# Patient Record
Sex: FEMALE | Race: WHITE | NOT HISPANIC OR LATINO | Employment: OTHER | URBAN - METROPOLITAN AREA
[De-identification: names, ages, dates, MRNs, and addresses within clinical notes are randomized per-mention and may not be internally consistent; named-entity substitution may affect disease eponyms.]

---

## 2018-07-11 ENCOUNTER — OFFICE VISIT (OUTPATIENT)
Dept: URGENT CARE | Facility: CLINIC | Age: 58
End: 2018-07-11
Payer: COMMERCIAL

## 2018-07-11 ENCOUNTER — APPOINTMENT (OUTPATIENT)
Dept: RADIOLOGY | Facility: CLINIC | Age: 58
End: 2018-07-11
Payer: COMMERCIAL

## 2018-07-11 VITALS
HEART RATE: 98 BPM | SYSTOLIC BLOOD PRESSURE: 118 MMHG | TEMPERATURE: 99.6 F | OXYGEN SATURATION: 94 % | RESPIRATION RATE: 18 BRPM | DIASTOLIC BLOOD PRESSURE: 60 MMHG

## 2018-07-11 DIAGNOSIS — M25.561 ACUTE PAIN OF RIGHT KNEE: Primary | ICD-10-CM

## 2018-07-11 DIAGNOSIS — M25.561 ACUTE PAIN OF RIGHT KNEE: ICD-10-CM

## 2018-07-11 PROCEDURE — G0382 LEV 3 HOSP TYPE B ED VISIT: HCPCS | Performed by: PHYSICIAN ASSISTANT

## 2018-07-11 PROCEDURE — 73562 X-RAY EXAM OF KNEE 3: CPT

## 2018-07-11 RX ORDER — DICLOFENAC SODIUM 75 MG/1
75 TABLET, DELAYED RELEASE ORAL 2 TIMES DAILY
Qty: 14 TABLET | Refills: 0 | Status: SHIPPED | OUTPATIENT
Start: 2018-07-11 | End: 2018-09-10

## 2018-07-11 RX ORDER — TRAZODONE HYDROCHLORIDE 50 MG/1
200 TABLET ORAL
COMMUNITY
End: 2019-06-11 | Stop reason: SDUPTHER

## 2018-07-11 RX ORDER — CLONAZEPAM 0.5 MG/1
0.5 TABLET ORAL 2 TIMES DAILY
COMMUNITY
End: 2019-05-07 | Stop reason: SDUPTHER

## 2018-07-11 RX ORDER — LITHIUM CARBONATE 150 MG/1
600 CAPSULE ORAL
COMMUNITY
End: 2019-05-07 | Stop reason: SDUPTHER

## 2018-07-11 RX ORDER — VENLAFAXINE 50 MG/1
150 TABLET ORAL EVERY MORNING
COMMUNITY
End: 2019-05-07 | Stop reason: ALTCHOICE

## 2018-07-11 NOTE — PATIENT INSTRUCTIONS

## 2018-07-11 NOTE — PROGRESS NOTES
[facility]        NAME: Kaushik Shea is a 60 y o  female  : 1960    MRN: [id number]  DATE: 2018  TIME: 3:33 PM    Assessment and Plan   Acute pain of right knee [M25 561]  1  Acute pain of right knee  XR knee 3 vw right non injury    diclofenac (VOLTAREN) 75 mg EC tablet         Patient Instructions       Follow up with orthopedics if no improvement in 3-5 days  Proceed to  ER if symptoms worsen  Chief Complaint     Chief Complaint   Patient presents with    Knee Pain     Pt c/o right knee pain for four weeks  History of Present Illness       Patient has right knee pain for the past 4 weeks  He was getting better until last night when her dog pulled her and re-injured the right knee  There is no swelling or limitation of motion  Most of the pain is in the medial aspect of her knee  Patient denies erythema or bruising  Review of Systems   Review of Systems   Constitutional: Negative for chills and fever  HENT: Negative for rhinorrhea and sore throat  Eyes: Negative for redness  Respiratory: Negative for cough  Cardiovascular: Negative for chest pain  Gastrointestinal: Negative for abdominal pain  Musculoskeletal: Negative for myalgias  Neurological: Negative for dizziness and headaches  Hematological: Negative for adenopathy           Current Medications       Current Outpatient Prescriptions:     clonazePAM (KlonoPIN) 0 5 mg tablet, Take 0 5 mg by mouth 2 (two) times a day, Disp: , Rfl:     lithium carbonate 150 mg capsule, Take 150 mg by mouth 3 (three) times a day with meals, Disp: , Rfl:     traZODone (DESYREL) 50 mg tablet, Take 50 mg by mouth daily at bedtime, Disp: , Rfl:     venlafaxine (EFFEXOR) 50 mg tablet, Take 50 mg by mouth 2 (two) times a day, Disp: , Rfl:     diclofenac (VOLTAREN) 75 mg EC tablet, Take 1 tablet (75 mg total) by mouth 2 (two) times a day for 7 days, Disp: 14 tablet, Rfl: 0    Current Allergies     Allergies as of 07/11/2018 - Reviewed 07/11/2018   Allergen Reaction Noted    Penicillins  07/11/2018            The following portions of the patient's history were reviewed and updated as appropriate: allergies, current medications, past family history, past medical history, past social history, past surgical history and problem list      History reviewed  No pertinent past medical history  History reviewed  No pertinent surgical history  No family history on file  Medications have been verified  Objective   /60   Pulse 98   Temp 99 6 °F (37 6 °C)   Resp 18   SpO2 94%        Physical Exam     Physical Exam   Constitutional: She is oriented to person, place, and time  She appears well-developed and well-nourished  HENT:   Head: Normocephalic and atraumatic  Eyes: Conjunctivae are normal    Neck: Neck supple  Cardiovascular: Normal rate and regular rhythm  Pulmonary/Chest: Effort normal    Neurological: She is alert and oriented to person, place, and time  Skin: Skin is warm and dry  No rash noted  Psychiatric: She has a normal mood and affect  Her behavior is normal  Judgment and thought content normal    Nursing note and vitals reviewed  Right knee x-ray viewed by me and independently reviewed by me contemporaneously as no fracture, degenerative changes

## 2018-07-18 ENCOUNTER — TRANSCRIBE ORDERS (OUTPATIENT)
Dept: ADMINISTRATIVE | Facility: HOSPITAL | Age: 58
End: 2018-07-18

## 2018-07-18 DIAGNOSIS — M25.561 ACUTE PAIN OF RIGHT KNEE: Primary | ICD-10-CM

## 2018-07-24 ENCOUNTER — HOSPITAL ENCOUNTER (OUTPATIENT)
Dept: MRI IMAGING | Facility: HOSPITAL | Age: 58
Discharge: HOME | End: 2018-07-24
Attending: FAMILY MEDICINE
Payer: COMMERCIAL

## 2018-07-24 DIAGNOSIS — M25.561 ACUTE PAIN OF RIGHT KNEE: ICD-10-CM

## 2018-07-24 PROCEDURE — 73721 MRI JNT OF LWR EXTRE W/O DYE: CPT

## 2018-07-27 ENCOUNTER — OFFICE VISIT (OUTPATIENT)
Dept: OBGYN CLINIC | Facility: CLINIC | Age: 58
End: 2018-07-27
Payer: COMMERCIAL

## 2018-07-27 VITALS
HEART RATE: 67 BPM | SYSTOLIC BLOOD PRESSURE: 134 MMHG | DIASTOLIC BLOOD PRESSURE: 85 MMHG | WEIGHT: 218 LBS | HEIGHT: 64 IN | BODY MASS INDEX: 37.22 KG/M2

## 2018-07-27 DIAGNOSIS — S83.241A ACUTE MEDIAL MENISCUS TEAR OF RIGHT KNEE, INITIAL ENCOUNTER: Primary | ICD-10-CM

## 2018-07-27 PROCEDURE — 99203 OFFICE O/P NEW LOW 30 MIN: CPT | Performed by: ORTHOPAEDIC SURGERY

## 2018-07-27 PROCEDURE — 20610 DRAIN/INJ JOINT/BURSA W/O US: CPT | Performed by: ORTHOPAEDIC SURGERY

## 2018-07-27 RX ORDER — BUPIVACAINE HYDROCHLORIDE 2.5 MG/ML
4 INJECTION, SOLUTION INFILTRATION; PERINEURAL
Status: COMPLETED | OUTPATIENT
Start: 2018-07-27 | End: 2018-07-27

## 2018-07-27 RX ORDER — BETAMETHASONE SODIUM PHOSPHATE AND BETAMETHASONE ACETATE 3; 3 MG/ML; MG/ML
12 INJECTION, SUSPENSION INTRA-ARTICULAR; INTRALESIONAL; INTRAMUSCULAR; SOFT TISSUE
Status: COMPLETED | OUTPATIENT
Start: 2018-07-27 | End: 2018-07-27

## 2018-07-27 RX ADMIN — BUPIVACAINE HYDROCHLORIDE 4 ML: 2.5 INJECTION, SOLUTION INFILTRATION; PERINEURAL at 15:22

## 2018-07-27 RX ADMIN — BETAMETHASONE SODIUM PHOSPHATE AND BETAMETHASONE ACETATE 12 MG: 3; 3 INJECTION, SUSPENSION INTRA-ARTICULAR; INTRALESIONAL; INTRAMUSCULAR; SOFT TISSUE at 15:22

## 2018-07-27 NOTE — PROGRESS NOTES
Chief Complaint  Right knee pain    History Of Presenting Illness  WINSOME Noyola 1960 presents with right knee pain 2 months duration      Current Medications  Current Outpatient Prescriptions   Medication Sig Dispense Refill    clonazePAM (KlonoPIN) 0 5 mg tablet Take 0 5 mg by mouth 2 (two) times a day      diclofenac (VOLTAREN) 75 mg EC tablet Take 1 tablet (75 mg total) by mouth 2 (two) times a day for 7 days 14 tablet 0    lithium carbonate 150 mg capsule Take 150 mg by mouth 3 (three) times a day with meals      traZODone (DESYREL) 50 mg tablet Take 50 mg by mouth daily at bedtime      venlafaxine (EFFEXOR) 50 mg tablet Take 50 mg by mouth 2 (two) times a day       No current facility-administered medications for this visit  Current Problems    Active Problems: There are no active problems to display for this patient          Review of Systems:    General: negative for - chills, fatigue, fever,  weight gain or weight loss  Psychological: negative for - anxiety, behavioral disorder, concentration difficulties  Ophthalmic: negative for - blurry vision, decreased vision, double vision,  ENT: negative for - hearing difficulties   Allergy and Immunology: negative for - hives, insect bite sensitivity,  Hematological and Lymphatic: negative for - bleeding problems, blood clots,bruising, swollen lymph nodes  Endocrine: negative for - hair pattern changes, hot flashes, malaise/lethargy  Respiratory: negative for - cough, hemoptysis, orthopnea, shortness of breath  Cardiovascular: negative for - chest pain, dyspnea on exertion, edema  Gastrointestinal: negative for - abdominal pain,  blood in stools, change in bowel habits,  nausea/vomiting  Genito-Urinary: negative for - dysuria, incontinence, irregular/heavy menses or urinary frequency/urgency  Musculoskeletal: see HPI  Dermatological: negative fo  rash and skin lesion changes  Neurological: negative for confusion, impaired coordination/balance, memory loss, numbness/tingling, seizures    Past Medical History:   No past medical history on file  Past Surgical History:   No past surgical history on file  Family History:  Family history reviewed and non-contributory  No family history on file  Social History:  Social History     Social History    Marital status: /Civil Union     Spouse name: N/A    Number of children: N/A    Years of education: N/A     Social History Main Topics    Smoking status: Not on file    Smokeless tobacco: Not on file    Alcohol use Not on file    Drug use: Unknown    Sexual activity: Not on file     Other Topics Concern    Not on file     Social History Narrative    No narrative on file       Allergies: Allergies   Allergen Reactions    Penicillins            Physical ExaminationThere were no vitals taken for this visit  Gen: Alert and oriented to person, place, time  HEENT: EOMI, eyes clear, moist mucus membranes, hearing intact  Respiratory: Bilateral chest rise  No audible wheezing found  Cardiovascular: Regular Rate and Rhythm  Abdomen: soft nontender/nondistended    Orthopedic Exam  ***          Impression  ***        Procedure: Mri Knee Right  Wo Contrast    Result Date: 7/26/2018  Narrative: INDICATION:  Medial pain for two months  ORDERING PROVIDER:  Rex Magdaleno  TECHNIQUE:  Multi planar fat and water sensitive sequences of the right knee were obtained without intravenous contrast  COMPARISON:  None available  FINDINGS: Medial compartment: Medial meniscus: Radial tear in the body of the meniscus  Medial articular surface: No focal medial compartment articular abnormalities identified  Lateral compartment: Lateral  meniscus: Signal and morphology of the lateral meniscus is within normal limits without evidence of meniscal tear  Lateral articular surface: No focal lateral compartment articular abnormalities identified  Patellofemoral joint space:  The articular surfaces reveal no significant focal area of chondrosis or chondromalacia  Ligaments and tendons: The ACL anteromedial and posterolateral bundles are unremarkable, without evidence of ACL injury  The PCL is within normal limits without evidence of tear or significant degeneration  The collateral ligamentous structures, including the MCL and fibular collateral ligaments are within normal limits without tear  Laterally the iliotibial band and popliteus tendons are intact  Postero-medially, semimembranosus, sartorius, gracilis and semitendinosus tendons are within normal limits  Quadriceps tendon is within normal limits  Gastrocnemius muscle insertions appear normal  Patellar tendon reveals no tear or significant tendinopathy  Soft tissues and bone marrow: There is no knee effusion  Marrow signal is within normal limits  No stress reaction or focal osseous lesions are identified  Impression: Radial tear in the free edge of the body of the medial meniscus  Cruciate and collateral ligaments are intact Signed by AYESHA Nino  Plan    ***    Angélica Ramachandran MD        Portions of the record may have been created with voice recognition software   Occasional wrong word or "sound a like" substitutions may have occurred due to the inherent limitations of voice recognition software   Read the chart carefully and recognize, using context, where substitutions have occurred

## 2018-07-27 NOTE — PATIENT INSTRUCTIONS
Diagnostic Knee Arthroscopy   WHAT YOU NEED TO KNOW:   Diagnostic knee arthroscopy is a procedure to look inside your knee joint  An arthroscope is a flexible tube with a light and camera on the end  Diagnostic arthroscopy is usually done to check for disease or damage inside your knee  DISCHARGE INSTRUCTIONS:   Medicines:   · Pain medicine: You may be given medicine to take away or decrease pain  Do not wait until the pain is severe before you take your medicine  · Take your medicine as directed  Contact your healthcare provider if you think your medicine is not helping or if you have side effects  Tell him or her if you are allergic to any medicine  Keep a list of the medicines, vitamins, and herbs you take  Include the amounts, and when and why you take them  Bring the list or the pill bottles to follow-up visits  Carry your medicine list with you in case of an emergency  Follow up with your healthcare provider or orthopedist as directed: You will need to return to have your wound checked and stitches removed  Write down your questions so you remember to ask them during your visits  Wound care:  Keep your bandage clean and dry  When you are allowed to bathe, carefully wash the wound with soap and water  Dry the area and put on new, clean bandages as directed  Change your bandages when they get wet or dirty  Self-care:   · Elevate:  Raise your knee above the level of your heart as often as you can  This will help decrease swelling and pain  Prop your knee on pillows or blankets to keep it elevated comfortably  · Ice:  Ice helps decrease swelling and pain  Ice may also help prevent tissue damage  Use an ice pack or put crushed ice in a plastic bag  Cover it with a towel, and place it on your knee for 15 to 20 minutes every hour as directed  · Wear pressure stockings: These are long, tight stockings that put pressure on your legs to promote blood flow and prevent clots  · Wear your brace:   You may need to wear a brace on your knee  This will help prevent movement so your knee can heal  You may need to use crutches to help you move around  · Exercise:  Ask your healthcare provider about the best exercise plan for you  Start slowly and return to your usual activities as directed  Physical therapy:  A physical therapist teaches you exercises to help improve movement and strength, and to decrease pain  Contact your healthcare provider or orthopedist if:   · You have a fever  · You have more pain in your knee, even after you take pain medicines  · Your wound is red, swollen, or draining pus  · You have questions or concerns about your condition or care  Seek care immediately or call 911 if:   · Blood soaks through your bandage  · Your stitches come apart  · You fall or injure your knee  · Your toes are numb, tingly, cool, or blue  · Your arm or leg feels warm, tender, and painful  It may look swollen and red  · You feel lightheaded and short of breath  · You have chest pain when you take a deep breath or cough  · You cough up blood  © 2017 [facility] Information is for End User's use only and may not be sold, redistributed or otherwise used for commercial purposes  All illustrations and images included in CareNotes® are the copyrighted property of [employer] D [employer]  or [employer]  The above information is an  only  It is not intended as medical advice for individual conditions or treatments  Talk to your doctor, nurse or pharmacist before following any medical regimen to see if it is safe and effective for you  Knee Exercises   WHAT YOU NEED TO KNOW:   What do I need to know about knee exercises? Knee exercises help strengthen the muscles around your knee  Strong muscles can help reduce pain and decrease your risk of future injury  Knee exercises also help you heal after an injury or surgery  · Start slow    These are beginning exercises  Ask your healthcare provider if you need to see a physical therapist for more advanced exercises  As you get stronger, you may be able to do more sets of each exercise or add weights  · Stop if you feel pain  It is normal to feel some discomfort at first  Regular exercise will help decrease your discomfort over time  · Do the exercises on both legs  Do this so both knees remain strong  · Warm up before you do knee exercises  Walk or ride a stationary bike for 5 or 10 minutes to warm your muscles  How do I perform knee stretches safely? Always stretch before you do strengthening exercises  Do these stretching exercises again after you do the strengthening exercises  Do these stretches 4 or 5 days a week, or as directed  · Standing calf stretch: Face a wall and place both palms flat on the wall, or hold the back of a chair for balance  Keep a slight bend in your knees  Take a big step backward with one leg  Keep your other leg directly under you  Keep both heels flat and press your hips forward  Hold the stretch for 30 seconds, and then relax for 30 seconds  Switch legs  Repeat 2 or 3 times on each leg  · Standing quadriceps stretch:  Stand and place one hand against a wall or hold the back of a chair for balance  With your weight on one leg, bend your other leg and grab your ankle  Bring your heel toward your buttocks  Hold the stretch for 30 to 60 seconds  Switch legs  Repeat 2 or 3 times on each leg  · Sitting hamstring stretch:  Sit with both legs straight in front of you  Do not point or flex your toes  Place your palms on the floor and slide your hands forward until you feel the stretch  Do not round your back  Hold the stretch for 30 seconds  Repeat 2 or 3 times  How do I perform knee strengthening exercises safely? Do these exercises 4 or 5 days a week, or as directed  · Standing half squats:  Stand with your feet shoulder-width apart   Lean your back against a wall or hold the back of a chair for balance, if needed  Slowly sit down about 10 inches, as if you are going to sit in a chair  Your body weight should be mostly over your heels  Hold the squat for 5 seconds, then rise to a standing position  Do 3 sets of 10 squats to strengthen your buttocks and thighs  · Standing hamstring curls: Face a wall and place both palms flat on the wall, or hold the back of a chair for balance  With your weight on one leg, lift your other foot as close to your buttocks as you can  Hold for 5 seconds and then lower your leg  Do 2 sets of 10 curls on each leg  This exercise strengthens the muscles in the back of your thigh  · Standing calf raises:  Face a wall and place both palms flat on the wall, or hold the back of a chair for balance  Stand up straight, and do not lean  Place all your weight on one leg by lifting the other foot off the floor  Raise the heel of the foot that is on the floor as high as you can and then lower it  Do 2 sets of 10 calf raises on each leg to strengthen your calf muscles  · Straight leg lifts:  Lie on your stomach with straight legs  Fold your arms in front of you and rest your head in your arms  Tighten your leg muscles and raise one leg as high as you can  Hold for 5 seconds, then lower your leg  Do 2 sets of 10 lifts on each leg to strengthen your buttocks  · Sitting leg lifts:  Sit in a chair  Slowly straighten and raise one leg  Squeeze your thigh muscles and hold for 5 seconds  Relax and return your foot to the floor  Do 2 sets of 10 lifts on each leg  This helps strengthen the muscles in the front of your thigh  When should I contact my healthcare provider? · You have new pain or your pain becomes worse  · You have questions or concerns about your condition or care  CARE AGREEMENT:   You have the right to help plan your care  Learn about your health condition and how it may be treated  Discuss treatment options with your caregivers to decide what care you want to receive  You always have the right to refuse treatment  The above information is an  only  It is not intended as medical advice for individual conditions or treatments  Talk to your doctor, nurse or pharmacist before following any medical regimen to see if it is safe and effective for you  © 2017 [address] [facility] Information is for End User's use only and may not be sold, redistributed or otherwise used for commercial purposes  All illustrations and images included in CareNotes® are the copyrighted property of A D [employer] M [employer] Inc  or [employer]

## 2018-07-27 NOTE — PROGRESS NOTES
Chief complaint  Right knee pain 3 months    History Of Presenting Illness  WINSOME Noyola 1960 presents with right knee pain 3 months  Patient got up from a sitting position and x-ray and MRI  Patient is well otherwise except for history of bipolar disorder acute pain in the right knee  Pain mainly located in the medial side  Initially associated sense of instability  Now patient has constant discomfort in the medial aspect of the right knee  Aggravated by bending stooping and walking  Decreased with rest   At its worse patient rates the pain as a 5/10  Patient has had no treatment for this  Patient presents for evaluation with an MRI and x-ray  Patient is well otherwise  Patient has history of bipolar disorder      Current Medications  Current Outpatient Prescriptions   Medication Sig Dispense Refill    clonazePAM (KlonoPIN) 0 5 mg tablet Take 0 5 mg by mouth 2 (two) times a day      lithium carbonate 150 mg capsule Take 150 mg by mouth 3 (three) times a day with meals      traZODone (DESYREL) 50 mg tablet Take 50 mg by mouth daily at bedtime      venlafaxine (EFFEXOR) 50 mg tablet Take 50 mg by mouth 2 (two) times a day      diclofenac (VOLTAREN) 75 mg EC tablet Take 1 tablet (75 mg total) by mouth 2 (two) times a day for 7 days 14 tablet 0     No current facility-administered medications for this visit  Current Problems    Active Problems: There are no active problems to display for this patient  Review of Systems:    General: negative for - chills, fatigue, fever,  weight gain or weight loss  Psychological: negative for - anxiety, behavioral disorder, concentration difficulties  Ophthalmic: negative for - blurry vision, decreased vision, double vision,      Past Medical History:   No past medical history on file  Past Surgical History:   No past surgical history on file  Family History:  Family history reviewed and non-contributory  No family history on file      Social History:  Social History     Social History    Marital status: /Civil Union     Spouse name: N/A    Number of children: N/A    Years of education: N/A     Social History Main Topics    Smoking status: Never Smoker    Smokeless tobacco: Never Used    Alcohol use Not on file    Drug use: Unknown    Sexual activity: Not on file     Other Topics Concern    Not on file     Social History Narrative    No narrative on file       Allergies:    Allergies   Allergen Reactions    Penicillins            Physical ExaminationBP 134/85   Pulse 67   Ht 5' 4" (1 626 m)   Wt 98 9 kg (218 lb)   BMI 37 42 kg/m²   Gen: Alert and oriented to person, place, time  HEENT: EOMI, eyes clear, moist mucus membranes, hearing intact      Orthopedic Exam  Right knee minimal effusion present  No obvious swelling or deformity  Flexion 3-120 degrees  Medial joint line tenderness especially in the posterior medial aspect  MRI shows radial tear medial meniscus          Impression  Right knee pain due to medial meniscal tear          Plan    Discussed treatment with the patient  Right knee injected with steroid and local anesthetic  Patient will start a physical therapy program and home exercise program  Follow-up in 6 weeks  If the knee still symptomatic she will benefit from an arthroscopic debridement  Large joint arthrocentesis  Date/Time: 7/27/2018 3:22 PM  Consent given by: patient  Site marked: site marked  Timeout: Immediately prior to procedure a time out was called to verify the correct patient, procedure, equipment, support staff and site/side marked as required   Supporting Documentation  Indications: pain and joint swelling   Procedure Details  Location: knee - R knee  Preparation: Patient was prepped and draped in the usual sterile fashion  Needle size: 22 G  Ultrasound guidance: no  Approach: anterolateral  Medications administered: 4 mL bupivacaine 0 25 %; 12 mg betamethasone acetate-betamethasone sodium phosphate 6 (3-3) mg/mL    Patient tolerance: patient tolerated the procedure well with no immediate complications  Dressing:  Sterile dressing applied        Gera Levine MD        Portions of the record may have been created with voice recognition software   Occasional wrong word or "sound a like" substitutions may have occurred due to the inherent limitations of voice recognition software   Read the chart carefully and recognize, using context, where substitutions have occurred

## 2018-08-06 ENCOUNTER — EVALUATION (OUTPATIENT)
Dept: PHYSICAL THERAPY | Facility: CLINIC | Age: 58
End: 2018-08-06
Payer: COMMERCIAL

## 2018-08-06 DIAGNOSIS — S83.241D ACUTE MEDIAL MENISCUS TEAR OF RIGHT KNEE, SUBSEQUENT ENCOUNTER: Primary | ICD-10-CM

## 2018-08-06 PROCEDURE — 97535 SELF CARE MNGMENT TRAINING: CPT | Performed by: PHYSICAL MEDICINE & REHABILITATION

## 2018-08-06 PROCEDURE — 97110 THERAPEUTIC EXERCISES: CPT | Performed by: PHYSICAL MEDICINE & REHABILITATION

## 2018-08-06 PROCEDURE — G8979 MOBILITY GOAL STATUS: HCPCS | Performed by: PHYSICAL MEDICINE & REHABILITATION

## 2018-08-06 PROCEDURE — 97161 PT EVAL LOW COMPLEX 20 MIN: CPT | Performed by: PHYSICAL MEDICINE & REHABILITATION

## 2018-08-06 PROCEDURE — G8978 MOBILITY CURRENT STATUS: HCPCS | Performed by: PHYSICAL MEDICINE & REHABILITATION

## 2018-08-06 NOTE — PROGRESS NOTES
PT Evaluation     Today's date: 2018  Patient name: Jacqueline Pearson  : 1960  MRN: [id number]  Referring provider: Douglas Joshua MD  Dx:   Encounter Diagnosis     ICD-10-CM    1  Acute medial meniscus tear of right knee, subsequent encounter S83 961E                   Assessment  Impairments: abnormal gait, abnormal muscle firing, abnormal muscle tone, abnormal or restricted ROM, abnormal movement, impaired physical strength, lacks appropriate home exercise program, pain with function and weight-bearing intolerance    Assessment details: Maggi Houser is a 60 y o  female presenting to outpatient physical therapy with diagnosis of Acute medial meniscus tear of right knee, subsequent encounter  Pt notes recent resolution of R knee pain s/p injection R knee  Patient presents with c/co of cont intermittent R medial knee pain, reduced range of motion into TKE with pain, reduced strength R hip/knee with pain into TKE and reduced R quad tone, reduced postural/positional awareness, and reduced functional activity tolerance with pain  Patient to benefit from skilled outpatient physical therapy to reduce pain, maximize pain free range of motion, increase strength and stability, and improve functional mobility/functional activity in order to maximize return to prior level of function with reduced limitations  Thank you for your referral     Understanding of Dx/Px/POC: good   Prognosis: good    Goals  STGs to be achieved in 4 weeks:  1  Pt to demonstrate reduced subjective pain rating "at worst" by at least 2-3 points from Initial Eval in order to allow for reduced pain with ADLs and improved functional activity tolerance  2  Pt to demonstrate increased AROM of R knee into extension to 0deg without pain in order to allow for greater ease and independence with ADLs and functional mobility     3  Pt to demonstrate full PROM of R knee without pain in order to maximize joint mobility and function and allow for progression of exercise program and achievement of goals  4  Pt to demonstrate increased MMT of R knee extension by at least 1/2-1 grade in order to improve safety and stability with ADLs and functional mobility  LTGs to be achieved in 6-8 weeks:  1  Pt will be I with HEP in order to continue to improve quality of life and independence and reduce risk for re-injury  2  Pt to demonstrate return to walking her dog and PLOF without limitations or restrictions  3  Pt to demonstrate improved function as noted by achieving or exceeding predicted score on FOTO outcomes assessment tool  Plan  Patient would benefit from: skilled physical therapy  Referral necessary: No  Planned modality interventions: cryotherapy and thermotherapy: hydrocollator packs  Planned therapy interventions: manual therapy, neuromuscular re-education, patient education, self care, strengthening, stretching, therapeutic exercise, balance, flexibility, functional ROM exercises and home exercise program  Frequency: 2x week  Duration in visits: 8  Duration in weeks: 4  Treatment plan discussed with: patient        Subjective Evaluation    History of Present Illness  Mechanism of injury: Pt notes she developed pain /"a twinge" in R knee when getting out of a chair in May 2018  No known trauma or CORINNE leading up to this event per pt  Pt notes pain persisted off/on since  Notes by June, she had more constant pain in R knee  Denied any locking/instability of the knee  Notes she f/u with Urgent care in July  Xrays were done which were negative  F/u with family MD July 12th; MRI done which was + for R knee MMT  Referred to Dr Eitan Yusuf; cortisone injection given R knee and referred to OPPT  RTD prn  Possible surgery if no improvement with conservative management per pt  Pt notes minimal-no pain in R knee  Pain is located in R anterior/medial knee at times   Pain is triggered by laying on her side with knees pulled up with L knee over R; also can be painful with turning/pivoting when walking  Notes no instability or locking R knee at this time; no joint noise; no n/t or sensory changes  Notes since injection, her balance and overall mobility have improved  Not a recurrent problem   Quality of life: excellent    Pain  Current pain ratin  At best pain ratin  At worst pain ratin  Location: R anterior medial knee proximal to patella   Quality: dull ache and sharp  Progression: improved (since injection)    Social Support  Steps to enter house: yes  Stairs in house: yes   Lives in: multiple-level home  Lives with: spouse    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, full time)  Hand dominance: right  Exercise history: Enjoyed walking her dog      Diagnostic Tests  X-ray: normal  MRI studies: abnormal (MMT R)  Treatments  Current treatment: injection treatment and physical therapy  Patient Goals  Patient goals for therapy: decreased pain, increased motion, increased strength, independence with ADLs/IADLs and return to sport/leisure activities          Objective     Observations     Additional Observation Details  No notable swelling at IE  No abrasions or ecchymosis  Will monitor       Palpation     Additional Palpation Details  No mm ttp noted at this time  Will monitor       Tenderness     Right Knee   Tenderness in the medial joint line and medial patella       Neurological Testing     Sensation     Knee   Left Knee   Intact: light touch    Right Knee   Intact: light touch     Additional Neurological Details  No n/t or sensory changes at this time       Active Range of Motion   Left Knee   Normal active range of motion    Right Knee   Flexion: 125 degrees   Extension: 0 degrees with pain    Additional Active Range of Motion Details  Pain in medial knee/patella with TKE supine/sitting  No sx with flexion       Passive Range of Motion   Left Knee   Normal passive range of motion    Right Knee   Flexion: 130 degrees   Extension: 0 degrees with pain    Additional Passive Range of Motion Details  Pain medial patella/ knee with end range knee extension with overpressure; no sx with flexion with overpressure      Mobility   Patellar Mobility:   Left Knee   WFL: medial, lateral, superior and inferior  Right Knee   WFL: medial, lateral, superior and inferior    Additional Mobility Details  No pain/sx    Strength/Myotome Testing     Left Knee   Flexion: 4+  Extension: 4+  Quadriceps contraction: good    Right Knee   Flexion: 4+  Extension: 4  Quadriceps contraction: fair    Additional Strength Details  Pain medial R knee/patella with resisted knee extension into TKE  Reduced R quad tone noted in supine with QS 2* knee pain as noted  Slight extensor lag noted with SLR flexion with pain as noted into TKE  Will monitor    MMT:  Hip flexion- 4+/5 L, 4-4+/5 R  Hip adduction- 4+/5 R and L  Hip abduction- 4+/5 L, 4-4+/5 R   Ankle DF/PF- grossly 4+-5/5 B   No pain/sx otherwise with MMT  Will monitor     Tests     Right Knee   Negative patellar apprehension, patellar compression and patella-femoral grind       Additional Tests Details  + MMT R knee per MRI    Will cont to assess prn upcoming     Ambulation     Ambulation: Level Surfaces     Additional Level Surfaces Ambulation Details  Gait grossly WNL/WFL   Intermittent reduced stance time R LE with slight lateral flexion with R stance when first getting up; resolved with taking a few steps   Will monitor       Flowsheet Rows      Most Recent Value   PT/OT G-Codes   Current Score  68   Projected Score  72   FOTO information reviewed  Yes   Assessment Type  Evaluation   G code set  Mobility: Walking & Moving Around   Mobility: Walking and Moving Around Current Status ()  CJ   Mobility: Walking and Moving Around Goal Status ()  CJ          Precautions: hx bipolar depression, + MMT R knee     Re-eval Date: 9/3/18    Date 8/6       Visit Count 1       FOTO 8/6       Pain In 0/10       Pain Out 0/10 Daily Treatment Diary     Manual  8/6                                               Upcoming-   R Knee PROM flex/ext     Exercise Diary  8/6       [address] [facility] vs nustep        HS stretch 4x30"       Calf stretch 4x30"       Quad stretch EOT 4x30"       QS with roll under knee-->under ankle 2x10/5"       Hip add iso 2x10/5"       Hip abd hooklying 2x10 grn        Bridges--> with abd/add        SAQ with add         SLR 4 way mat table vs standing         LAQ        HS curls        TR/HR        Partial squats        Step ups         Balance                                             Modalities  8/6 8/6 - HEP was issued and reviewed this date for above noted exercises  Pt demonstrated understanding without incident and without questions/concerns  Will continue to update upcoming

## 2018-08-07 ENCOUNTER — OFFICE VISIT (OUTPATIENT)
Dept: PHYSICAL THERAPY | Facility: CLINIC | Age: 58
End: 2018-08-07
Payer: COMMERCIAL

## 2018-08-07 DIAGNOSIS — S83.241D ACUTE MEDIAL MENISCUS TEAR OF RIGHT KNEE, SUBSEQUENT ENCOUNTER: Primary | ICD-10-CM

## 2018-08-07 PROCEDURE — 97110 THERAPEUTIC EXERCISES: CPT

## 2018-08-07 PROCEDURE — 97140 MANUAL THERAPY 1/> REGIONS: CPT

## 2018-08-07 NOTE — PROGRESS NOTES
Daily Note     Today's date: 2018  Patient name: Ovi Noble  : 1960  MRN: [id number]  Referring provider: Tello Giron MD  Dx:   Encounter Diagnosis     ICD-10-CM    1  Acute medial meniscus tear of right knee, subsequent encounter S83 241D        Start Time: 7547  Stop Time: 1645  Total time in clinic (min): 60 minutes  Precautions: hx bipolar depression, + MMT R knee     Re-eval Date: 9/3/18    Date       Visit Count 1 2      FOTO        Pain In 0/10 0      Pain Out 0/10 0        Subjective: "I have pain on and off "      Objective: See treatment diary below      Assessment: Tolerated treatment well  Initiated ex program this date  Pt appropriately challenged with ex  No increase in s/s with ex  No end knee pain noted  Patient demonstrated fatigue post treatment, exhibited good technique with therapeutic exercises and would benefit from continued PT      Plan: Continue per plan of care  Progress treatment as tolerated  Daily Treatment Diary     Manual          10'                                      Upcoming-   R Knee PROM flex/ext     Exercise Diary        [address] [facility] vs nustep  L3 10'      HS stretch 4x30" 4x30"      Calf stretch 4x30" 4x30"      Quad stretch EOT 4x30" 4x30"      QS with roll under knee-->under ankle 2x10/5" 20x/5"      Hip add iso 2x10/5" 2x10/5"      Hip abd hooklying 2x10 grn  2x10/5" grn      Bridges--> with abd/add  2x10      SAQ with add   2x10/5"      SLR 4 way mat table vs standing   Flex/abd  2x10 ea      LAQ  2x10/5"      HS curls        TR/HR        Partial squats        Step ups         Balance                                             Modalities   - HEP was issued and reviewed this date for above noted exercises  Pt demonstrated understanding without incident and without questions/concerns  Will continue to update upcoming

## 2018-08-13 ENCOUNTER — OFFICE VISIT (OUTPATIENT)
Dept: PHYSICAL THERAPY | Facility: CLINIC | Age: 58
End: 2018-08-13
Payer: COMMERCIAL

## 2018-08-13 DIAGNOSIS — S83.241D ACUTE MEDIAL MENISCUS TEAR OF RIGHT KNEE, SUBSEQUENT ENCOUNTER: Primary | ICD-10-CM

## 2018-08-13 PROCEDURE — 97110 THERAPEUTIC EXERCISES: CPT

## 2018-08-13 NOTE — PROGRESS NOTES
Daily Note     Today's date: 2018  Patient name: Alka Friend  : 1960  MRN: [id number]  Referring provider: Alvarado Bray MD  Dx:   Encounter Diagnosis     ICD-10-CM    1  Acute medial meniscus tear of right knee, subsequent encounter S83 241D        Start Time: 1100  Stop Time: 1200  Total time in clinic (min): 60 minutes  Precautions: hx bipolar depression, + MMT R knee     Re-eval Date: 9/3/18    Date      Visit Count 1 2 3     FOTO        Pain In 0/10 0 2     Pain Out 0/10 0 2       Subjective: "The knee is about the same "      Objective: See treatment diary below      Assessment: Tolerated treatment well  Pt able to increase repetitions on various ex without incident  No significant change in pain level at end of session  Patient demonstrated fatigue post treatment, exhibited good technique with therapeutic exercises and would benefit from continued PT      Plan: Continue per plan of care  Progress treatment as tolerated  Daily Treatment Diary     Manual          10'                                      Upcoming-   R Knee PROM flex/ext     Exercise Diary        [facility] vs nustep  L3 10' L3 10'     HS stretch 4x30" 4x30" 4x30"     Calf stretch 4x30" 4x30" 4x30"     Quad stretch EOT 4x30" 4x30" 4x30"     QS with roll under knee-->under ankle 2x10/5" 20x/5" 30x/5"     Hip add iso 2x10/5" 2x10/5" 3x10/5"     Hip abd hooklying 2x10 grn  2x10/5" grn 3x10/5" grn     Bridges--> with abd/add  2x10 3x10     SAQ with add   2x10/5" 3x10/5"     SLR 4 way mat table vs standing   Flex/abd  2x10 ea 3x10      LAQ  2x10/5" resume     HS curls   2x10     TR/HR   20x ea     Partial squats   2x10     Step ups         Balance                                             Modalities   - HEP was issued and reviewed this date for above noted exercises  Pt demonstrated understanding without incident and without questions/concerns   Will continue to update upcoming

## 2018-08-15 ENCOUNTER — OFFICE VISIT (OUTPATIENT)
Dept: PHYSICAL THERAPY | Facility: CLINIC | Age: 58
End: 2018-08-15
Payer: COMMERCIAL

## 2018-08-15 DIAGNOSIS — S83.241D ACUTE MEDIAL MENISCUS TEAR OF RIGHT KNEE, SUBSEQUENT ENCOUNTER: Primary | ICD-10-CM

## 2018-08-15 PROCEDURE — 97110 THERAPEUTIC EXERCISES: CPT

## 2018-08-15 NOTE — PROGRESS NOTES
Daily Note     Today's date: 8/15/2018  Patient name: Sarah Trujillo  : 1960  MRN: [id number]  Referring provider: Coty Powell MD  Dx: No diagnosis found  Precautions: hx bipolar depression, + MMT R knee     Re-eval Date: 9/3/18    Date 8/6 8/7 8/13 8/15    Visit Count 1 2 3 4    FOTO        Pain In 0/10 0 2 1    Pain Out 0/10 0 2 1      Subjective: "I just woke up so my knee doesn't really bother me "      Objective: See treatment diary below      Assessment: Tolerated treatment well  Pt able to increase reps without incident  Pt noted improvement in knee motion and decreased pain  Patient demonstrated fatigue post treatment, exhibited good technique with therapeutic exercises and would benefit from continued PT      Plan: Continue per plan of care  Progress treatment as tolerated  Daily Treatment Diary     Manual  8/6 8/7  8/15      10'                                      Upcoming-   R Knee PROM flex/ext     Exercise Diary        [address] [facility] vs nustep  L3 10' L3 10' L3 10'    HS stretch 4x30" 4x30" 4x30" 4x30"    Calf stretch 4x30" 4x30" 4x30" 4x30"    Quad stretch EOT 4x30" 4x30" 4x30" 4x30"    QS with roll under knee-->under ankle 2x10/5" 20x/5" 30x/5" 30x/5"    Hip add iso 2x10/5" 2x10/5" 3x10/5" 3x10/5"    Hip abd hooklying 2x10 grn  2x10/5" grn 3x10/5" grn 3x10/5" grn    Bridges--> with abd/add  2x10 3x10 3x10    SAQ with add   2x10/5" 3x10/5" 3x10/5"    SLR 4 way mat table vs standing   Flex/abd  2x10 ea 3x10  3x10    LAQ  2x10/5" resume     HS curls   2x10 3x10    TR/HR   20x ea 30x    Partial squats   2x10 2x10    Step ups         Balance                                             Modalities   - HEP was issued and reviewed this date for above noted exercises  Pt demonstrated understanding without incident and without questions/concerns  Will continue to update upcoming

## 2018-08-20 ENCOUNTER — OFFICE VISIT (OUTPATIENT)
Dept: PHYSICAL THERAPY | Facility: CLINIC | Age: 58
End: 2018-08-20
Payer: COMMERCIAL

## 2018-08-20 DIAGNOSIS — S83.241D ACUTE MEDIAL MENISCUS TEAR OF RIGHT KNEE, SUBSEQUENT ENCOUNTER: Primary | ICD-10-CM

## 2018-08-20 PROCEDURE — 97110 THERAPEUTIC EXERCISES: CPT | Performed by: PHYSICAL MEDICINE & REHABILITATION

## 2018-08-20 NOTE — PROGRESS NOTES
Daily Note     Today's date: 2018  Patient name: Benjamin Page  : 1960  MRN: [id number]  Referring provider: Parviz Vaughan MD  Dx:   Encounter Diagnosis     ICD-10-CM    1  Acute medial meniscus tear of right knee, subsequent encounter S83 241D                 Precautions: hx bipolar depression, + MMT R knee     Re-eval Date: 9/3/18    Date 8/6 8/7 8/13 8/15 8/20   Visit Count 1 2 3 4 5   FOTO        Pain In 010 0 2 1 1/10   Pain Out 0/10 0 2 1 1/10     Subjective: Pt notes no new sx/complaints  Reports intermittent pain continues R anterior/medial knee knee; most often with twisting her knee in WB or negotiating uneven terrain  Notes initially pain was resolved after the injection,  However appears to be intermittent at this time  Does not feel it is worsening  Objective: See treatment diary below      Assessment: Tolerated treatment well  Progressed program to tolerance with increased reps  Offered increased resistance with some exercises however pt deferred as she noted they were "still challenging"  Initially had pain with mini squat; resolved with cues for proper form/technique  No complaints end of tx  Continues with R hip and quad weakness with quick mm fatigue with exercises  Instructed pt in tandem stance, floor, EO alt feet to address balance/proprioception  Patient demonstrated fatigue post treatment and would benefit from continued PT      Plan: Continue per plan of care  Progress treatment as tolerated  Progress hip/quad strengthening  Progress balance/proprioception exercises to tolerance       Daily Treatment Diary     Manual  8/6 8/7  8/15 8/20     10'                                      Upcoming-   R Knee PROM flex/ext     Exercise Diary     [facility] vs nustep  L3 10' L3 10' L3 10' L3 10'   HS stretch 4x30" 4x30" 4x30" 4x30" 4x30"   Calf stretch 4x30" 4x30" 4x30" 4x30" 4x30"   Quad stretch EOT 4x30" 4x30" 4x30" 4x30" 4x30"   QS with roll under knee-->under ankle 2x10/5" 20x/5" 30x/5" 30x/5" HEP   Hip add iso 2x10/5" 2x10/5" 3x10/5" 3x10/5" 3x10/5"   Hip abd hooklying 2x10 grn  2x10/5" grn 3x10/5" grn 3x10/5" grn 3x10/5" grn   Bridges--> with abd/add  2x10 3x10 3x10 3x10/5"   SAQ with add   2x10/5" 3x10/5" 3x10/5" DC   SLR 4 way mat table vs standing   Flex/abd  2x10 ea 3x10  3x10 3x10 ea   LAQ  2x10/5" resume  3x10   HS curls   2x10 3x10 3x10   TR/HR   20x ea 30x 30x ea    Partial squats   2x10 2x10 2x10 cues    Step ups         Balance      Tandem floor EO reviewed R/L                                        Modalities  8/6            deferred                     8/6 - HEP was issued and reviewed this date for above noted exercises  Pt demonstrated understanding without incident and without questions/concerns  Will continue to update upcoming

## 2018-08-22 ENCOUNTER — OFFICE VISIT (OUTPATIENT)
Dept: PHYSICAL THERAPY | Facility: CLINIC | Age: 58
End: 2018-08-22
Payer: COMMERCIAL

## 2018-08-22 DIAGNOSIS — S83.241D ACUTE MEDIAL MENISCUS TEAR OF RIGHT KNEE, SUBSEQUENT ENCOUNTER: Primary | ICD-10-CM

## 2018-08-22 PROCEDURE — 97112 NEUROMUSCULAR REEDUCATION: CPT | Performed by: PHYSICAL MEDICINE & REHABILITATION

## 2018-08-22 PROCEDURE — 97110 THERAPEUTIC EXERCISES: CPT | Performed by: PHYSICAL MEDICINE & REHABILITATION

## 2018-08-22 NOTE — PROGRESS NOTES
Daily Note     Today's date: 2018  Patient name: Kaushik Shea  : 1960  MRN: [id number]  Referring provider: Erica Moore MD  Dx:   Encounter Diagnosis     ICD-10-CM    1  Acute medial meniscus tear of right knee, subsequent encounter S83 241D                 Precautions: hx bipolar depression, + MMT R knee     Re-eval Date: 9/3/18    Date        Visit Count 6       FOTO        Pain In 1/10       Pain Out 1/10           Subjective: Pt notes cont intermittent/ off/on soreness R knee  No new sx/complaints  No complaints after last tx  Objective: See treatment diary below      Assessment: Tolerated treatment well  Denied any increased pain/sx with exercises  Progressed program with addition of ankle weights  Quick mm fatigue noted with SLRs fleixon > abduction  Added foam to HR and tandem stance to challenge stability and proprioception  no complaints end of session  Patient demonstrated fatigue post treatment and would benefit from continued PT      Plan: Continue per plan of care  Progress treatment as tolerated  Daily Treatment Diary     Manual                                                 Upcoming-   R Knee PROM flex/ext     Exercise Diary         [address] [facility] vs nustep L1 10'        HS stretch 4x30"       Calf stretch 4x30"       Quad stretch EOT 4x30"       QS with roll under knee-->under ankle HEP       Hip add iso 30x/5"       Hip abd hooklying Blue  3x10        Bridges--> with abd/add 3x10/5"       SAQ with add  DC       SLR 4 way mat table vs standing  3x10 ea   1#       LAQ 1 # 3x10        HS curls 1# 3x10        TR/HR 3x10 foam       Partial squats HEP       Step ups         Balance  Tandem foam EO  3x30" ea alt 0HHA       Heel slides  10x10"                                   Modalities                                   8/ - HEP was issued and reviewed this date for above noted exercises  Pt demonstrated understanding without incident and without questions/concerns   Will continue to update upcoming

## 2018-08-31 ENCOUNTER — OFFICE VISIT (OUTPATIENT)
Dept: OBGYN CLINIC | Facility: MEDICAL CENTER | Age: 58
End: 2018-08-31
Payer: COMMERCIAL

## 2018-08-31 VITALS
HEIGHT: 64 IN | DIASTOLIC BLOOD PRESSURE: 66 MMHG | BODY MASS INDEX: 38.93 KG/M2 | HEART RATE: 61 BPM | SYSTOLIC BLOOD PRESSURE: 108 MMHG | WEIGHT: 228 LBS

## 2018-08-31 DIAGNOSIS — Z01.818 PREOPERATIVE TESTING: ICD-10-CM

## 2018-08-31 DIAGNOSIS — M17.11 PRIMARY OSTEOARTHRITIS OF RIGHT KNEE: Primary | ICD-10-CM

## 2018-08-31 DIAGNOSIS — S83.241A TEAR OF MEDIAL MENISCUS OF RIGHT KNEE, UNSPECIFIED TEAR TYPE, UNSPECIFIED WHETHER OLD OR CURRENT TEAR, INITIAL ENCOUNTER: ICD-10-CM

## 2018-08-31 PROCEDURE — 99213 OFFICE O/P EST LOW 20 MIN: CPT | Performed by: ORTHOPAEDIC SURGERY

## 2018-08-31 RX ORDER — CLINDAMYCIN PHOSPHATE 900 MG/50ML
900 INJECTION INTRAVENOUS ONCE
Status: CANCELLED | OUTPATIENT
Start: 2018-08-31 | End: 2018-08-31

## 2018-08-31 NOTE — PROGRESS NOTES
Assessment/Plan     1  Primary osteoarthritis of right knee    2  Tear of medial meniscus of right knee, unspecified tear type, unspecified whether old or current tear, initial encounter    3  Preoperative testing      Orders Placed This Encounter   Procedures    Basic metabolic panel    CBC and differential    [facility] referral to [facility]    EKG 12 lead     Ms Eric Mehta has a meniscal tear and mild arthritis  We discussed her treatment options today as well as the risks and benefits of her treatment options  She has tried physical therapy as well as a steroid injection in the past   She has tried a brace as well as NSAIDs  She continues to have pain  She would like to move forward with a surgical arthroscopy of the right knee  The risks of surgery include, but are not limited to infection, blood clot, wound healing problems, blood loss, damage to blood vessels and nerves, persistent pain and stiffness, need for additional surgery, heart attack, stroke, death  The patient understood and agreed to by oral and written consent  I answered all questions regarding surgery  She has a direct phone number to the office for any further questions  She will follow up 1 week from date of surgery  She will get clearance from her PCP  She will follow up sooner if needed  Return for 1 week after surgery  I answered all of the patient's questions during the visit and provided education of the patient's condition during the visit  The patient verbalized understanding of the information given and agrees with the plan  This note was dictated using [employer] software  It may contain errors including improperly dictated words  Please contact physician directly for any questions  History of Present Illness   Chief complaint:   Chief Complaint   Patient presents with    Right Knee - Pain       HPI: Damian Doran is a 60 y o  female that c/o right knee pain that started in May    She had no falls, traumas, or injuries  She  went to stand up and take a step and felt increased knee pain  Her knee pain is medial   The pain does not radiate  The pain is there most of the time  It feels better when she 1st wakes up but gets worse throughout the day  It is worse with weightbearing  It is a sharp, stabbing pain  She has been taking Advil which does help occasionally  She feels her knee is stable  She denies any locking, clicking, or popping  She has been doing physical therapy for the past 3 weeks  She states it has not been helpful  She does have a knee brace that she bought over-the-counter which does help somewhat  She did have a steroid injection on July 27th which helped for about 2-3 weeks  She has not had any other injections  She has not had any surgeries on this knee  ROS:    See HPI for musculoskeletal review  All other systems reviewed are negative     Historical Information   Past Medical History:   Diagnosis Date    Bipolar 1 disorder ([facility] [address] )      Past Surgical History:   Procedure Laterality Date    NASAL SEPTUM SURGERY      TONSILECTOMY AND ADNOIDECTOMY       Social History   History   Alcohol use Not on file     History   Drug use: Unknown     History   Smoking Status    Never Smoker   Smokeless Tobacco    Never Used     Family History:   Family History   Problem Relation Age of Onset    No Known Problems Mother     No Known Problems Father        Meds/Allergies     (Not in a hospital admission)  Allergies   Allergen Reactions    Cephalosporins     Penicillins        Objective   Vitals: Blood pressure 108/66, pulse 61, height 5' 4" (1 626 m), weight 103 kg (228 lb)  ,Body mass index is 39 14 kg/m²      PE:  AAOx 3  WDWN  Hearing intact, no drainage from eyes  Regular rate  no audible wheezing  no abdominal distension  LE compartments soft, skin intact  EHL/AT/GS intact, sensation grossly intact L4, L5, S1, palpable pedal pulse    rightknee:    Appearance:  no swelling   No bruising  no obvious joint deformity   No effusion  Palpation/Tenderness:  +TTP over medial joint line, no TTP over lateral joint line or over patella/patellar tendon  Active Range of Motion:  AROM: full  Special Tests:  Medial Aristeo's Test:  Positive  Lateral Aristeo's Test:  Positive  Apley's compression test:  Negative  Lachman's Test:  negative  Anterior Drawer Test:  negative  Valgus Stress Test:  negative  Varus Stress Test:  negative     No ipsilateral hip pain with ROM    Imaging Studies: I have personally reviewed pertinent films in PACS  X-rays right knee- mild arthritis  MRI right knee- medial meniscal tear, arthritis

## 2018-09-07 ENCOUNTER — ANESTHESIA EVENT (OUTPATIENT)
Dept: PERIOP | Facility: HOSPITAL | Age: 58
End: 2018-09-07
Payer: COMMERCIAL

## 2018-09-07 RX ORDER — SODIUM CHLORIDE 9 MG/ML
125 INJECTION, SOLUTION INTRAVENOUS CONTINUOUS
Status: CANCELLED | OUTPATIENT
Start: 2018-09-26

## 2018-09-10 ENCOUNTER — APPOINTMENT (OUTPATIENT)
Dept: PREADMISSION TESTING | Facility: HOSPITAL | Age: 58
End: 2018-09-10
Payer: COMMERCIAL

## 2018-09-10 ENCOUNTER — APPOINTMENT (OUTPATIENT)
Dept: LAB | Facility: HOSPITAL | Age: 58
End: 2018-09-10
Attending: ORTHOPAEDIC SURGERY
Payer: COMMERCIAL

## 2018-09-10 ENCOUNTER — HOSPITAL ENCOUNTER (OUTPATIENT)
Dept: NON INVASIVE DIAGNOSTICS | Facility: HOSPITAL | Age: 58
Discharge: HOME | End: 2018-09-10
Attending: ORTHOPAEDIC SURGERY
Payer: COMMERCIAL

## 2018-09-10 DIAGNOSIS — S83.241A TEAR OF MEDIAL MENISCUS OF RIGHT KNEE, UNSPECIFIED TEAR TYPE, UNSPECIFIED WHETHER OLD OR CURRENT TEAR, INITIAL ENCOUNTER: ICD-10-CM

## 2018-09-10 DIAGNOSIS — Z01.818 PREOPERATIVE TESTING: ICD-10-CM

## 2018-09-10 DIAGNOSIS — M17.11 PRIMARY OSTEOARTHRITIS OF RIGHT KNEE: ICD-10-CM

## 2018-09-10 LAB
ANION GAP SERPL CALCULATED.3IONS-SCNC: 7 MMOL/L (ref 4–13)
BASOPHILS # BLD AUTO: 0.04 THOUSANDS/ΜL (ref 0–0.1)
BASOPHILS NFR BLD AUTO: 1 % (ref 0–1)
BUN SERPL-MCNC: 10 MG/DL (ref 5–25)
CALCIUM SERPL-MCNC: 10.1 MG/DL (ref 8.3–10.1)
CHLORIDE SERPL-SCNC: 108 MMOL/L (ref 100–108)
CO2 SERPL-SCNC: 27 MMOL/L (ref 21–32)
CREAT SERPL-MCNC: 0.94 MG/DL (ref 0.6–1.3)
EOSINOPHIL # BLD AUTO: 0.47 THOUSAND/ΜL (ref 0–0.61)
EOSINOPHIL NFR BLD AUTO: 6 % (ref 0–6)
ERYTHROCYTE [DISTWIDTH] IN BLOOD BY AUTOMATED COUNT: 13.6 % (ref 11.6–15.1)
GFR SERPL CREATININE-BSD FRML MDRD: 67 ML/MIN/1.73SQ M
GLUCOSE SERPL-MCNC: 106 MG/DL (ref 65–140)
HCT VFR BLD AUTO: 43.9 % (ref 34.8–46.1)
HGB BLD-MCNC: 14.5 G/DL (ref 11.5–15.4)
IMM GRANULOCYTES # BLD AUTO: 0.06 THOUSAND/UL (ref 0–0.2)
IMM GRANULOCYTES NFR BLD AUTO: 1 % (ref 0–2)
LYMPHOCYTES # BLD AUTO: 1.95 THOUSANDS/ΜL (ref 0.6–4.47)
LYMPHOCYTES NFR BLD AUTO: 24 % (ref 14–44)
MCH RBC QN AUTO: 28.7 PG (ref 26.8–34.3)
MCHC RBC AUTO-ENTMCNC: 33 G/DL (ref 31.4–37.4)
MCV RBC AUTO: 87 FL (ref 82–98)
MONOCYTES # BLD AUTO: 0.67 THOUSAND/ΜL (ref 0.17–1.22)
MONOCYTES NFR BLD AUTO: 8 % (ref 4–12)
NEUTROPHILS # BLD AUTO: 4.98 THOUSANDS/ΜL (ref 1.85–7.62)
NEUTS SEG NFR BLD AUTO: 60 % (ref 43–75)
NRBC BLD AUTO-RTO: 0 /100 WBCS
PLATELET # BLD AUTO: 219 THOUSANDS/UL (ref 149–390)
PMV BLD AUTO: 11.2 FL (ref 8.9–12.7)
POTASSIUM SERPL-SCNC: 3.8 MMOL/L (ref 3.5–5.3)
RBC # BLD AUTO: 5.06 MILLION/UL (ref 3.81–5.12)
SODIUM SERPL-SCNC: 142 MMOL/L (ref 136–145)
WBC # BLD AUTO: 8.17 THOUSAND/UL (ref 4.31–10.16)

## 2018-09-10 PROCEDURE — 93005 ELECTROCARDIOGRAM TRACING: CPT

## 2018-09-10 PROCEDURE — 80048 BASIC METABOLIC PNL TOTAL CA: CPT

## 2018-09-10 PROCEDURE — 36415 COLL VENOUS BLD VENIPUNCTURE: CPT

## 2018-09-10 PROCEDURE — 85025 COMPLETE CBC W/AUTO DIFF WBC: CPT

## 2018-09-10 RX ORDER — ALPRAZOLAM 0.5 MG/1
0.5 TABLET ORAL 2 TIMES DAILY PRN
COMMUNITY
End: 2019-11-21 | Stop reason: SDUPTHER

## 2018-09-10 RX ORDER — MULTIVITAMIN
1 CAPSULE ORAL DAILY
COMMUNITY

## 2018-09-10 RX ORDER — ACETAMINOPHEN 500 MG
500 TABLET ORAL EVERY 6 HOURS PRN
COMMUNITY

## 2018-09-10 NOTE — ANESTHESIA PREPROCEDURE EVALUATION
Review of Systems/Medical History  Patient summary reviewed  Chart reviewed  No history of anesthetic complications     Cardiovascular  Negative cardio ROS    Pulmonary  Negative pulmonary ROS        GI/Hepatic  Negative GI/hepatic ROS          Negative  ROS        Endo/Other    Obesity    GYN  Negative gynecology ROS          Hematology  Negative hematology ROS      Musculoskeletal    Arthritis     Neurology  Negative neurology ROS      Psychology   Anxiety, Depression , bipolar disorder and being treated for depression,              Physical Exam    Airway    Mallampati score: II  TM Distance: >3 FB  Neck ROM: full     Dental       Cardiovascular  Comment: Negative ROS, Rhythm: regular, Rate: normal,     Pulmonary  Breath sounds clear to auscultation,     Other Findings  implant      Anesthesia Plan  ASA Score- 2     Anesthesia Type- general with ASA Monitors  Additional Monitors:   Airway Plan:         Plan Factors- Patient instructed to abstain from smoking on day of procedure  Patient did not smoke on day of surgery  Induction- intravenous  Postoperative Plan- Plan for postoperative opioid use  Informed Consent- Anesthetic plan and risks discussed with patient

## 2018-09-10 NOTE — PRE-PROCEDURE INSTRUCTIONS
Pre-Surgery Instructions:   Medication Instructions    acetaminophen (TYLENOL) 500 mg tablet Patient was instructed by Physician and understands   ALPRAZolam (XANAX) 0 5 mg tablet Patient was instructed by Physician and understands   aspirin 500 MG buffered tablet Patient was instructed by Physician and understands   clonazePAM (KlonoPIN) 0 5 mg tablet Patient was instructed by Physician and understands   lithium carbonate 150 mg capsule Patient was instructed by Physician and understands   Multiple Vitamin (MULTIVITAMIN) capsule Patient was instructed by Physician and understands   traZODone (DESYREL) 50 mg tablet Patient was instructed by Physician and understands   venlafaxine (EFFEXOR) 50 mg tablet Patient was instructed by Physician and understands  Seen by Dr Donis Guzman and was told to stop NSAIDS and supplements one week preop and on DOS with surgery, if desired, she may take Xanax and Effexor with sip of water  Instructed on use of Chlorhexidine for preoperative bathing per hospital protocol

## 2018-09-12 LAB
ATRIAL RATE: 50 BPM
P AXIS: 13 DEGREES
PR INTERVAL: 136 MS
QRS AXIS: 24 DEGREES
QRSD INTERVAL: 88 MS
QT INTERVAL: 312 MS
QTC INTERVAL: 284 MS
T WAVE AXIS: 53 DEGREES
VENTRICULAR RATE: 50 BPM

## 2018-09-12 PROCEDURE — 93010 ELECTROCARDIOGRAM REPORT: CPT | Performed by: INTERNAL MEDICINE

## 2018-09-19 NOTE — PROGRESS NOTES
Rosemary Jeramy will be discharged from outpatient physical therapy care due to expiration of plan of care  Last attended tx session was on 8/22 ;did not return to therapy after this date  No objective measures updated, WINSOME is not present at time of discharge

## 2018-09-26 ENCOUNTER — ANESTHESIA (OUTPATIENT)
Dept: PERIOP | Facility: HOSPITAL | Age: 58
End: 2018-09-26
Payer: COMMERCIAL

## 2018-09-27 ENCOUNTER — HOSPITAL ENCOUNTER (OUTPATIENT)
Facility: HOSPITAL | Age: 58
Setting detail: OUTPATIENT SURGERY
Discharge: HOME | End: 2018-09-27
Attending: ORTHOPAEDIC SURGERY | Admitting: ORTHOPAEDIC SURGERY
Payer: COMMERCIAL

## 2018-09-27 VITALS
TEMPERATURE: 98.8 F | BODY MASS INDEX: 39.14 KG/M2 | OXYGEN SATURATION: 96 % | DIASTOLIC BLOOD PRESSURE: 59 MMHG | HEART RATE: 53 BPM | RESPIRATION RATE: 15 BRPM | WEIGHT: 229.28 LBS | HEIGHT: 64 IN | SYSTOLIC BLOOD PRESSURE: 123 MMHG

## 2018-09-27 DIAGNOSIS — S83.241D TEAR OF MEDIAL MENISCUS OF RIGHT KNEE, UNSPECIFIED TEAR TYPE, UNSPECIFIED WHETHER OLD OR CURRENT TEAR, SUBSEQUENT ENCOUNTER: ICD-10-CM

## 2018-09-27 DIAGNOSIS — M17.11 PRIMARY OSTEOARTHRITIS OF RIGHT KNEE: Primary | ICD-10-CM

## 2018-09-27 PROCEDURE — 29881 ARTHRS KNE SRG MNISECTMY M/L: CPT | Performed by: ORTHOPAEDIC SURGERY

## 2018-09-27 RX ORDER — LIDOCAINE HYDROCHLORIDE 10 MG/ML
INJECTION, SOLUTION INFILTRATION; PERINEURAL AS NEEDED
Status: DISCONTINUED | OUTPATIENT
Start: 2018-09-27 | End: 2018-09-27 | Stop reason: SURG

## 2018-09-27 RX ORDER — SODIUM CHLORIDE 9 MG/ML
125 INJECTION, SOLUTION INTRAVENOUS CONTINUOUS
Status: DISCONTINUED | OUTPATIENT
Start: 2018-09-27 | End: 2018-09-27 | Stop reason: HOSPADM

## 2018-09-27 RX ORDER — OXYCODONE HYDROCHLORIDE 5 MG/1
10 TABLET ORAL
Status: DISCONTINUED | OUTPATIENT
Start: 2018-09-27 | End: 2018-09-27 | Stop reason: HOSPADM

## 2018-09-27 RX ORDER — HYDROMORPHONE HCL/PF 1 MG/ML
0.5 SYRINGE (ML) INJECTION
Status: DISCONTINUED | OUTPATIENT
Start: 2018-09-27 | End: 2018-09-27 | Stop reason: HOSPADM

## 2018-09-27 RX ORDER — MIDAZOLAM HYDROCHLORIDE 1 MG/ML
INJECTION INTRAMUSCULAR; INTRAVENOUS AS NEEDED
Status: DISCONTINUED | OUTPATIENT
Start: 2018-09-27 | End: 2018-09-27 | Stop reason: SURG

## 2018-09-27 RX ORDER — ROPIVACAINE HYDROCHLORIDE 5 MG/ML
INJECTION, SOLUTION EPIDURAL; INFILTRATION; PERINEURAL AS NEEDED
Status: DISCONTINUED | OUTPATIENT
Start: 2018-09-27 | End: 2018-09-27 | Stop reason: SURG

## 2018-09-27 RX ORDER — ACETAMINOPHEN 325 MG/1
650 TABLET ORAL ONCE
Status: DISCONTINUED | OUTPATIENT
Start: 2018-09-27 | End: 2018-09-27 | Stop reason: HOSPADM

## 2018-09-27 RX ORDER — OXYCODONE HYDROCHLORIDE 5 MG/1
TABLET ORAL
Qty: 40 TABLET | Refills: 0 | Status: SHIPPED | OUTPATIENT
Start: 2018-09-27 | End: 2019-05-07 | Stop reason: ALTCHOICE

## 2018-09-27 RX ORDER — PROPOFOL 10 MG/ML
INJECTION, EMULSION INTRAVENOUS AS NEEDED
Status: DISCONTINUED | OUTPATIENT
Start: 2018-09-27 | End: 2018-09-27 | Stop reason: SURG

## 2018-09-27 RX ORDER — ACETAMINOPHEN 325 MG/1
650 TABLET ORAL EVERY 6 HOURS PRN
Status: DISCONTINUED | OUTPATIENT
Start: 2018-09-27 | End: 2018-09-27 | Stop reason: HOSPADM

## 2018-09-27 RX ORDER — FENTANYL CITRATE 50 UG/ML
INJECTION, SOLUTION INTRAMUSCULAR; INTRAVENOUS AS NEEDED
Status: DISCONTINUED | OUTPATIENT
Start: 2018-09-27 | End: 2018-09-27 | Stop reason: SURG

## 2018-09-27 RX ORDER — FENTANYL CITRATE/PF 50 MCG/ML
50 SYRINGE (ML) INJECTION
Status: DISCONTINUED | OUTPATIENT
Start: 2018-09-27 | End: 2018-09-27 | Stop reason: HOSPADM

## 2018-09-27 RX ORDER — MAGNESIUM HYDROXIDE 1200 MG/15ML
LIQUID ORAL AS NEEDED
Status: DISCONTINUED | OUTPATIENT
Start: 2018-09-27 | End: 2018-09-27 | Stop reason: HOSPADM

## 2018-09-27 RX ORDER — PROMETHAZINE HYDROCHLORIDE 12.5 MG/1
12.5 TABLET ORAL EVERY 6 HOURS PRN
Qty: 20 TABLET | Refills: 0 | Status: SHIPPED | OUTPATIENT
Start: 2018-09-27 | End: 2019-07-09 | Stop reason: ALTCHOICE

## 2018-09-27 RX ORDER — LORATADINE 10 MG/1
10 TABLET ORAL DAILY PRN
COMMUNITY
End: 2019-07-09 | Stop reason: ALTCHOICE

## 2018-09-27 RX ORDER — ONDANSETRON 2 MG/ML
4 INJECTION INTRAMUSCULAR; INTRAVENOUS ONCE AS NEEDED
Status: COMPLETED | OUTPATIENT
Start: 2018-09-27 | End: 2018-09-27

## 2018-09-27 RX ORDER — CLINDAMYCIN PHOSPHATE 900 MG/50ML
900 INJECTION INTRAVENOUS ONCE
Status: DISCONTINUED | OUTPATIENT
Start: 2018-09-27 | End: 2018-09-27

## 2018-09-27 RX ORDER — LIDOCAINE HYDROCHLORIDE AND EPINEPHRINE BITARTRATE 20; .01 MG/ML; MG/ML
INJECTION, SOLUTION SUBCUTANEOUS AS NEEDED
Status: DISCONTINUED | OUTPATIENT
Start: 2018-09-27 | End: 2018-09-27 | Stop reason: HOSPADM

## 2018-09-27 RX ORDER — OXYCODONE HYDROCHLORIDE 5 MG/1
5 TABLET ORAL
Status: DISCONTINUED | OUTPATIENT
Start: 2018-09-27 | End: 2018-09-27 | Stop reason: HOSPADM

## 2018-09-27 RX ORDER — ASPIRIN 325 MG
325 TABLET, DELAYED RELEASE (ENTERIC COATED) ORAL 2 TIMES DAILY
Qty: 28 TABLET | Refills: 0 | Status: SHIPPED | OUTPATIENT
Start: 2018-09-27 | End: 2021-09-14 | Stop reason: ALTCHOICE

## 2018-09-27 RX ORDER — ONDANSETRON 2 MG/ML
INJECTION INTRAMUSCULAR; INTRAVENOUS AS NEEDED
Status: DISCONTINUED | OUTPATIENT
Start: 2018-09-27 | End: 2018-09-27 | Stop reason: SURG

## 2018-09-27 RX ADMIN — ACETAMINOPHEN 650 MG: 325 TABLET, FILM COATED ORAL at 17:14

## 2018-09-27 RX ADMIN — LIDOCAINE HYDROCHLORIDE 3 ML: 20 INJECTION, SOLUTION INTRAVENOUS at 14:02

## 2018-09-27 RX ADMIN — ROPIVACAINE HYDROCHLORIDE 30 ML: 5 INJECTION, SOLUTION EPIDURAL; INFILTRATION; PERINEURAL at 14:03

## 2018-09-27 RX ADMIN — ONDANSETRON 4 MG: 2 INJECTION INTRAMUSCULAR; INTRAVENOUS at 16:19

## 2018-09-27 RX ADMIN — FENTANYL CITRATE 25 MCG: 50 INJECTION, SOLUTION INTRAMUSCULAR; INTRAVENOUS at 14:47

## 2018-09-27 RX ADMIN — CEFAZOLIN SODIUM 2000 MG: 2 SOLUTION INTRAVENOUS at 14:23

## 2018-09-27 RX ADMIN — DEXAMETHASONE SODIUM PHOSPHATE 4 MG: 10 INJECTION INTRAMUSCULAR; INTRAVENOUS at 14:24

## 2018-09-27 RX ADMIN — MIDAZOLAM 2 MG: 1 INJECTION INTRAMUSCULAR; INTRAVENOUS at 14:01

## 2018-09-27 RX ADMIN — SODIUM CHLORIDE: 0.9 INJECTION, SOLUTION INTRAVENOUS at 15:10

## 2018-09-27 RX ADMIN — FENTANYL CITRATE 25 MCG: 50 INJECTION, SOLUTION INTRAMUSCULAR; INTRAVENOUS at 15:09

## 2018-09-27 RX ADMIN — LIDOCAINE HYDROCHLORIDE 60 MG: 10 INJECTION, SOLUTION INFILTRATION; PERINEURAL at 14:17

## 2018-09-27 RX ADMIN — FENTANYL CITRATE 100 MCG: 50 INJECTION, SOLUTION INTRAMUSCULAR; INTRAVENOUS at 14:01

## 2018-09-27 RX ADMIN — ONDANSETRON HYDROCHLORIDE 4 MG: 2 INJECTION, SOLUTION INTRAVENOUS at 14:24

## 2018-09-27 RX ADMIN — PROPOFOL 200 MG: 10 INJECTION, EMULSION INTRAVENOUS at 14:17

## 2018-09-27 RX ADMIN — FENTANYL CITRATE 25 MCG: 50 INJECTION, SOLUTION INTRAMUSCULAR; INTRAVENOUS at 14:28

## 2018-09-27 RX ADMIN — FENTANYL CITRATE 25 MCG: 50 INJECTION, SOLUTION INTRAMUSCULAR; INTRAVENOUS at 15:11

## 2018-09-27 RX ADMIN — SODIUM CHLORIDE 125 ML/HR: 0.9 INJECTION, SOLUTION INTRAVENOUS at 12:33

## 2018-09-27 NOTE — DISCHARGE INSTRUCTIONS
[facility]    Postoperative Instructions  Knee Arthroscopy    WOUND CARE: You may remove your dressing in 3 days  Replace the dressing with band aids over the incisions  Do not get your incisions wet for the next 3 days  After 3 days you may shower and let the water run over your incisions  Pat dry and place clean band-aids over your incisions daily  If purulent drainage (thick white, yellow or greenish in color) is coming from the wound, youre having drainage beyond 5 days, you notice any increasing redness around the wound, or you have a temperature greater than 101 degrees please let your doctor or the doctor on call know  WEIGHT BEARING:   You are permitted to bear weight as tolerated on your operative leg  Use crutches for the first 2-3 days until you are comfortable enough to walk  After the first few days you are permitted to move your knee to tolerance  Avoid deep knee bends, kneeling, and stooping  It is important to get up and move around to prevent blood clots  PAIN/SWELLING:  To help reduce swelling of the knee, remember to elevate the operative leg  Elevate for 30 minutes every 2 hours initially  Be sure to also use ice at least 3-4 times a day  Cover your dressing with a thin towel or pillowcase  Put ice in a zip lock bag or towel and place over your surgical site  You can alternate for 20 minutes with the ice on and then 20 minutes with the ice off  Use narcotic pain medication as prescribed  Try to wean down as tolerated  These medications can cause constipation and you may want to use an over the counter stool softener  Nausea and vomiting can also be a side effect of narcotic medications  You have been provided a prescription medication for nausea and/or vomiting to be used as needed  If the narcotic you have been given does not contain Tylenol, you may use it alongside your prescription pain medication    Do not take any medications that you are allergic to  If a refill of medication is needed, please call the office during regular business hours Monday through Friday  In general, refills will not be made after hours or on weekends so please plan ahead  BLOOD CLOT  For the first 2 weeks after surgery, please take Aspirin 325mg   PREVENTION: twice daily by mouth to prevent blood clots while you are less active  DRIVING:   You are not permitted to drive until you follow up in the office  You can no longer be on narcotic pain medication and must feel strong and alert to drive  FOLLOW UP:  You should follow up in the office 2 weeks from the day of surgery  [facility]   [address] [facility] , [address] [place] [address], [address] [facility]   [phone]    We wish you a rapid and comfortable recovery!

## 2018-09-27 NOTE — OP NOTE
OPERATIVE REPORT  PATIENT NAME: Teodoro Hdez    :  1960  MRN: [id number]  Pt Location: [facility] OR ROOM 01    SURGERY DATE: 2018    Surgeon(s) and Role:     * Ronald Byers DO - Primary     * Radha Youssef PA-C - Observing    Preop Diagnosis:  Primary osteoarthritis of right knee [M17 11]  Tear of medial meniscus of right knee, unspecified tear type, unspecified whether old or current tear, initial encounter [R43 241A]    Post-Op Diagnosis Codes:     * Primary osteoarthritis of right knee [M17 11]     * Tear of medial meniscus of right knee, unspecified tear type, unspecified whether old or current tear, initial encounter [S83 241A]    Procedure(s) (LRB):  ARTHROSCOPY KNEE; PARTIAL MEDIAL MENISECTOMY; CHONDROPLASTY (Right)    Specimen(s):  * No specimens in log *    Estimated Blood Loss:   Minimal    Drains:   none    Anesthesia Type: Intraarticular block, GA    Operative Indications:  Primary osteoarthritis of right knee [M17 11]  Tear of medial meniscus of right knee, unspecified tear type, unspecified whether old or current tear, initial encounter [S83 241A]    Operative Findings:  See below    Complications:   None    Procedure and Technique:  INDICATIONS FOR PROCEDURE:  The patients is a 60 y o  female who presented to the office with right knee pain  MRI revealed a medial meniscal tear  After trial and ultimate failure of conservative management surgery was recommended  Extensive counseling in regards to the reasons for surgical intervention as well as the risks and benefits of surgery were reviewed  The risks include, but are not limited to infection, wound healing problems, persistent pain and stiffness, blood clots, extensive blood loss, need for additional surgery, damage to blood vessels and nerves  The patient understood and agreed to by oral and written consent      OPERATIVE PROCEDURE:  The patient was identified as WINSOME Noyola by Her ID bracelet by the surgical staff in the preoperative area at [address] [facility]   An intraarticular block was performed and the patient was wheeled back to the surgical room and placed on the operative table  Anesthesia was administered and the patient was intubated without complications  Preoperative antibiotics were given  The patient was remained in the supine position and all bony prominences were carefully protected  A tourniquet was applied to the patients right lower extremity  The right leg was then prepped and draped in the usual sterile fashion  A timeout was performed where the patients name and surgical site were once again identified  The incisions were marked out  5mL of 1% Lidocaine with epinephine was injected in the anterolateral portal and 5mL of % Lidocaine with epinephrine was injected in the anteromedial portal       A stab incision was made over the anterolateral aspect of the knee and the trocar was placed in the patellofemoral joint  The camera was inserted  Examination of the patellofemoral joint took place  Grade 2 cartilage changes were noted on the patella  Next, the medial compartment was entered and the medial portal was established under direct visualization  Examination of medial compartment was performed  The patient was found to have a radial medial meniscal tear and cartilage changes  Grade 3 cartilage changes were noted on the medial femoral condyle and grade 2 were noted on the tibial plateau  Using biters and a shaver the damaged portion of the medial meniscus was resected leaving behind a stable rim  Then the intraarticular notch was entered and the ACL was found to be intact and stable  Next, the lateral compartment was entered and examined  The lateral meniscus was found to be intact  Grade 1 cartilage changes were noted on the lateral femoral condyle and grade 1 cartilage changes were noted on the lateral tibial plateau  Finally we returned to the patellofemoral joint    Grade 2 cartilage changes were noted on the trochlea  Photos were taken throughout the case  The knee was copiously irrigated  The portals were closed with subcutanous Monocryl suture and steri strips were placed  A sterile dressing was then placed  The patient was then awakened and transferred to the stretcher and then to the recovery room in stable condition  I attest that I was present and performed this entire procedure    A qualified resident physician was not available    Patient Disposition:  extubated and stable    SIGNATURE: Jacy Moses DO  DATE: September 27, 2018  TIME: 5:07 PM

## 2018-09-27 NOTE — PROGRESS NOTES
Progress Note - Orthopedics   WINSOME Noyola 60 y o  female MRN: [id number]  Unit/Bed#: OR POOL Encounter: [id number]    Assessment:  60 y o  female with right knee medial meniscal tear, arthritis    Plan: For right knee arthroscopy today  NPO  Consented  Pain control prn    Subjective: Pt S&E in preop holding  No further questions  Ready for OR  Vitals: Blood pressure 100/55, pulse (!) 51, temperature 98 7 °F (37 1 °C), temperature source Tympanic, resp  rate 18, height 5' 4" (1 626 m), weight 104 kg (229 lb 4 5 oz), SpO2 97 %  ,Body mass index is 39 36 kg/m²        Intake/Output Summary (Last 24 hours) at 09/27/18 1523  Last data filed at 09/27/18 1510   Gross per 24 hour   Intake              900 ml   Output                0 ml   Net              900 ml       Invasive Devices     Peripheral Intravenous Line            Peripheral IV 09/27/18 Right Hand less than 1 day                Ortho Exam: rightLE: sensation grossly intact L4, L5, S1, palpable pedal pulse, EHL/AT/GS intact  Knee:  Skin intact

## 2018-09-27 NOTE — ANESTHESIA PROCEDURE NOTES
Peripheral Block    Patient location during procedure: holding area  Start time: 9/27/2018 2:01 PM  Removal time: 9/27/2018 2:03 PM  Reason for block: post-op pain management  Staffing  Anesthesiologist: Wilda Diego  Performed: anesthesiologist   Preanesthetic Checklist  Completed: patient identified, site marked, surgical consent, pre-op evaluation, timeout performed, IV checked, risks and benefits discussed and monitors and equipment checked  Peripheral Block  Patient position: supine  Prep: ChloraPrep  Patient monitoring: heart rate, cardiac monitor, continuous pulse ox and frequent blood pressure checks  Block type:  Intra-articular  Laterality: right  Injection technique: single-shot  Procedures: ISA technique  Local infiltration: ropivacaine  Infiltration strength: 0 5 %  Dose: 30 mL  Needle  Needle type: short-bevel   Needle gauge: 18   Needle localization: anatomical landmarks  Test dose: negative  Assessment  Injection assessment: incremental injection and negative aspiration for heme  Paresthesia pain: none  Heart rate change: no  Slow fractionated injection: yes  Post-procedure:  site cleaned  patient tolerated the procedure well with no immediate complications

## 2018-09-27 NOTE — H&P (VIEW-ONLY)
Assessment/Plan     1  Primary osteoarthritis of right knee    2  Tear of medial meniscus of right knee, unspecified tear type, unspecified whether old or current tear, initial encounter    3  Preoperative testing      Orders Placed This Encounter   Procedures    Basic metabolic panel    CBC and differential    Ambulatory referral to [facility]    EKG 12 lead     Ms Diamante Saldivar has a meniscal tear and mild arthritis  We discussed her treatment options today as well as the risks and benefits of her treatment options  She has tried physical therapy as well as a steroid injection in the past   She has tried a brace as well as NSAIDs  She continues to have pain  She would like to move forward with a surgical arthroscopy of the right knee  The risks of surgery include, but are not limited to infection, blood clot, wound healing problems, blood loss, damage to blood vessels and nerves, persistent pain and stiffness, need for additional surgery, heart attack, stroke, death  The patient understood and agreed to by oral and written consent  I answered all questions regarding surgery  She has a direct phone number to the office for any further questions  She will follow up 1 week from date of surgery  She will get clearance from her PCP  She will follow up sooner if needed  Return for 1 week after surgery  I answered all of the patient's questions during the visit and provided education of the patient's condition during the visit  The patient verbalized understanding of the information given and agrees with the plan  This note was dictated using [employer] software  It may contain errors including improperly dictated words  Please contact physician directly for any questions  History of Present Illness   Chief complaint:   Chief Complaint   Patient presents with    Right Knee - Pain       HPI: Radha Lock is a 60 y o  female that c/o right knee pain that started in May    She had no falls, traumas, or injuries  She  went to stand up and take a step and felt increased knee pain  Her knee pain is medial   The pain does not radiate  The pain is there most of the time  It feels better when she 1st wakes up but gets worse throughout the day  It is worse with weightbearing  It is a sharp, stabbing pain  She has been taking Advil which does help occasionally  She feels her knee is stable  She denies any locking, clicking, or popping  She has been doing physical therapy for the past 3 weeks  She states it has not been helpful  She does have a knee brace that she bought over-the-counter which does help somewhat  She did have a steroid injection on July 27th which helped for about 2-3 weeks  She has not had any other injections  She has not had any surgeries on this knee  ROS:    See HPI for musculoskeletal review  All other systems reviewed are negative     Historical Information   Past Medical History:   Diagnosis Date    Bipolar 1 disorder ([facility] [address] )      Past Surgical History:   Procedure Laterality Date    NASAL SEPTUM SURGERY      TONSILECTOMY AND ADNOIDECTOMY       Social History   History   Alcohol use Not on file     History   Drug use: Unknown     History   Smoking Status    Never Smoker   Smokeless Tobacco    Never Used     Family History:   Family History   Problem Relation Age of Onset    No Known Problems Mother     No Known Problems Father        Meds/Allergies     (Not in a hospital admission)  Allergies   Allergen Reactions    Cephalosporins     Penicillins        Objective   Vitals: Blood pressure 108/66, pulse 61, height 5' 4" (1 626 m), weight 103 kg (228 lb)  ,Body mass index is 39 14 kg/m²      PE:  AAOx 3  WDWN  Hearing intact, no drainage from eyes  Regular rate  no audible wheezing  no abdominal distension  LE compartments soft, skin intact  EHL/AT/GS intact, sensation grossly intact L4, L5, S1, palpable pedal pulse    rightknee:    Appearance:  no swelling   No bruising  no obvious joint deformity   No effusion  Palpation/Tenderness:  +TTP over medial joint line, no TTP over lateral joint line or over patella/patellar tendon  Active Range of Motion:  AROM: full  Special Tests:  Medial Aristeo's Test:  Positive  Lateral Aristeo's Test:  Positive  Apley's compression test:  Negative  Lachman's Test:  negative  Anterior Drawer Test:  negative  Valgus Stress Test:  negative  Varus Stress Test:  negative     No ipsilateral hip pain with ROM    Imaging Studies: I have personally reviewed pertinent films in PACS  X-rays right knee- mild arthritis  MRI right knee- medial meniscal tear, arthritis

## 2018-10-02 ENCOUNTER — OFFICE VISIT (OUTPATIENT)
Dept: OBGYN CLINIC | Facility: MEDICAL CENTER | Age: 58
End: 2018-10-02

## 2018-10-02 VITALS
SYSTOLIC BLOOD PRESSURE: 130 MMHG | WEIGHT: 228 LBS | BODY MASS INDEX: 38.93 KG/M2 | DIASTOLIC BLOOD PRESSURE: 72 MMHG | HEIGHT: 64 IN | HEART RATE: 84 BPM

## 2018-10-02 DIAGNOSIS — Z98.890 S/P RIGHT KNEE ARTHROSCOPY: Primary | ICD-10-CM

## 2018-10-02 PROCEDURE — 99024 POSTOP FOLLOW-UP VISIT: CPT | Performed by: ORTHOPAEDIC SURGERY

## 2018-10-02 NOTE — PROGRESS NOTES
Assessment/Plan:  1  S/P right knee arthroscopy      Orders Placed This Encounter   Procedures    Ambulatory referral to Physical Therapy     Start PT  Pain control prn- tylenol prn pain  Continue ASA for DVT prophylaxis until more active  Pictures from surgery reviewed    Return in about 1 week (around 10/9/2018)  I answered all of the patient's questions during the visit and provided education of the patient's condition during the visit  The patient verbalized understanding of the information given and agrees with the plan  This note was dictated using [employer] software  It may contain errors including improperly dictated words  Please contact physician directly for any questions  Subjective   Chief Complaint:   Chief Complaint   Patient presents with    Right Knee - Chloe Ormond is a 60 y o  female who presents for 1 week follow up s/p right knee arthroscopy  Overall she is doing well  She is not taking oxycodone on Sunday  She has been taking aspirin for DVT prophylaxis  She has been doing some gentle range of motion  She admits stiffness  Review of Systems  ROS:    See HPI for musculoskeletal review  All other systems reviewed are negative     History:  Past Medical History:   Diagnosis Date    Anxiety     Arthritis     Back pain     Lower back    Bipolar 1 disorder (HCC)     Depression     Diverticulosis     Hip dysplasia     Bilateral    Hyperlipidemia     Knee pain, right     Obesity     Psoriasis     Along hairline      Stress incontinence      Past Surgical History:   Procedure Laterality Date    COLONOSCOPY      DILATION AND CURETTAGE OF UTERUS      One D&C and one D&E    ESOPHAGOGASTRODUODENOSCOPY      NASAL SEPTUM SURGERY      [place] KNEE SCOPE,MED/LAT MENISECTOMY Right 9/27/2018    Procedure: ARTHROSCOPY KNEE; PARTIAL MEDIAL MENISECTOMY; CHONDROPLASTY;  Surgeon: Corwin Rueda DO;  Location: [facility];  Service: Orthopedics    TONSILECTOMY AND ADNOIDECTOMY      TONSILLECTOMY       Social History   History   Alcohol Use    Yes     Comment: 5 yearly     History   Drug Use No     History   Smoking Status    Never Smoker   Smokeless Tobacco    Never Used     Family History:   Family History   Problem Relation Age of Onset    No Known Problems Mother     No Known Problems Father        Meds/Allergies     (Not in a hospital admission)  Allergies   Allergen Reactions    Banana Hives     Itching and swelling     Crab (Diagnostic) Hives    Cleocin [Clindamycin]      Causes C Diff    Latex      Pt told not to be in contact with latex because of banana allergy    Penicillins Other (See Comments)     Causes vaginal itching and must take Diflucan with it          Objective     /72   Pulse 84   Ht 5' 4" (1 626 m)   Wt 103 kg (228 lb)   BMI 39 14 kg/m²      PE:  AAOx 3  WDWN  Hearing intact, no drainage from eyes  no audible wheezing  no abdominal distension  LE compartments soft, AT/GS intact    Ortho Exam:  right Knee:   INCs: C/D/I, No erythema, mild swelling  AROM: 10-90

## 2018-10-03 ENCOUNTER — EVALUATION (OUTPATIENT)
Dept: PHYSICAL THERAPY | Facility: CLINIC | Age: 58
End: 2018-10-03
Payer: COMMERCIAL

## 2018-10-03 DIAGNOSIS — S83.241D ACUTE MEDIAL MENISCUS TEAR OF RIGHT KNEE, SUBSEQUENT ENCOUNTER: ICD-10-CM

## 2018-10-03 DIAGNOSIS — Z98.890 S/P RIGHT KNEE ARTHROSCOPY: Primary | ICD-10-CM

## 2018-10-03 PROCEDURE — G8979 MOBILITY GOAL STATUS: HCPCS

## 2018-10-03 PROCEDURE — G8978 MOBILITY CURRENT STATUS: HCPCS

## 2018-10-03 PROCEDURE — 97110 THERAPEUTIC EXERCISES: CPT

## 2018-10-03 PROCEDURE — 97162 PT EVAL MOD COMPLEX 30 MIN: CPT

## 2018-10-03 NOTE — PROGRESS NOTES
PT Evaluation     Today's date: 10/3/2018  Patient name: Sherine Vivar  : 1960  MRN: [id number]  Referring provider: Sherren Rede, MD  Dx:   Encounter Diagnosis     ICD-10-CM    1  S/P right knee arthroscopy Z98 890    2  Acute medial meniscus tear of right knee, subsequent encounter Q89 224C                   Assessment  Impairments: abnormal gait, abnormal or restricted ROM, activity intolerance, impaired physical strength, lacks appropriate home exercise program and pain with function  Functional limitations: unable to work, unable to do yardwork, unable to do heavy housework, diff with stair climbing  Assessment details: Sherine Vivar is a 60 y o  Female who works full time as an LPN for [employer],  presenting to outpatient physical therapy with diagnosis of S/P right knee arthroscopy   Acute medial meniscus tear of right knee, subsequent encounter  Patient presents with minimal R knee  pain, reduced range of motion of R knee , reduced strength R knee flex and ext , and reduced functional activity tolerance  Patient to benefit from skilled outpatient physical therapy to reduce pain, maximize pain free range of motion, increase strength and stability, and improve functional mobility/functional activity in order to maximize return to prior level of function with reduced limitations  Thank you for your referral     Barriers to therapy: none  Understanding of Dx/Px/POC: good  Goals  STGs to be achieved in 4 weeks:  1  Pt to demonstrate reduced subjective pain rating "at worst" by at least 2-3 points from Initial Eval in order to allow for reduced pain with ADLs and improved functional activity tolerance  2  Pt to demonstrate increased AROM of R knee  by at least 5-10 degrees in order to allow for greater ease and independence with ADLs and functional mobility     3  Pt to demonstrate full AROM of R knee  in order to maximize joint mobility and function and allow for progression of exercise program and achievement of goals  4  Pt to demonstrate increased MMT of R knee  by at least 1/2-1 grade in order to improve safety and stability with ADLs and functional mobility  LTGs to be achieved in 6-8 weeks:  1  Pt will be I with HEP in order to continue to improve quality of life and independence and reduce risk for re-injury  2  Pt to demonstrate return to work without limitations or restrictions  3  Pt to demonstrate improved function as noted by achieving or exceeding predicted score on FOTO outcomes assessment tool  Plan  Patient would benefit from: skilled physical therapy  Planned modality interventions: cryotherapy  Planned therapy interventions: manual therapy, strengthening, stretching, therapeutic exercise, home exercise program and gait training  Frequency: 2x week  Duration in visits: 12  Duration in weeks: 6  Plan of Care beginning date: 10/3/2018  Plan of Care expiration date: 2018  Treatment plan discussed with: patient        Re-eval Date: 18    Date 10/3/18       Visit Count 1       FOTO yes       Pain In 0       Pain Out 0             Subjective Evaluation    History of Present Illness  Date of onset: 2018  Date of surgery: 2018  Mechanism of injury: Pt stood up from couch and took a step and got strange pain in medial R knee  Pt let pain go for a while and sought med attn late   Had cortisone inj in July that lasted for 2 weeks  Had therapy for 2-3 weeks and had no pain relief  Had MRI prior to PT which showed R MMT  Pt decided to have R knee scope after failed conservative tx    Not a recurrent problem    Quality of life: good    Pain  Current pain ratin  At best pain ratin  At worst pain ratin (with wt bearing)  Quality: sharp and pulling  Relieving factors: change in position and rest  Aggravating factors: stair climbing and standing  Progression: improved    Social Support  Steps to enter house: yes (3)  Stairs in house: yes (12)   Lives in: multiple-level home  Lives with: spouse    Employment status: working (LPN - off on med leave)   Hand dominance: right  Exercise history: walks the dog      Diagnostic Tests  X-ray: normal  MRI studies: abnormal  Treatments  Previous treatment: physical therapy, injection treatment and medication  Current treatment: physical therapy  Patient Goals  Patient goals for therapy: decreased edema, decreased pain, increased motion, increased strength and return to work          Objective     Observations     Right Knee   Positive for edema and incision  Additional Observation Details  2 porthole incisions covered withsteri strips, no obvious drainage    Active Range of Motion   Left Knee   Flexion: 130 degrees   Extension: 5 degrees     Right Knee   Flexion: 88 degrees   Extension: 13 degrees     Passive Range of Motion     Right Knee   Flexion: 92 degrees   Extension: 10 degrees     Strength/Myotome Testing     Right Knee   Flexion: 4+  Extension: 4  Quadriceps contraction: good    Swelling     Left Knee Girth Measurement (cm)   Joint line: 42 7 cm    Right Knee Girth Measurement (cm)   Joint line: 43 5 cm    Ambulation     Ambulation: Stairs   Ascend stairs: independent  Pattern: non-reciprocal  Railings: two rails  Descend stairs: independent  Pattern: non-reciprocal  Railings: two rails  Curbs: independent    Observational Gait   Gait: antalgic   Stride length, left stance time, left step length and right step length within functional limits  Decreased walking speed and right stance time     Right foot contact pattern: heel to toe  Left arm swing: within functional limits  Right arm swing: within functional limits      Flowsheet Rows      Most Recent Value   PT/OT G-Codes   Current Score  53   Projected Score  61   FOTO information reviewed  Yes   Assessment Type  Evaluation   G code set  Mobility: Walking & Moving Around   Mobility: Walking and Moving Around Current Status ()  CK   Mobility: Walking and Moving Around Goal Status ()  CJ          Precautions: none    Daily Treatment Diary     Manual                                                                                   Exercise Diary  10/3            QS 10x5"            Heel slides 20            SAQs 20            SLRs 10            Sup Abd/add 20            LAQs 20            Seated march 20            Ham stretch 4x20"            Nustep L1  5 min            Standing march, Abd, ext             Leg press             Leg ext mach             Leg curl mach             Calf stretch on incline             Mini squats             Step ups                                                                     home             CP to R knee

## 2018-10-05 ENCOUNTER — OFFICE VISIT (OUTPATIENT)
Dept: PHYSICAL THERAPY | Facility: CLINIC | Age: 58
End: 2018-10-05
Payer: COMMERCIAL

## 2018-10-05 DIAGNOSIS — S83.241D ACUTE MEDIAL MENISCUS TEAR OF RIGHT KNEE, SUBSEQUENT ENCOUNTER: ICD-10-CM

## 2018-10-05 DIAGNOSIS — Z98.890 S/P RIGHT KNEE ARTHROSCOPY: Primary | ICD-10-CM

## 2018-10-05 PROCEDURE — 97110 THERAPEUTIC EXERCISES: CPT

## 2018-10-05 NOTE — PROGRESS NOTES
Daily Note     Today's date: 10/5/2018  Patient name: Norma Ramirez  : 1960  MRN: [id number]  Referring provider: Narinder Acevedo MD  Dx:   Encounter Diagnosis     ICD-10-CM    1  S/P right knee arthroscopy Z98 890    2  Acute medial meniscus tear of right knee, subsequent encounter S83 241D                 Re-eval Date: 18    Date 10/3/18 10/5/18      Visit Count 1 2      FOTO yes       Pain In 0 2      Pain Out 0           Subjective: states she is climbing up 4-5 stairs foot over foot, then changes to foot to foot gt  Reports mobility is improving      Objective: See treatment diary below      Assessment: Tolerated treatment well  Patient exhibited good technique with therapeutic exercises and would benefit from continued PT      Plan: Progress treatment as tolerated  Precautions: none    Daily Treatment Diary     Manual                                                                                   Exercise Diary  10/3 10/5           QS 10x5" 20x5"           Heel slides 20 30           SAQs 20 30           SLRs 10 3x10           Sup Abd/add 20 30           LAQs 20 30x3"           Seated march 20 30           Ham stretch 4x20" 4x20"           Nustep L1  5 min L2 10 min           Standing march, Abd, ext             Leg press  50#  30           Leg ext mach             Leg curl mach             Calf stretch on incline  4x20"           Mini squats             Step ups                                                                     home             CP to R knee                                         Pt given written and illustrated HEP and instr in same  Pt demonstrated good understanding of same

## 2018-10-09 ENCOUNTER — OFFICE VISIT (OUTPATIENT)
Dept: PHYSICAL THERAPY | Facility: CLINIC | Age: 58
End: 2018-10-09
Payer: COMMERCIAL

## 2018-10-09 DIAGNOSIS — Z98.890 S/P RIGHT KNEE ARTHROSCOPY: Primary | ICD-10-CM

## 2018-10-09 DIAGNOSIS — S83.241D ACUTE MEDIAL MENISCUS TEAR OF RIGHT KNEE, SUBSEQUENT ENCOUNTER: ICD-10-CM

## 2018-10-09 PROCEDURE — 97110 THERAPEUTIC EXERCISES: CPT

## 2018-10-09 NOTE — PROGRESS NOTES
Daily Note     Today's date: 10/9/2018  Patient name: Ching Adhikari  : 1960  MRN: [id number]  Referring provider: Akila Mcdonald MD  Dx:   Encounter Diagnosis     ICD-10-CM    1  S/P right knee arthroscopy Z98 890    2  Acute medial meniscus tear of right knee, subsequent encounter S83 241D                 Re-eval Date: 18    Date 10/3/18 10/5/18 10/9     Visit Count 1 2 3     FOTO yes       Pain In 0 2 0     Pain Out 0  0       Subjective: Pt states the swelling in her knee has gone down and its feeling much better  The rubber strap feeling superior to the R knee has resolved  Pt upset today due to having to put dog down  Objective: See treatment diary below      Assessment: Tolerated treatment well  Patient exhibited good technique with therapeutic exercises and would benefit from continued PT      Plan: Progress treatment as tolerated  Precautions: none    Daily Treatment Diary     Manual                                                                                   Exercise Diary  10/3 10/5 10/9          QS 10x5" 20x5" 20x5"          Heel slides 20 30 30          SAQs 20 30 30          SLRs 10 3x10 3x10          Sup Abd/add 20 30 30          LAQs 20 30x3" 30          Seated march 20 30 30          Ham stretch 4x20" 4x20" 4x20"          Nustep L1  5 min L2 10 min L2 10 min          Standing march, Abd, ext   30 ea          Leg press  50#  30 50#  30          Leg ext mach             Leg curl mach             Calf stretch on incline  4x20" 4x20"          Mini squats   15          Step ups   15                                                                  home             CP to R knee                                         Pt given written and illustrated HEP and instr in same at last visit    Pt demonstrated good understanding of same

## 2018-10-11 ENCOUNTER — OFFICE VISIT (OUTPATIENT)
Dept: PHYSICAL THERAPY | Facility: CLINIC | Age: 58
End: 2018-10-11
Payer: COMMERCIAL

## 2018-10-11 DIAGNOSIS — Z98.890 S/P RIGHT KNEE ARTHROSCOPY: Primary | ICD-10-CM

## 2018-10-11 PROCEDURE — 97110 THERAPEUTIC EXERCISES: CPT

## 2018-10-11 NOTE — PROGRESS NOTES
Daily Note     Today's date: 10/11/2018  Patient name: Norma Ramirez  : 1960  MRN: [id number]  Referring provider: Narinder Acevedo MD  Dx:   Encounter Diagnosis     ICD-10-CM    1  S/P right knee arthroscopy Z98 890      Re-eval Date: 18    Date 10/3/18 10/5/18 10/9 10 11 18    Visit Count 1 2 3 4    FOTO yes       Pain In 0 2 0 0    Pain Out 0  0 0                 Subjective:  No complaints or concerns voiced by pt  Today  Objective: See treatment diary below      Assessment: Tolerated treatment well  Patient able to increase reps for select exercises, as documented in treatment diary  Plan: Continue per plan of care  Precautions: none    Daily Treatment Diary     Manual     10 11 18                        Exercise Diary  10/3 10/5 10/9 10 11 18    QS 10x5" 20x5" 20x5" 25x 5"      Heel slides 20 30 30 40x      SAQs 20 30 30 40x      SLRs 10 3x10 3x10 3x10      Sup Abd/add 20 30 30 40x      LAQs 20 30x3" 30 40x      Seated march 20 30 30 40x      Ham stretch 4x20" 4x20" 4x20" 5x 20"      Nustep L1  5 min L2 10 min L2 10 min   L2 10 min    Standing march, Abd, ext   30 ea 30x ea    Leg press  50#  30 50#  30 50#  30    Leg ext mach          Leg curl mach          Calf stretch on incline  4x20" 4x20" 5x20"    Mini squats   15 20x      Step ups   15 20x  Up R, down L                                          home    10 11 18    CP to R knee    Deferred to home applic                      Pt given written and illustrated HEP and instr in same at last visit    Pt demonstrated good understanding of same

## 2018-10-16 ENCOUNTER — OFFICE VISIT (OUTPATIENT)
Dept: PHYSICAL THERAPY | Facility: CLINIC | Age: 58
End: 2018-10-16
Payer: COMMERCIAL

## 2018-10-16 DIAGNOSIS — Z98.890 S/P RIGHT KNEE ARTHROSCOPY: Primary | ICD-10-CM

## 2018-10-16 PROCEDURE — 97110 THERAPEUTIC EXERCISES: CPT

## 2018-10-16 NOTE — PROGRESS NOTES
Daily Note     Today's date: 10/16/2018  Patient name: Betty Villatoro  : 1960  MRN: [id number]  Referring provider: Hamlet Sanches MD  Dx:   Encounter Diagnosis     ICD-10-CM    1  S/P right knee arthroscopy Z98 890      Re-eval Date: 18    Date 10/3/18 10/5/18 10/9 10 11 18 10 16 18   Visit Count 1 2 3 4 5   FOTO yes       Pain In 0 2 0 0 1/10   Pain Out 0  0 0 0-1/10                Subjective:  Pt  States she is still getting pain @ medial aspect of R knee  Says, however she is able to go foot over foot down steps now  Objective: See treatment diary below      Assessment: Tolerated treatment fairly well; no increase in symptoms with performance of exercise program       Plan: Progress treatment as tolerated  Precautions: none    Daily Treatment Diary     Manual     10 11 18 10 16 18                       Exercise Diary  10/3 10/5 10/9 10  18 10 16 18   QS 10x5" 20x5" 20x5" 25x 5"   25x 5"   Heel slides 20 30 30 40x   45x   SAQs 20 30 30 40x   40x   SLRs 10 3x10 3x10 3x10   3x15   Sup Abd/add 20 30 30 40x   40x   LAQs 20 30x3" 30 40x   40x   Seated march 20 30 30 40x   45x   Ham stretch 4x20" 4x20" 4x20" 5x 20"   5x 20"   Nustep L1  5 min L2 10 min L2 10 min   L2 10 min L2 10 min   Standing march, Abd, ext   30 ea 30x ea 35x    *Ham Curls   30x   Leg press  50#  30 50#  30 50#  30 50#  40x   Leg ext mach          Leg curl mach          Calf stretch on incline  4x20" 4x20" 5x20" 5x20"   Mini squats   15 20x   20x   Step ups   15 20x  Up R, down L   25x  Up R, down L                                       home    10 11 18 10 16 18   CP to R knee    Deferred to home applic Deferred to home applic                     Pt given written and illustrated HEP and instr in same at last visit    Pt demonstrated good understanding of same

## 2018-10-17 ENCOUNTER — OFFICE VISIT (OUTPATIENT)
Dept: OBGYN CLINIC | Facility: MEDICAL CENTER | Age: 58
End: 2018-10-17

## 2018-10-17 VITALS
HEART RATE: 92 BPM | BODY MASS INDEX: 39.61 KG/M2 | DIASTOLIC BLOOD PRESSURE: 71 MMHG | HEIGHT: 64 IN | WEIGHT: 232 LBS | SYSTOLIC BLOOD PRESSURE: 106 MMHG

## 2018-10-17 DIAGNOSIS — Z98.890 STATUS POST ARTHROSCOPY OF RIGHT KNEE: Primary | ICD-10-CM

## 2018-10-17 PROCEDURE — 99024 POSTOP FOLLOW-UP VISIT: CPT | Performed by: ORTHOPAEDIC SURGERY

## 2018-10-17 NOTE — PROGRESS NOTES
Assessment/Plan:  1  Status post arthroscopy of right knee      No orders of the defined types were placed in this encounter  Continue with PT and transition to home exercises  Pain control prn  Will consider steroid injection at next visit if still having pain    Return in about 1 month (around 11/17/2018)  Subjective   Chief Complaint:   Chief Complaint   Patient presents with    Right Knee - Post-op, Follow-up       Charlette Galdino is a 60 y o  female who presents for 3  follow up s/p right knee arthroscopy on 9/27  She states she still having some medial knee pain  She states the pain is similar to the pain she had before surgery but not as severe  Her pain comes and goes  She states it is a sharp/burning pain  She is no longer taking her aspirin  She takes nothing for pain at this time  She has been doing physical therapy which has been going well  She would like to return back to work  Review of Systems  ROS:    See HPI for musculoskeletal review  All other systems reviewed are negative     History:  Past Medical History:   Diagnosis Date    Anxiety     Arthritis     Back pain     Lower back    Bipolar 1 disorder (HCC)     Depression     Diverticulosis     Hip dysplasia     Bilateral    Hyperlipidemia     Knee pain, right     Obesity     Psoriasis     Along hairline      Stress incontinence      Past Surgical History:   Procedure Laterality Date    COLONOSCOPY      DILATION AND CURETTAGE OF UTERUS      One D&C and one D&E    ESOPHAGOGASTRODUODENOSCOPY      NASAL SEPTUM SURGERY      [place] KNEE SCOPE,MED/LAT MENISECTOMY Right 9/27/2018    Procedure: ARTHROSCOPY KNEE; PARTIAL MEDIAL MENISECTOMY; CHONDROPLASTY;  Surgeon: Serge Vernon DO;  Location: [facility];  Service: Orthopedics    TONSILECTOMY AND ADNOIDECTOMY      TONSILLECTOMY       Social History   History   Alcohol Use    Yes     Comment: 5 yearly     History   Drug Use No     History   Smoking Status    Never Smoker   Smokeless Tobacco    Never Used     Family History:   Family History   Problem Relation Age of Onset    No Known Problems Mother     No Known Problems Father        Meds/Allergies     (Not in a hospital admission)  Allergies   Allergen Reactions    Banana Hives     Itching and swelling     Crab (Diagnostic) Hives    Cleocin [Clindamycin]      Causes C Diff    Latex      Pt told not to be in contact with latex because of banana allergy    Penicillins Other (See Comments)     Causes vaginal itching and must take Diflucan with it          Objective     /71   Pulse 92   Ht 5' 4" (1 626 m)   Wt 105 kg (232 lb)   BMI 39 82 kg/m²      PE:  AAOx 3  WDWN  Hearing intact, no drainage from eyes  no audible wheezing  no abdominal distension  LE compartments soft, AT/GS intact    Ortho Exam:  right Knee:   INC: C/D/I, No erythema, mild swelling  AROM:0-120

## 2018-10-17 NOTE — LETTER
October 17, 2018     Patient: Marcelino Cardenas   YOB: 1960   Date of Visit: 10/17/2018       To Whom it May Concern:    Marcelino Cardenas is under my professional care  She was seen in my office on 10/17/2018  She may return to work on 10/18/18 to full duty with no restrictions   If you have any questions or concerns, please don't hesitate to call           Sincerely,          Attila Talbot PA-c       CC: WINSOME Noyola

## 2018-10-23 ENCOUNTER — OFFICE VISIT (OUTPATIENT)
Dept: PHYSICAL THERAPY | Facility: CLINIC | Age: 58
End: 2018-10-23
Payer: COMMERCIAL

## 2018-10-23 DIAGNOSIS — Z98.890 S/P RIGHT KNEE ARTHROSCOPY: Primary | ICD-10-CM

## 2018-10-23 PROCEDURE — 97110 THERAPEUTIC EXERCISES: CPT

## 2018-10-23 PROCEDURE — 97010 HOT OR COLD PACKS THERAPY: CPT

## 2018-10-23 NOTE — PROGRESS NOTES
Daily Note     Today's date: 10/23/2018  Patient name: Cody Montoya  : 1960  MRN: [id number]  Referring provider: Rebeca Son MD  Dx:   Encounter Diagnosis     ICD-10-CM    1  S/P right knee arthroscopy Z98 890          Re-eval Date: 18    Date 10 23 18       Visit Count 6       FOTO        Pain In 3-5/10       Pain Out 3/10                 Subjective:  Pt  States she's had pain and swelling @ R knee since , of unknown origin  Objective: See treatment diary below      Assessment: Tolerated treatment fair  Patient struggled through completing mat table exercises  Held on all standing exercises/activities due to current symptoms  Plan: Pt  plans on RTW tonight  Was advised to use CP applic prior to work tonight  Also recommending to contact Ortho MD if symptoms persist or get intolerably worse  Con't services as per POC    Precautions: none    Daily Treatment Diary     Manual  10 23 18                           Exercise Diary  10 23 18       QS 25 x 5"         Heel slides 30x         SAQs 30x         SLRs 2 x 10         Sup Abd/add 30x         LAQs 30x         Seated march 30x       Ham stretch 5x20"         Nustep L1  10 min         Standing march, Abd, ext Held this visit       Leg press Held this visit       Leg ext mach Held this visit         Leg curl mach        Calf stretch on incline With sheet on roll  5 x 20sec       Mini squats Held this visit         Step ups Held this visit                                           home 10 23 18       CP to R knee 15 min to R Knee                         Pt given written and illustrated HEP and instr in same at last visit    Pt demonstrated good understanding of same

## 2018-10-25 ENCOUNTER — OFFICE VISIT (OUTPATIENT)
Dept: PHYSICAL THERAPY | Facility: CLINIC | Age: 58
End: 2018-10-25
Payer: COMMERCIAL

## 2018-10-25 DIAGNOSIS — Z98.890 S/P RIGHT KNEE ARTHROSCOPY: Primary | ICD-10-CM

## 2018-10-25 PROCEDURE — 97010 HOT OR COLD PACKS THERAPY: CPT

## 2018-10-25 PROCEDURE — 97110 THERAPEUTIC EXERCISES: CPT

## 2018-10-25 NOTE — PROGRESS NOTES
Daily Note     Today's date: 10/25/2018  Patient name: Erik Arevalo  : 1960  MRN: [id number]  Referring provider: Mino Bragg MD  Dx:   Encounter Diagnosis     ICD-10-CM    1  S/P right knee arthroscopy Z98 890          Re-eval Date: 18    Date 10 23 18 10 25 18      Visit Count 6 7      FOTO        Pain In 3-5/10 0      Pain Out 3/10 1-2/10                Subjective: Pt  Still experiencing swelling and Pain @ R Knee  Says she did "Ok" at work last night, but is now limping today  Objective: See treatment diary below      Assessment: Tolerated treatment fair  Patient would benefit from continued PT      Plan: Plan to re-introduce standing exercises upon next treatment visi if pt s symptoms have improved  Con't  Services 2x/week  Precautions: none    Daily Treatment Diary     Manual  10 23 18 10 25 18                          Exercise Diary  10 23 18 10 25 18      QS 25x 5"   25x 5"      Heel slides 30x   30x      SAQs 30x   30x      SLRs 2 x 10   2 x 10      Sup Abd/add 30x   30x      LAQs 30x   40x      Seated march 30x 40x      Ham stretch 5x20"   5x20"      Nustep L1  10 min   L1  10 min      Standing march, Abd, ext Held this visit Held this visit      Leg press Held this visit   Held this visit        Leg ext mach          Leg curl mach        Calf stretch on incline With sheet on roll  5 x 20sec With sheet on roll  5 x 20sec      Mini squats Held this visit   Held this visit      Step ups Held this visit Held this visit                                            home 10 23 18 10 25 18      CP to R knee 15 min to R Knee 15 min to R Knee                        Pt given written and illustrated HEP and instr in same at last visit    Pt demonstrated good understanding of same

## 2018-10-29 ENCOUNTER — OFFICE VISIT (OUTPATIENT)
Dept: PHYSICAL THERAPY | Facility: CLINIC | Age: 58
End: 2018-10-29
Payer: COMMERCIAL

## 2018-10-29 DIAGNOSIS — Z98.890 S/P RIGHT KNEE ARTHROSCOPY: Primary | ICD-10-CM

## 2018-10-29 PROCEDURE — 97110 THERAPEUTIC EXERCISES: CPT

## 2018-10-29 PROCEDURE — 97010 HOT OR COLD PACKS THERAPY: CPT

## 2018-10-29 NOTE — PROGRESS NOTES
Daily Note     Today's date: 10/29/2018  Patient name: Usman Ronquillo  : 1960  MRN: [id number]  Referring provider: Colette Ordaz MD  Dx:   Encounter Diagnosis     ICD-10-CM    1  S/P right knee arthroscopy Z98 890                   Subjective: Pt notes feeling better today after going through a rough couple days with increased edema  Objective:          Precautions: none     Daily Treatment Diary      Manual  10 23 18 10 25 18  10/29                                         Exercise Diary  10 23 18 10 25 18  10/29       QS 25x 5"    25x 5"  25x5"       Heel slides 30x    30x  40x       SAQs 30x    30x  30x       SLRs 2 x 10    2 x 10  3x10       Sup Abd/add 30x    30x  40x       LAQs 30x    40x  40x       Seated march 30x 40x  40x       Ham stretch 5x20"    5x20"  5x20"       Nustep L1  10 min    L1  10 min  L2 10'       Standing march, Abd, ext Held this visit Held this visit  NV       Leg press Held this visit    Held this visit     NV       Leg ext mach                Leg curl mach             Calf stretch on incline With sheet on roll  5 x 20sec With sheet on roll  5 x 20sec  with sheet on roll 5x20'       Mini squats Held this visit    Held this visit  NV       Step ups Held this visit Held this visit     NV        Single leg stance      3x15"        HR/TR      2x10                                         home 10 23 18 10 25 18  10/29/18       CP to R knee 15 min to R Knee 15 min to R Knee  15min to r KNEE                                      Pt given written and illustrated HEP and instr in same at last visit    Pt demonstrated good understanding of same                Assessment: Pt responded well today  Increased progresses with no increase in symptoms  Plan: Continue with current program while monitoring symptoms

## 2018-11-01 ENCOUNTER — OFFICE VISIT (OUTPATIENT)
Dept: PHYSICAL THERAPY | Facility: CLINIC | Age: 58
End: 2018-11-01
Payer: COMMERCIAL

## 2018-11-01 DIAGNOSIS — Z98.890 S/P RIGHT KNEE ARTHROSCOPY: Primary | ICD-10-CM

## 2018-11-01 PROCEDURE — 97010 HOT OR COLD PACKS THERAPY: CPT

## 2018-11-01 PROCEDURE — 97110 THERAPEUTIC EXERCISES: CPT

## 2018-11-01 NOTE — PROGRESS NOTES
Daily Note     Today's date: 2018  Patient name: Katheryn Jackson  : 1960  MRN: [id number]  Referring provider: Ronit Simon MD  Dx:   Encounter Diagnosis     ICD-10-CM    1  S/P right knee arthroscopy Z98 890          Re-eval Date: 18    Date 10 23 18 10 25 18 10/29 11 1 18    Visit Count 6 7  9    FOTO        Pain In 3-5/10 0  1/10    Pain Out 3/10 1-2/10  0-1/10              Subjective:  Pt  States her R Knee felt really good this past Tuesday, however yesterday and today she has some soreness and Pain again  Rates level of pain to = "1"    Objective: See treatment diary below      Assessment: Tolerated treatment fairly well    Patient with no exacerbation of symptoms with performance of exercise program       Plan: Con't  services as per POC/Goals        Precautions: none     Daily Treatment Diary      Manual  10 23 18 10 25 18  10/29  11  1 18                                       Exercise Diary  10 23 18 10 25 18  10/29  11  1 18     QS 25x 5"    25x 5"  25x5"  25x5"     Heel slides 30x    30x  40x  40x     SAQs 30x    30x  30x  40x     SLRs 2 x 10    2 x 10  3x10  3x10     Sup Abd/add 30x    30x  40x  40x     LAQs 30x    40x  40x  45x     Seated march 30x 40x  40x  45x       Ham stretch 5x20"    5x20"  5x20"  5x20"     Nustep L1  10 min    L1  10 min  L2 10'  L2 10'     Standing march, Abd, ext Held this visit Held this visit  NV  R 1x30 ea     Leg press Held this visit    Held this visit            Leg ext mach                Leg curl mach               Calf stretch on incline With sheet on roll  5 x 20sec With sheet on roll  5 x 20sec  with sheet on roll 5x20'  with sheet on roll 5x20'     Mini squats Held this visit    Held this visit    Con't NV     Step ups Held this visit Held this visit       Con't NV      Single leg stance      3x15"      ----         HR/TR      2x10    seated 45x   Stand  30x                                       home 10 23 18 10 25 18  10/29/18  11  1 18     CP to R knee 15 min to R Knee 15 min to R Knee  15min to r KNEE  15 min to R   Knee                                    Pt given written and illustrated HEP and instr in same at last visit    Pt demonstrated good understanding of same

## 2018-11-05 ENCOUNTER — TRANSCRIBE ORDERS (OUTPATIENT)
Dept: LAB | Facility: CLINIC | Age: 58
End: 2018-11-05

## 2018-11-05 ENCOUNTER — OFFICE VISIT (OUTPATIENT)
Dept: PHYSICAL THERAPY | Facility: CLINIC | Age: 58
End: 2018-11-05
Payer: COMMERCIAL

## 2018-11-05 DIAGNOSIS — F33.1 MAJOR DEPRESSIVE DISORDER, RECURRENT EPISODE, MODERATE (HCC): ICD-10-CM

## 2018-11-05 DIAGNOSIS — Z98.890 S/P RIGHT KNEE ARTHROSCOPY: Primary | ICD-10-CM

## 2018-11-05 DIAGNOSIS — F41.1 GENERALIZED ANXIETY DISORDER: ICD-10-CM

## 2018-11-05 DIAGNOSIS — F31.31 MILD DEPRESSED BIPOLAR I DISORDER (HCC): Primary | ICD-10-CM

## 2018-11-05 PROCEDURE — 97110 THERAPEUTIC EXERCISES: CPT

## 2018-11-05 PROCEDURE — 97010 HOT OR COLD PACKS THERAPY: CPT

## 2018-11-05 NOTE — PROGRESS NOTES
Daily Note     Today's date: 2018  Patient name: Martha Olivares  : 1960  MRN: [id number]  Referring provider: Mague Coley MD  Dx:   Encounter Diagnosis     ICD-10-CM    1  S/P right knee arthroscopy Z98 890          Re-eval Date: 18    Date 10 23 18 10 25 18 10/29 11 1 18 11 5 18   Visit Count 6 7  9 10   FOTO        Next MD visit        Pain In 3-5/10 0  1/10 1/10   Pain Out 3/10 1-2/10  0-1/10 0/10             Subjective:  "No better, no worse"      Objective: See treatment diary below      Assessment: Tolerated treatment Fairly well    Patient tolerated re-introduced exercises, as well  Plan: Continue per plan of care         Precautions: none     Daily Treatment Diary      Manual  10 23 18 10 25 18  10/29  11  1 18  11 5 18                                     Exercise Diary  10 23 18 10  18  10/29  11  1 18  11 5 18   QS 25x 5"    25x 5"  25x5"  25x5"  30x 5sec   Heel slides 30x    30x  40x  40x  45x   SAQs 30x    30x  30x  40x  40x   SLRs 2 x 10    2 x 10  3x10  3x10  3x10   Sup Abd/add 30x    30x  40x  40x  45x   LAQs 30x    40x  40x  45x  HEP   Seated march 30x 40x  40x  45x    HEP   Ham stretch 5x20"    5x20"  5x20"  R 1x30 ea  R 1x30 ea   Nustep L1  10 min    L1  10 min  L2 10'  L2 10'  *TRUE  Fitness Bike  L2 x 10min   Standing march, Abd, ext Held this visit Held this visit  NV  R 1x30 ea  R 1x30 ea   Leg press Held this visit    Held this visit         30# 30x   Leg ext mach             **  Ext  0# 30x     Leg curl mach            **  Curl 0# 30x   Calf stretch on incline With sheet on roll  5 x 20sec With sheet on roll  5 x 20sec  with sheet on roll 5x20'  with sheet on roll 5x20'  R [id number]    Mini squats Held this visit    Held this visit    Con't NV  1x20   Step ups Held this visit Held this visit       Con't NV up R,   down L    Fwd 1x15  Lat 15x      Single leg stance      3x15"      ----         HR/TR      2x10    seated 45x   Stand  30x  Stand  30x                                   home 10 23 18 10 25 18  10/29/18  11  1 18  11 5 18   CP to R knee 15 min to R Knee 15 min to R Knee  15min to r KNEE  15 min to R   Knee  15 min to R   Knee    Moist Heat to R Quad          10 min to R   Knee                    Pt given written and illustrated HEP and instr in same at last visit    Pt demonstrated good understanding of same

## 2018-11-06 ENCOUNTER — APPOINTMENT (OUTPATIENT)
Dept: LAB | Facility: CLINIC | Age: 58
End: 2018-11-06
Payer: COMMERCIAL

## 2018-11-06 DIAGNOSIS — F31.31 MILD DEPRESSED BIPOLAR I DISORDER (HCC): ICD-10-CM

## 2018-11-06 DIAGNOSIS — F41.1 GENERALIZED ANXIETY DISORDER: ICD-10-CM

## 2018-11-06 DIAGNOSIS — F33.1 MAJOR DEPRESSIVE DISORDER, RECURRENT EPISODE, MODERATE (HCC): ICD-10-CM

## 2018-11-06 LAB
ALBUMIN SERPL BCP-MCNC: 3.8 G/DL (ref 3.5–5)
ALP SERPL-CCNC: 96 U/L (ref 46–116)
ALT SERPL W P-5'-P-CCNC: 28 U/L (ref 12–78)
ANION GAP SERPL CALCULATED.3IONS-SCNC: 6 MMOL/L (ref 4–13)
AST SERPL W P-5'-P-CCNC: 18 U/L (ref 5–45)
BILIRUB SERPL-MCNC: 0.7 MG/DL (ref 0.2–1)
BUN SERPL-MCNC: 11 MG/DL (ref 5–25)
CALCIUM SERPL-MCNC: 9.8 MG/DL (ref 8.3–10.1)
CHLORIDE SERPL-SCNC: 108 MMOL/L (ref 100–108)
CHOLEST SERPL-MCNC: 238 MG/DL (ref 50–200)
CO2 SERPL-SCNC: 28 MMOL/L (ref 21–32)
CREAT SERPL-MCNC: 0.95 MG/DL (ref 0.6–1.3)
GFR SERPL CREATININE-BSD FRML MDRD: 66 ML/MIN/1.73SQ M
GLUCOSE P FAST SERPL-MCNC: 101 MG/DL (ref 65–99)
HDLC SERPL-MCNC: 35 MG/DL (ref 40–60)
LDLC SERPL CALC-MCNC: 149 MG/DL (ref 0–100)
LITHIUM SERPL-SCNC: 0.6 MMOL/L (ref 0.5–1)
NONHDLC SERPL-MCNC: 203 MG/DL
POTASSIUM SERPL-SCNC: 3.9 MMOL/L (ref 3.5–5.3)
PROT SERPL-MCNC: 7.8 G/DL (ref 6.4–8.2)
SODIUM SERPL-SCNC: 142 MMOL/L (ref 136–145)
TRIGL SERPL-MCNC: 272 MG/DL

## 2018-11-06 PROCEDURE — 36415 COLL VENOUS BLD VENIPUNCTURE: CPT

## 2018-11-06 PROCEDURE — 80178 ASSAY OF LITHIUM: CPT

## 2018-11-06 PROCEDURE — 80053 COMPREHEN METABOLIC PANEL: CPT

## 2018-11-06 PROCEDURE — 80061 LIPID PANEL: CPT

## 2018-11-08 ENCOUNTER — APPOINTMENT (OUTPATIENT)
Dept: PHYSICAL THERAPY | Facility: CLINIC | Age: 58
End: 2018-11-08
Payer: COMMERCIAL

## 2018-11-12 ENCOUNTER — OFFICE VISIT (OUTPATIENT)
Dept: PHYSICAL THERAPY | Facility: CLINIC | Age: 58
End: 2018-11-12
Payer: COMMERCIAL

## 2018-11-12 DIAGNOSIS — Z98.890 S/P RIGHT KNEE ARTHROSCOPY: Primary | ICD-10-CM

## 2018-11-12 PROCEDURE — 97110 THERAPEUTIC EXERCISES: CPT

## 2018-11-12 PROCEDURE — 97010 HOT OR COLD PACKS THERAPY: CPT

## 2018-11-12 NOTE — PROGRESS NOTES
Daily Note     Today's date: 2018  Patient name: Sanchez Nick  : 1960  MRN: [id number]  Referring provider: Colette Ordaz MD  Dx:   Encounter Diagnosis     ICD-10-CM    1  S/P right knee arthroscopy Z98 890             Re-eval Date: 18    Date 18       Visit Count 11       FOTO        Next MD visit        Pain In 1/10 "Just Soreness/  Stiffness"       Pain Out 0/10              Subjective:  Pt  States "the pain is gone, but I still have stiffness", re: R knee  Objective: See treatment diary below      Assessment: Tolerated treatment well  Patient exhibited good technique with therapeutic exercises      Plan: Continue per plan of care         Precautions: none     Daily Treatment Diary      Manual  18                                 Exercise Diary   18       QS 35x 5"          Heel slides 40x          SAQs 40x 5sec          SLRs 3 x 10          Sup Abd/add *Sidelying on L  3 x 10 R          LAQs 45x          Seated march 45x*         Ham stretch 6x20"          Nustep L3  12 min          Standing march, Abd, ext 3x15ea       Leg press 50#  30x          Leg ext mach  11#  20x          Leg curl mach  11#  20x         Calf stretch on incline 5 x 20sec         Mini squats 20x          Step ups Up R, Down L  30x   Fwd  *Lat       Single leg stance          HR/TR  Stand  40x                                   home 11 12 18       CP to R knee 15 min           Moist Heat to R Quad 15 min                    Pt given written and illustrated HEP and instr in same at last visit    Pt demonstrated good understanding of same

## 2018-11-14 ENCOUNTER — OFFICE VISIT (OUTPATIENT)
Dept: PHYSICAL THERAPY | Facility: CLINIC | Age: 58
End: 2018-11-14
Payer: COMMERCIAL

## 2018-11-14 DIAGNOSIS — Z98.890 S/P RIGHT KNEE ARTHROSCOPY: Primary | ICD-10-CM

## 2018-11-14 PROCEDURE — 97010 HOT OR COLD PACKS THERAPY: CPT

## 2018-11-14 PROCEDURE — 97110 THERAPEUTIC EXERCISES: CPT

## 2018-11-14 NOTE — PROGRESS NOTES
Daily Note     Today's date: 2018  Patient name: Cat Vizcarra  : 1960  MRN: [id number]  Referring provider: Ellie Fisher MD  Dx:   Encounter Diagnosis     ICD-10-CM    1  S/P right knee arthroscopy Z98 890      Date  14 18      Visit Count 11 12      FOTO        Next MD visit       Pain In 10 "Just Soreness/  Stiffness" 0 Pain  "Just a bit of Stiffness"      Pain Out 0/10 0                   Subjective:  Pt  States her R knee is so much better  "0" pain, but just a little stiffness  Also noted is increased ROM  Objective: See treatment diary below      Assessment: Tolerated treatment well  Patient exhibited good technique with therapeutic exercises      Plan: Continue per plan of care         Precautions: none     Daily Treatment Diary      Manual                                  Exercise Diary   14 18      QS 35x 5"    35x 5"      Heel slides 40x    45x      SAQs 40x 5sec    40x 5sec      SLRs 3 x 10    3 x 10      Sup Abd/add *Sidelying on L  3 x 10 R    Sidelying on L  3 x 10 R      LAQs 45x    HEP      Seated march 45x*   HEP      Ham stretch 6x20"    6x20"      Nustep L3  12 min    L3  12 min      Standing march, Abd, ext 3x15ea 3x15ea      Leg press 50#  30x    50#  40x      Leg ext mach  11#  20x    11#  25x      Leg curl mach  11#  20x   11#  30x      Calf stretch on incline 5 x 20sec   6 x 20sec      Mini squats 20x    20x      Step ups Up R, Down L  30x   Fwd 30x  *Lat 30x       Single leg stance          HR/TR  Stand  40x   Stand  45x                                  home 18 11 14 18      CP to R knee 15 min    10 min       Moist Heat to R Quad 15 min 10 min                   Pt given written and illustrated HEP and instr in same at last visit    Pt demonstrated good understanding of same

## 2019-04-22 ENCOUNTER — TRANSCRIBE ORDERS (OUTPATIENT)
Dept: LAB | Facility: CLINIC | Age: 59
End: 2019-04-22

## 2019-04-22 ENCOUNTER — APPOINTMENT (OUTPATIENT)
Dept: LAB | Facility: CLINIC | Age: 59
End: 2019-04-22
Payer: COMMERCIAL

## 2019-04-22 DIAGNOSIS — F39 MOOD DISORDER (HCC): ICD-10-CM

## 2019-04-22 DIAGNOSIS — F39 MOOD DISORDER (HCC): Primary | ICD-10-CM

## 2019-04-22 LAB — LITHIUM SERPL-SCNC: 0.6 MMOL/L (ref 0.5–1)

## 2019-04-22 PROCEDURE — 36415 COLL VENOUS BLD VENIPUNCTURE: CPT

## 2019-04-22 PROCEDURE — 80178 ASSAY OF LITHIUM: CPT

## 2019-05-07 ENCOUNTER — OFFICE VISIT (OUTPATIENT)
Dept: PSYCHIATRY | Facility: CLINIC | Age: 59
End: 2019-05-07
Payer: COMMERCIAL

## 2019-05-07 DIAGNOSIS — F31.9 BIPOLAR I DISORDER WITH DEPRESSION (HCC): Primary | ICD-10-CM

## 2019-05-07 PROCEDURE — 99214 OFFICE O/P EST MOD 30 MIN: CPT | Performed by: NURSE PRACTITIONER

## 2019-05-07 RX ORDER — CLONAZEPAM 0.5 MG/1
0.5 TABLET ORAL 3 TIMES DAILY
Qty: 270 TABLET | Refills: 1 | Status: SHIPPED | OUTPATIENT
Start: 2019-05-07 | End: 2019-06-11

## 2019-05-07 RX ORDER — BUPROPION HYDROCHLORIDE 100 MG/1
100 TABLET, EXTENDED RELEASE ORAL DAILY
Qty: 30 TABLET | Refills: 2 | Status: SHIPPED | OUTPATIENT
Start: 2019-05-07 | End: 2019-06-11

## 2019-05-07 RX ORDER — VENLAFAXINE HYDROCHLORIDE 150 MG/1
150 CAPSULE, EXTENDED RELEASE ORAL DAILY
Qty: 90 CAPSULE | Refills: 2 | Status: SHIPPED | OUTPATIENT
Start: 2019-05-07 | End: 2019-06-11 | Stop reason: SDUPTHER

## 2019-05-07 RX ORDER — LITHIUM CARBONATE 300 MG/1
300 CAPSULE ORAL 2 TIMES DAILY
Qty: 60 CAPSULE | Refills: 0
Start: 2019-05-07 | End: 2019-06-11

## 2019-05-17 ENCOUNTER — TELEPHONE (OUTPATIENT)
Dept: PSYCHIATRY | Facility: CLINIC | Age: 59
End: 2019-05-17

## 2019-06-11 ENCOUNTER — OFFICE VISIT (OUTPATIENT)
Dept: PSYCHIATRY | Facility: CLINIC | Age: 59
End: 2019-06-11
Payer: COMMERCIAL

## 2019-06-11 DIAGNOSIS — F31.9 BIPOLAR I DISORDER WITH DEPRESSION (HCC): Primary | ICD-10-CM

## 2019-06-11 PROCEDURE — 99214 OFFICE O/P EST MOD 30 MIN: CPT | Performed by: NURSE PRACTITIONER

## 2019-06-11 RX ORDER — CLONAZEPAM 0.5 MG/1
0.5 TABLET ORAL 3 TIMES DAILY
Qty: 270 TABLET | Refills: 1 | Status: SHIPPED | OUTPATIENT
Start: 2019-06-11 | End: 2019-06-11 | Stop reason: SDUPTHER

## 2019-06-11 RX ORDER — CLONAZEPAM 0.5 MG/1
0.5 TABLET ORAL 3 TIMES DAILY
Qty: 270 TABLET | Refills: 1 | Status: SHIPPED | OUTPATIENT
Start: 2019-06-11 | End: 2019-09-04

## 2019-06-11 RX ORDER — BUPROPION HYDROCHLORIDE 150 MG/1
150 TABLET, EXTENDED RELEASE ORAL DAILY
Qty: 90 TABLET | Refills: 2 | Status: SHIPPED | OUTPATIENT
Start: 2019-06-11 | End: 2019-07-16

## 2019-06-11 RX ORDER — LITHIUM CARBONATE 300 MG/1
300 CAPSULE ORAL 2 TIMES DAILY
Qty: 180 CAPSULE | Refills: 1 | Status: SHIPPED | OUTPATIENT
Start: 2019-06-11 | End: 2019-09-04

## 2019-06-11 RX ORDER — VENLAFAXINE HYDROCHLORIDE 150 MG/1
150 CAPSULE, EXTENDED RELEASE ORAL DAILY
Qty: 90 CAPSULE | Refills: 2 | Status: SHIPPED | OUTPATIENT
Start: 2019-06-11 | End: 2019-07-09 | Stop reason: ALTCHOICE

## 2019-06-11 RX ORDER — BUPROPION HYDROCHLORIDE 150 MG/1
150 TABLET, EXTENDED RELEASE ORAL DAILY
Qty: 30 TABLET | Refills: 2 | Status: SHIPPED | OUTPATIENT
Start: 2019-06-11 | End: 2019-06-11

## 2019-06-11 RX ORDER — TRAZODONE HYDROCHLORIDE 50 MG/1
200 TABLET ORAL
Qty: 90 TABLET | Refills: 1 | Status: SHIPPED | OUTPATIENT
Start: 2019-06-11 | End: 2019-09-04

## 2019-06-11 RX ORDER — ARIPIPRAZOLE 2 MG/1
2 TABLET ORAL DAILY
Qty: 30 TABLET | Refills: 2 | Status: SHIPPED | OUTPATIENT
Start: 2019-06-11 | End: 2019-07-09 | Stop reason: ALTCHOICE

## 2019-07-02 ENCOUNTER — TELEPHONE (OUTPATIENT)
Dept: PSYCHIATRY | Facility: CLINIC | Age: 59
End: 2019-07-02

## 2019-07-02 DIAGNOSIS — G25.71 AKATHISIA: Primary | ICD-10-CM

## 2019-07-02 RX ORDER — BENZTROPINE MESYLATE 0.5 MG/1
0.5 TABLET ORAL 2 TIMES DAILY
Qty: 60 TABLET | Refills: 5 | Status: SHIPPED | OUTPATIENT
Start: 2019-07-02 | End: 2019-07-09 | Stop reason: ALTCHOICE

## 2019-07-09 ENCOUNTER — TELEPHONE (OUTPATIENT)
Dept: PSYCHIATRY | Facility: CLINIC | Age: 59
End: 2019-07-09

## 2019-07-09 DIAGNOSIS — F33.1 MAJOR DEPRESSIVE DISORDER, RECURRENT, MODERATE (HCC): Primary | ICD-10-CM

## 2019-07-09 RX ORDER — VENLAFAXINE HYDROCHLORIDE 75 MG/1
75 CAPSULE, EXTENDED RELEASE ORAL DAILY
Qty: 30 CAPSULE | Refills: 0 | Status: SHIPPED | OUTPATIENT
Start: 2019-07-09 | End: 2019-09-04 | Stop reason: SDUPTHER

## 2019-07-09 NOTE — TELEPHONE ENCOUNTER
Mihaela Gibson called said she is having memory problems and doesn't know which medication is causing it, patient would like a call back        Upcoming appt 7/16/19      Contact# [phone]

## 2019-07-11 ENCOUNTER — APPOINTMENT (OUTPATIENT)
Dept: LAB | Facility: HOSPITAL | Age: 59
End: 2019-07-11
Payer: COMMERCIAL

## 2019-07-11 ENCOUNTER — TRANSCRIBE ORDERS (OUTPATIENT)
Dept: LAB | Facility: HOSPITAL | Age: 59
End: 2019-07-11

## 2019-07-11 DIAGNOSIS — E78.5 HYPERLIPIDEMIA, UNSPECIFIED HYPERLIPIDEMIA TYPE: ICD-10-CM

## 2019-07-11 DIAGNOSIS — E78.5 HYPERLIPIDEMIA, UNSPECIFIED HYPERLIPIDEMIA TYPE: Primary | ICD-10-CM

## 2019-07-11 LAB
ALBUMIN SERPL BCP-MCNC: 4.3 G/DL (ref 3.5–5.7)
ALP SERPL-CCNC: 58 U/L (ref 55–165)
ALT SERPL W P-5'-P-CCNC: 26 U/L (ref 7–52)
ANION GAP SERPL CALCULATED.3IONS-SCNC: 7 MMOL/L (ref 4–13)
AST SERPL W P-5'-P-CCNC: 22 U/L (ref 13–39)
BASOPHILS # BLD AUTO: 0.1 THOUSANDS/ΜL (ref 0–0.1)
BASOPHILS NFR BLD AUTO: 1 % (ref 0–2)
BILIRUB SERPL-MCNC: 0.8 MG/DL (ref 0.2–1)
BUN SERPL-MCNC: 10 MG/DL (ref 7–25)
CALCIUM ALBUM COR SERPL-MCNC: 10.1 MG/DL (ref 8.3–10.1)
CALCIUM SERPL-MCNC: 10.3 MG/DL (ref 8.6–10.5)
CHLORIDE SERPL-SCNC: 106 MMOL/L (ref 98–107)
CHOLEST SERPL-MCNC: 117 MG/DL (ref 0–200)
CO2 SERPL-SCNC: 26 MMOL/L (ref 21–31)
CREAT SERPL-MCNC: 0.91 MG/DL (ref 0.6–1.2)
EOSINOPHIL # BLD AUTO: 0.6 THOUSAND/ΜL (ref 0–0.61)
EOSINOPHIL NFR BLD AUTO: 8 % (ref 0–5)
ERYTHROCYTE [DISTWIDTH] IN BLOOD BY AUTOMATED COUNT: 13.3 % (ref 11.5–14.5)
GFR SERPL CREATININE-BSD FRML MDRD: 69 ML/MIN/1.73SQ M
GLUCOSE SERPL-MCNC: 117 MG/DL (ref 65–99)
HCT VFR BLD AUTO: 42 % (ref 42–47)
HDLC SERPL-MCNC: 38 MG/DL (ref 40–60)
HGB BLD-MCNC: 14.4 G/DL (ref 12–16)
LDLC SERPL CALC-MCNC: 53 MG/DL (ref 0–100)
LYMPHOCYTES # BLD AUTO: 1.7 THOUSANDS/ΜL (ref 0.6–4.47)
LYMPHOCYTES NFR BLD AUTO: 24 % (ref 21–51)
MCH RBC QN AUTO: 29.3 PG (ref 26–34)
MCHC RBC AUTO-ENTMCNC: 34.4 G/DL (ref 31–37)
MCV RBC AUTO: 85 FL (ref 81–99)
MONOCYTES # BLD AUTO: 0.6 THOUSAND/ΜL (ref 0.17–1.22)
MONOCYTES NFR BLD AUTO: 8 % (ref 2–12)
NEUTROPHILS # BLD AUTO: 4.2 THOUSANDS/ΜL (ref 1.4–6.5)
NEUTS SEG NFR BLD AUTO: 59 % (ref 42–75)
NONHDLC SERPL-MCNC: 79 MG/DL
PLATELET # BLD AUTO: 189 THOUSANDS/UL (ref 149–390)
PMV BLD AUTO: 10.4 FL (ref 8.6–11.7)
POTASSIUM SERPL-SCNC: 3.8 MMOL/L (ref 3.5–5.5)
PROT SERPL-MCNC: 7.3 G/DL (ref 6.4–8.9)
RBC # BLD AUTO: 4.92 MILLION/UL (ref 3.9–5.2)
SODIUM SERPL-SCNC: 139 MMOL/L (ref 134–143)
TRIGL SERPL-MCNC: 130 MG/DL (ref 44–166)
WBC # BLD AUTO: 7 THOUSAND/UL (ref 4.8–10.8)

## 2019-07-11 PROCEDURE — 80061 LIPID PANEL: CPT

## 2019-07-11 PROCEDURE — 85025 COMPLETE CBC W/AUTO DIFF WBC: CPT

## 2019-07-11 PROCEDURE — 36415 COLL VENOUS BLD VENIPUNCTURE: CPT

## 2019-07-11 PROCEDURE — 80053 COMPREHEN METABOLIC PANEL: CPT

## 2019-07-16 ENCOUNTER — OFFICE VISIT (OUTPATIENT)
Dept: PSYCHIATRY | Facility: CLINIC | Age: 59
End: 2019-07-16
Payer: COMMERCIAL

## 2019-07-16 DIAGNOSIS — F31.9 BIPOLAR I DISORDER WITH DEPRESSION (HCC): ICD-10-CM

## 2019-07-16 DIAGNOSIS — F41.1 GENERALIZED ANXIETY DISORDER: Primary | ICD-10-CM

## 2019-07-16 PROCEDURE — 99214 OFFICE O/P EST MOD 30 MIN: CPT | Performed by: NURSE PRACTITIONER

## 2019-07-16 RX ORDER — BUPROPION HYDROCHLORIDE 100 MG/1
100 TABLET, EXTENDED RELEASE ORAL DAILY
Qty: 30 TABLET | Refills: 1 | Status: SHIPPED | OUTPATIENT
Start: 2019-07-16 | End: 2019-09-04 | Stop reason: ALTCHOICE

## 2019-07-16 NOTE — PSYCH
PROGRESS NOTE        [facility]      Name and Date of Birth:  Medina Morrison 60 y o  1960    Date of Visit: 07/16/19    SUBJECTIVE:    Kirsten Lee presents today for treatment of major depressive disorder, bipolar depression, generalized anxiety disorder and mood disorder  She had a stop the Abilify because it was causing her such restlessness  We did add Cogentin to see if that would help but unfortunately, the Cogentin to and made her more confused and disorganized in her thought process  Also some of it could be contributed from the Wellbutrin  which we are now reducing to 100 today  Effexor will remain at 150 a day  She is understandably disappointed because the Abilify did help her feel less depressed and she had more motivation she had energy and she had interest and she was on the are doing things and now she is not  We discuss treatment options  She has been on multiple mood stabilizers in the past which had not been effective she had been on [place], she has been on Geodon, she has been on Trileptal and Lamictal all which have not worked  So we are going to  Trial Vraylar at 1 5 mg daily and monitor response  She is to give me a call next week  Also, she has been interested in therapy and I was able to secure an appointment for her at our [facility] office with family  We will schedule Maia after I had discussed how she is feeling when I speak with her next week  She denies suicidal ideation, intent or plan at present, has no suicidal ideation, intent or plan at present  She denies any auditory hallucinations and visual hallucinations, denies any other delusional thinking, denies any delusional thinking  She denies any side effects from medications      HPI ROS Appetite Changes and Sleep: normal appetite, normal sleep    Review Of Systems:      Constitutional Negative   ENT Negative   Cardiovascular Negative   Respiratory As Noted in HPI   Gastrointestinal Negative   Genitourinary Negative   Musculoskeletal Negative   Integumentary Negative   Neurological Negative   Endocrine Negative   Other Symptoms Negative and None       Laboratory Results: No results found for this or any previous visit      Substance Abuse History:    Social History     Substance and Sexual Activity   Drug Use No       Family Psychiatric History:     Family History   Problem Relation Age of Onset    No Known Problems Mother     No Known Problems Father        The following portions of the patient's history were reviewed and updated as appropriate: past family history, past medical history, past social history, past surgical history and problem list     Social History     Socioeconomic History    Marital status: /Civil Union     Spouse name: Not on file    Number of children: Not on file    Years of education: Not on file    Highest education level: Not on file   Occupational History    Not on file   Social Needs    Financial resource strain: Not on file    Food insecurity:     Worry: Not on file     Inability: Not on file    Transportation needs:     Medical: Not on file     Non-medical: Not on file   Tobacco Use    Smoking status: Never Smoker    Smokeless tobacco: Never Used   Substance and Sexual Activity    Alcohol use: Yes     Comment: 5 yearly    Drug use: No    Sexual activity: Not on file   Lifestyle    Physical activity:     Days per week: Not on file     Minutes per session: Not on file    Stress: Not on file   Relationships    Social connections:     Talks on phone: Not on file     Gets together: Not on file     Attends Yazidism service: Not on file     Active member of club or organization: Not on file     Attends meetings of clubs or organizations: Not on file     Relationship status: Not on file    Intimate partner violence:     Fear of current or ex partner: Not on file     Emotionally abused: Not on file     Physically abused: Not on file     Forced sexual activity: Not on file   Other Topics Concern    Not on file   Social History Narrative    Not on file     Social History     Social History Narrative    Not on file        Social History     Tobacco History     Smoking Status  Never Smoker    Smokeless Tobacco Use  Never Used          Alcohol History     Alcohol Use Status  Yes Comment  5 yearly          Drug Use     Drug Use Status  No          Sexual Activity     Sexually Active  Not Asked          Activities of Daily Living    Not Asked                     OBJECTIVE:     Mental Status Evaluation:    Appearance age appropriate, casually dressed   Behavior pleasant, cooperative   Speech normal volume, normal pitch   Mood Mild depression   Affect Dull   Thought Processes logical   Associations intact associations   Thought Content Normal   Perceptual Disturbances: None   Abnormal Thoughts  Risk Potential Suicidal ideation - None  Homicidal ideation - None  Potential for aggression - No   Orientation oriented to person, place, time/date and situation   Memory recent and remote memory grossly intact   Cosciousness alert and awake   Attention Span attention span and concentration are age appropriate   Intellect Appears to be of Average Intelligence   Insight Fair   Judgement Fair   Muscle Strength and  Gait muscle strength and tone were normal   Language no difficulty naming common objects   Fund of Knowledge displays adequate knowledge of current events   Pain None   Pain Scale 0       Assessment/Plan:       Diagnoses and all orders for this visit:    Generalized anxiety disorder    Bipolar I disorder with depression (HCC)  -     buPROPion (WELLBUTRIN SR) 100 mg 12 hr tablet; Take 1 tablet (100 mg total) by mouth daily  -     Discontinue: cariprazine (VRAYLAR) 1 5 MG capsule; Take 1 capsule (1 5 mg total) by mouth daily  -     cariprazine (VRAYLAR) 1 5 MG capsule;  Take 1 capsule (1 5 mg total) by mouth daily          Treatment Recommendations/Precautions:    Continue current medications:  Trial Vraylar 1 5mg Daily  Wellbutrin  mg daily  Discontinue Abilify and Cogentin  Lithium carbonate 300 mg b i d  Venlafaxine  daily  Reduce trazodone 150 mg HS  Xanax 0 5 mg b i d  As needed for anxiety    Risks/Benefits      Risks, Benefits And Possible Side Effects Of Medications:    Risks, benefits, and possible side effects of medications explained to patient and patient verbalizes understanding and agreement for treatment  Controlled Medication Discussion:  Takes all meds as prescribed there is no history of any overuse or abuse of medications      Not applicable    Psychotherapy Provided:     Individual psychotherapy provided: No

## 2019-07-25 ENCOUNTER — SOCIAL WORK (OUTPATIENT)
Dept: BEHAVIORAL/MENTAL HEALTH CLINIC | Facility: CLINIC | Age: 59
End: 2019-07-25
Payer: COMMERCIAL

## 2019-07-25 DIAGNOSIS — F32.2 CURRENT SEVERE EPISODE OF MAJOR DEPRESSIVE DISORDER WITHOUT PSYCHOTIC FEATURES, UNSPECIFIED WHETHER RECURRENT (HCC): ICD-10-CM

## 2019-07-25 DIAGNOSIS — F31.9 BIPOLAR 1 DISORDER, DEPRESSED (HCC): Primary | ICD-10-CM

## 2019-07-25 DIAGNOSIS — F41.9 ANXIETY: ICD-10-CM

## 2019-07-25 PROCEDURE — 90791 PSYCH DIAGNOSTIC EVALUATION: CPT | Performed by: SOCIAL WORKER
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
## 2019-07-25 NOTE — BH TREATMENT PLAN
Gustabo García  1960       Date of Initial Treatment Plan: 07/25/2019   Date of Current Treatment Plan: 07/25/19    Treatment Plan Number 1     Strengths/Personal Resources for Self Care: Spending time with family, grandchildren, spending time with dog  Diagnosis:   1  Bipolar 1 disorder, depressed ([facility] )     2  Anxiety     3  Current severe episode of major depressive disorder without psychotic features, unspecified whether recurrent ([facility] )         Area of Needs: Decrease depressive symptoms  Long Term Goal 1: ADecrease symptoms of Depressio, Increase motivation    Target Date: 10/26/2019  Completion Date: TBD         Short Term Objectives for Goal 1: AIdentify 5 healthy coping skills in the next month and utilize 3-4 x per day, B Identify 2 things that motivate patient 1 x per day, C  Identify emotions and discuss with supports 3-4 times per week  Long Term Goal 2: N/A    Target Date: N/A  Completion Date: N/A    Short Term Objectives for Goal 2: N/A         Long Term Goal # 3: N/A     Target Date: N/A  Completion Date: N/A    Short Term Objectives for Goal 3: N/A    GOAL 1: Modality: Individual 4x per month   Completion Date TBD    GOAL 1: Modality: The person(s) responsible for carrying out the plan is  Gilbert [place]: Diagnosis and Treatment Plan explained to Bon [place] relates understanding diagnosis and is agreeable to Treatment Plan  Client Comments : Please share your thoughts, feelings, need and/or experiences regarding your treatment plan: I agree with this plan

## 2019-07-30 ENCOUNTER — SOCIAL WORK (OUTPATIENT)
Dept: BEHAVIORAL/MENTAL HEALTH CLINIC | Facility: CLINIC | Age: 59
End: 2019-07-30
Payer: COMMERCIAL

## 2019-07-30 DIAGNOSIS — F31.76 BIPOLAR 1 DISORDER, DEPRESSED, FULL REMISSION (HCC): Primary | ICD-10-CM

## 2019-07-30 PROCEDURE — 99243 OFF/OP CNSLTJ NEW/EST LOW 30: CPT | Performed by: SOCIAL WORKER
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
## 2019-08-02 DIAGNOSIS — F31.9 BIPOLAR I DISORDER WITH DEPRESSION (HCC): ICD-10-CM

## 2019-08-05 ENCOUNTER — TELEPHONE (OUTPATIENT)
Dept: PSYCHIATRY | Facility: CLINIC | Age: 59
End: 2019-08-05

## 2019-08-05 DIAGNOSIS — F31.9 BIPOLAR I DISORDER WITH DEPRESSION (HCC): ICD-10-CM

## 2019-08-05 NOTE — TELEPHONE ENCOUNTER
Pharmacy called on 8/3 and stated RX dose was increased, but no new instructions were given  [facility], Mike Conner @ [phone]

## 2019-08-08 ENCOUNTER — APPOINTMENT (OUTPATIENT)
Dept: RADIOLOGY | Facility: MEDICAL CENTER | Age: 59
End: 2019-08-08
Payer: COMMERCIAL

## 2019-08-08 ENCOUNTER — OFFICE VISIT (OUTPATIENT)
Dept: OBGYN CLINIC | Facility: MEDICAL CENTER | Age: 59
End: 2019-08-08
Payer: COMMERCIAL

## 2019-08-08 VITALS
HEIGHT: 64 IN | HEART RATE: 78 BPM | WEIGHT: 245.5 LBS | BODY MASS INDEX: 41.91 KG/M2 | DIASTOLIC BLOOD PRESSURE: 77 MMHG | SYSTOLIC BLOOD PRESSURE: 117 MMHG

## 2019-08-08 DIAGNOSIS — M17.11 PRIMARY OSTEOARTHRITIS OF RIGHT KNEE: ICD-10-CM

## 2019-08-08 DIAGNOSIS — Z01.89 ENCOUNTER FOR LOWER EXTREMITY COMPARISON IMAGING STUDY: ICD-10-CM

## 2019-08-08 DIAGNOSIS — M25.561 RIGHT KNEE PAIN, UNSPECIFIED CHRONICITY: ICD-10-CM

## 2019-08-08 DIAGNOSIS — Z01.89 ENCOUNTER FOR LOWER EXTREMITY COMPARISON IMAGING STUDY: Primary | ICD-10-CM

## 2019-08-08 PROCEDURE — 73564 X-RAY EXAM KNEE 4 OR MORE: CPT

## 2019-08-08 PROCEDURE — 99213 OFFICE O/P EST LOW 20 MIN: CPT | Performed by: ORTHOPAEDIC SURGERY

## 2019-08-08 PROCEDURE — 73560 X-RAY EXAM OF KNEE 1 OR 2: CPT

## 2019-08-08 PROCEDURE — 20610 DRAIN/INJ JOINT/BURSA W/O US: CPT | Performed by: PHYSICIAN ASSISTANT

## 2019-08-08 RX ORDER — LIDOCAINE HYDROCHLORIDE 10 MG/ML
3 INJECTION, SOLUTION INFILTRATION; PERINEURAL
Status: COMPLETED | OUTPATIENT
Start: 2019-08-08 | End: 2019-08-08

## 2019-08-08 RX ORDER — METHYLPREDNISOLONE ACETATE 40 MG/ML
2 INJECTION, SUSPENSION INTRA-ARTICULAR; INTRALESIONAL; INTRAMUSCULAR; SOFT TISSUE
Status: COMPLETED | OUTPATIENT
Start: 2019-08-08 | End: 2019-08-08

## 2019-08-08 RX ADMIN — METHYLPREDNISOLONE ACETATE 2 ML: 40 INJECTION, SUSPENSION INTRA-ARTICULAR; INTRALESIONAL; INTRAMUSCULAR; SOFT TISSUE at 13:04

## 2019-08-08 RX ADMIN — LIDOCAINE HYDROCHLORIDE 3 ML: 10 INJECTION, SOLUTION INFILTRATION; PERINEURAL at 13:04

## 2019-08-08 NOTE — PROGRESS NOTES
Assessment/Plan:  1  Encounter for lower extremity comparison imaging study    2  Right knee pain, unspecified chronicity    3  Primary osteoarthritis of right knee      Orders Placed This Encounter   Procedures    XR knee 4+ vw right injury    XR knee 1 or 2 vw left   -patient received a right knee steroid injection today  She should avoid strenuous activities and rest and ice her knee for 1-2 days   -continue with ibuprofen and Tylenol as needed for pain control  She may take up to a 1000 mg of Tylenol 3 times a day in addition to the ibuprofen  -continue with the knee brace as needed for comfort  -continue with home exercises that were given to her at physical therapy in the past  -if she continues to have pain, she will call to order viscosupplementation for her right knee   -she may call for a Medrol Dosepak before her trip in October to [facility] if needed  Return if symptoms worsen or fail to improve  I answered all of the patient's questions during the visit and provided education of the patient's condition during the visit  The patient verbalized understanding of the information given and agrees with the plan  This note was dictated using [employer] software  It may contain errors including improperly dictated words  Please contact physician directly for any questions  Subjective   Chief Complaint:   Chief Complaint   Patient presents with    Right Knee - Follow-up       Gerson Thompson is a 60 y o  female who presents for follow up for right knee pain today  Patient had a right knee arthroscopy on 9/27/2018  She states her knee was feeling better until a few weeks ago  She had no falls traumas or injuries  [place] up her knee doing a lot of walking  Her knee pain is posterior medial   Her knee does feel unstable at times and the pain is worse with standing  She has been doing her home exercises from physical therapy    She does have a knee brace     Review of Systems  ROS:    See HPI for musculoskeletal review  All other systems reviewed are negative     History:  Past Medical History:   Diagnosis Date    Anxiety     Arthritis     Back pain     Lower back    Bipolar 1 disorder (HCC)     Depression     Diverticulosis     Hip dysplasia     Bilateral    Hyperlipidemia     Knee pain, right     Obesity     Psoriasis     Along hairline      Stress incontinence      Past Surgical History:   Procedure Laterality Date    COLONOSCOPY      DILATION AND CURETTAGE OF UTERUS      One D&C and one D&E    ESOPHAGOGASTRODUODENOSCOPY      NASAL SEPTUM SURGERY      MD KNEE SCOPE,MED/LAT MENISECTOMY Right 9/27/2018    Procedure: ARTHROSCOPY KNEE; PARTIAL MEDIAL MENISECTOMY; CHONDROPLASTY;  Surgeon: Arleen Parker DO;  Location: [facility];  Service: Orthopedics    TONSILECTOMY AND ADNOIDECTOMY      TONSILLECTOMY       Social History   Social History     Substance and Sexual Activity   Alcohol Use Yes    Comment: 5 yearly     Social History     Substance and Sexual Activity   Drug Use No     Social History     Tobacco Use   Smoking Status Never Smoker   Smokeless Tobacco Never Used     Family History:   Family History   Problem Relation Age of Onset    No Known Problems Mother     No Known Problems Father        Current Outpatient Medications on File Prior to Visit   Medication Sig Dispense Refill    acetaminophen (TYLENOL) 500 mg tablet Take 500 mg by mouth every 6 (six) hours as needed for mild pain      ALPRAZolam (XANAX) 0 5 mg tablet Take 0 5 mg by mouth 2 (two) times a day as needed for anxiety        aspirin (ECOTRIN) 325 mg EC tablet Take 1 tablet (325 mg total) by mouth 2 (two) times a day 28 tablet 0    buPROPion (WELLBUTRIN SR) 100 mg 12 hr tablet Take 1 tablet (100 mg total) by mouth daily 30 tablet 1    cariprazine (VRAYLAR) 3 MG capsule Take 1 capsule (3 mg total) by mouth daily 30 capsule 5    clonazePAM (KlonoPIN) 0 5 mg tablet Take 1 tablet (0 5 mg total) by mouth 3 (three) times a day 270 tablet 1    lithium carbonate 300 mg capsule Take 1 capsule (300 mg total) by mouth 2 (two) times a day 180 capsule 1    Multiple Vitamin (MULTIVITAMIN) capsule Take 1 capsule by mouth daily      traZODone (DESYREL) 50 mg tablet Take 4 tablets (200 mg total) by mouth daily at bedtime 90 tablet 1    venlafaxine (EFFEXOR-XR) 75 mg 24 hr capsule Take 1 capsule (75 mg total) by mouth daily for 30 days 30 capsule 0     No current facility-administered medications on file prior to visit        Allergies   Allergen Reactions    Banana Hives     Itching and swelling     Crab (Diagnostic) Hives    Cleocin [Clindamycin]      Causes C Diff    Latex      Pt told not to be in contact with latex because of banana allergy    Penicillins Other (See Comments)     Causes vaginal itching and must take Diflucan with it        Objective     /77   Pulse 78   Ht 5' 4" (1 626 m)   Wt 111 kg (245 lb 8 oz)   BMI 42 14 kg/m²      PE:  AAOx 3  WDWN  Hearing intact, no drainage from eyes  no audible wheezing  no abdominal distension  LE compartments soft, skin intact    Ortho Exam:  right Knee:   No erythema  no swelling  no effusion  no warmth  AROM: full  TTP over medial joint line   Stable to varus/valgus stress    Imaging Studies: I have personally reviewed pertinent films in PACS  X-ray right knee: moderate DJD  Large joint arthrocentesis: R knee  Date/Time: 8/8/2019 1:04 PM  Consent given by: patient  Site marked: site marked  Timeout: Immediately prior to procedure a time out was called to verify the correct patient, procedure, equipment, support staff and site/side marked as required   Supporting Documentation  Indications: pain   Procedure Details  Location: knee - R knee  Needle size: 22 G  Approach: anterolateral  Medications administered: 3 mL lidocaine 1 %; 2 mL methylPREDNISolone acetate 40 mg/mL    Patient tolerance: patient tolerated the procedure well with no immediate complications  Dressing:  Sterile dressing applied

## 2019-08-12 ENCOUNTER — SOCIAL WORK (OUTPATIENT)
Dept: BEHAVIORAL/MENTAL HEALTH CLINIC | Facility: CLINIC | Age: 59
End: 2019-08-12
Payer: COMMERCIAL

## 2019-08-12 DIAGNOSIS — F31.75 BIPOLAR 1 DISORDER, DEPRESSED, PARTIAL REMISSION (HCC): Primary | ICD-10-CM

## 2019-08-12 PROCEDURE — 90834 PSYTX W PT 45 MINUTES: CPT | Performed by: SOCIAL WORKER

## 2019-08-12 NOTE — PSYCH
Psychotherapy Provided: Individual Psychotherapy 50 minutes   Provider met with patient on this day for a 45 minute follow up appointment  Patient shared that there have been no changes since her last appointment  She shared that she and her  spent the weekend at home  She shared that they usually go out on Saturday to eat but they decided to stay in and watch TV and eat leftovers  She shared that they talked and that this is something that they don't get to do very often  Patient shared that she "didn't want to come to her appointment today", she shared it was a struggle for her to get out of bed  She shared that she "doesn't have much motivation except to go to work  Discussed patient current motivation and the relation to her medication  Patient shared that she had more motivation when she was on the Abilify but it caused many side effects for her  She is hoping to try the brand name abilify to see if that will make a difference for her  She shared that this is what her prescribing NP had told her  She follows up with her weekly  Discussed patient Bi polar warning signs for both depression and swapna  Also provided hand outs with this information and well as challenging negative thoughts work sheets  Discussed the importance of thoughts in regards to depression  Patient is receptive to completing the assignments and discussing at her next scheduled appointment  Patient to follow up with provider in 2 weeks  Length of time in session: 45 minutes, follow up in 2 week    Goals addressed in session: Goal 1     Pain:      none    0    Current suicide risk : Low     Patient reports no SI or HI  Behavioral Health Treatment Plan [facility]: Diagnosis and Treatment Plan explained to Frances Davila relates understanding diagnosis and is agreeable to Treatment Plan   Yes

## 2019-08-14 ENCOUNTER — TELEPHONE (OUTPATIENT)
Dept: PSYCHIATRY | Facility: CLINIC | Age: 59
End: 2019-08-14

## 2019-08-29 ENCOUNTER — OFFICE VISIT (OUTPATIENT)
Dept: URGENT CARE | Facility: CLINIC | Age: 59
End: 2019-08-29
Payer: COMMERCIAL

## 2019-08-29 VITALS
HEIGHT: 64 IN | HEART RATE: 60 BPM | SYSTOLIC BLOOD PRESSURE: 136 MMHG | OXYGEN SATURATION: 97 % | RESPIRATION RATE: 18 BRPM | TEMPERATURE: 98.6 F | BODY MASS INDEX: 41.83 KG/M2 | DIASTOLIC BLOOD PRESSURE: 63 MMHG | WEIGHT: 245 LBS

## 2019-08-29 DIAGNOSIS — G25.81 RESTLESS LEGS: Primary | ICD-10-CM

## 2019-08-29 PROCEDURE — G0382 LEV 3 HOSP TYPE B ED VISIT: HCPCS | Performed by: NURSE PRACTITIONER

## 2019-08-29 NOTE — PATIENT INSTRUCTIONS
Recommend follow-up with PCP  Proceed ER with any worsening symptoms    Restless Legs Syndrome   WHAT YOU NEED TO KNOW:   Restless legs syndrome (RLS) is a condition that causes a powerful urge to move your legs and feet  You may also have tingling, creeping, itching, or throbbing sensations in your legs  You may have discomfort or pain  Movement relieves the symptoms for a short time  RLS is usually worse late in the day and at night  Your symptoms may come and go for days or weeks at a time, and worsen during periods of stress  DISCHARGE INSTRUCTIONS:   Contact your healthcare provider if:   · You cannot sleep because of your symptoms  · Your symptoms are getting worse  · You have questions or concerns about your condition or care  Medicines:   · Medicines  may help decrease your RLS symptoms  · Take your medicine as directed  Contact your healthcare provider if you think your medicine is not helping or if you have side effects  Tell him of her if you are allergic to any medicine  Keep a list of the medicines, vitamins, and herbs you take  Include the amounts, and when and why you take them  Bring the list or the pill bottles to follow-up visits  Carry your medicine list with you in case of an emergency  Manage your symptoms:   · Keep your legs warm  Wear thick socks or use an electric blanket  It may also help to take a hot bath or massage your legs before bedtime  · Exercise regularly  Moderate physical activity such as walking and stretching may help relieve your symptoms  Ask your healthcare provider about the best exercise plan for you  · Get enough sleep  You may need to go to bed earlier to get the sleep you need  Go to bed and wake up at the same time every day  · Do not drink caffeine or alcohol in the evening  Do not smoke or use tobacco products in the evening  Caffeine, alcohol, and tobacco can prevent you from sleeping well    Follow up with your healthcare provider as directed:  Write down your questions so you remember to ask them during your visits  © 2017 [address] [employer] Information is for End User's use only and may not be sold, redistributed or otherwise used for commercial purposes  All illustrations and images included in CareNotes® are the copyrighted property of A D A M [employer] Inc  or [employer]  The above information is an  only  It is not intended as medical advice for individual conditions or treatments  Talk to your doctor, nurse or pharmacist before following any medical regimen to see if it is safe and effective for you

## 2019-08-29 NOTE — PROGRESS NOTES
[facility]        NAME: Jackie Kay is a 60 y o  female  : 1960    MRN: [id number]  DATE: 2019  TIME: 1:01 PM    Assessment and Plan   Restless legs [G25 81]  1  Restless legs           Patient Instructions     Recommend follow-up with PCP  Proceed ER with any worsening symptoms      Follow up with PCP in 3-5 days  Proceed to  ER if symptoms worsen  Chief Complaint     Chief Complaint   Patient presents with    Fatigue     Pt c/o weakness, shakey legs, nausea and vomting x 1 day  History of Present Illness       Patient is a 59-year-old female presents with a 2 day history of restless legs  Notes difficulty sleeping states my legs just feel restless    States that she does take prescribed trazodone and Klonopin before bed  Patient denies any numbness, tingling, decreased sensation in bilateral lower extremities  Denies any weakness  Denies any bowel bladder incontinence  Denies any difficulty ambulating  Patient also notes 2 days ago having a few episodes of vomiting and nausea  States that symptoms have resolved  Denies any abdominal pain, nausea, vomiting, or diarrhea at this time  Denies any fever, chills, body aches  Denies any urinary complaints  Denies any headache, dizziness or feeling lightheaded  Review of Systems   Review of Systems   Constitutional: Negative for chills, diaphoresis, fatigue and fever  HENT: Negative  Eyes: Negative  Respiratory: Negative for cough, chest tightness and shortness of breath  Cardiovascular: Negative for chest pain, palpitations and leg swelling  Gastrointestinal: Negative for abdominal pain, diarrhea, nausea and vomiting  Genitourinary: Negative  Musculoskeletal: Negative for arthralgias, back pain, gait problem, joint swelling, myalgias, neck pain and neck stiffness  Skin: Negative for rash     Neurological: Negative for dizziness, facial asymmetry, weakness, light-headedness, numbness and headaches           Current Medications       Current Outpatient Medications:     acetaminophen (TYLENOL) 500 mg tablet, Take 500 mg by mouth every 6 (six) hours as needed for mild pain, Disp: , Rfl:     ALPRAZolam (XANAX) 0 5 mg tablet, Take 0 5 mg by mouth 2 (two) times a day as needed for anxiety  , Disp: , Rfl:     aspirin (ECOTRIN) 325 mg EC tablet, Take 1 tablet (325 mg total) by mouth 2 (two) times a day, Disp: 28 tablet, Rfl: 0    buPROPion (WELLBUTRIN SR) 100 mg 12 hr tablet, Take 1 tablet (100 mg total) by mouth daily, Disp: 30 tablet, Rfl: 1    cariprazine (VRAYLAR) 3 MG capsule, Take 1 capsule (3 mg total) by mouth daily, Disp: 30 capsule, Rfl: 5    clonazePAM (KlonoPIN) 0 5 mg tablet, Take 1 tablet (0 5 mg total) by mouth 3 (three) times a day, Disp: 270 tablet, Rfl: 1    lithium carbonate 300 mg capsule, Take 1 capsule (300 mg total) by mouth 2 (two) times a day, Disp: 180 capsule, Rfl: 1    Multiple Vitamin (MULTIVITAMIN) capsule, Take 1 capsule by mouth daily, Disp: , Rfl:     traZODone (DESYREL) 50 mg tablet, Take 4 tablets (200 mg total) by mouth daily at bedtime, Disp: 90 tablet, Rfl: 1    venlafaxine (EFFEXOR-XR) 75 mg 24 hr capsule, Take 1 capsule (75 mg total) by mouth daily for 30 days, Disp: 30 capsule, Rfl: 0    Current Allergies     Allergies as of 08/29/2019 - Reviewed 08/29/2019   Allergen Reaction Noted    Banana Hives 09/27/2018    Crab (diagnostic) Hives 09/10/2018    Cleocin [clindamycin]  09/27/2018    Latex  09/27/2018    Penicillins Other (See Comments) 07/11/2018            The following portions of the patient's history were reviewed and updated as appropriate: allergies, current medications, past family history, past medical history, past social history, past surgical history and problem list      Past Medical History:   Diagnosis Date    Anxiety     Arthritis     Back pain     Lower back    Bipolar 1 disorder ([facility] [address] )     Depression     Diverticulosis     Hip dysplasia     Bilateral    Hyperlipidemia     Knee pain, right     Obesity     Psoriasis     Along hairline   Stress incontinence        Past Surgical History:   Procedure Laterality Date    COLONOSCOPY      DILATION AND CURETTAGE OF UTERUS      One D&C and one D&E    ESOPHAGOGASTRODUODENOSCOPY      NASAL SEPTUM SURGERY      GA KNEE SCOPE,MED/LAT MENISECTOMY Right 9/27/2018    Procedure: ARTHROSCOPY KNEE; PARTIAL MEDIAL MENISECTOMY; CHONDROPLASTY;  Surgeon: Fatou Boyer, DO;  Location: [facility];  Service: Orthopedics    TONSILECTOMY AND ADNOIDECTOMY      TONSILLECTOMY         Family History   Problem Relation Age of Onset    No Known Problems Mother     No Known Problems Father          Medications have been verified  Objective   /63   Pulse 60 Comment: radial pulse  Temp 98 6 °F (37 °C)   Resp 18   Ht 5' 4" (1 626 m)   Wt 111 kg (245 lb)   SpO2 97%   BMI 42 05 kg/m²        Physical Exam     Physical Exam   Constitutional: She is oriented to person, place, and time  She appears well-developed and well-nourished  No distress  HENT:   Head: Normocephalic and atraumatic  Cardiovascular: Normal rate, regular rhythm, S1 normal, S2 normal, normal heart sounds, intact distal pulses and normal pulses  Pulmonary/Chest: Effort normal and breath sounds normal    Musculoskeletal:        Right upper leg: Normal         Left upper leg: Normal         Right lower leg: Normal         Left lower leg: Normal    Neurological: She is alert and oriented to person, place, and time  She has normal strength and normal reflexes  No cranial nerve deficit  Skin: She is not diaphoretic

## 2019-09-03 ENCOUNTER — TRANSCRIBE ORDERS (OUTPATIENT)
Dept: ADMINISTRATIVE | Facility: HOSPITAL | Age: 59
End: 2019-09-03

## 2019-09-03 DIAGNOSIS — Z12.39 BREAST SCREENING, UNSPECIFIED: Primary | ICD-10-CM

## 2019-09-04 ENCOUNTER — OFFICE VISIT (OUTPATIENT)
Dept: PSYCHIATRY | Facility: CLINIC | Age: 59
End: 2019-09-04
Payer: COMMERCIAL

## 2019-09-04 DIAGNOSIS — F31.9 BIPOLAR I DISORDER WITH DEPRESSION (HCC): ICD-10-CM

## 2019-09-04 DIAGNOSIS — F33.1 MAJOR DEPRESSIVE DISORDER, RECURRENT, MODERATE (HCC): ICD-10-CM

## 2019-09-04 PROCEDURE — 99213 OFFICE O/P EST LOW 20 MIN: CPT | Performed by: NURSE PRACTITIONER

## 2019-09-04 RX ORDER — LITHIUM CARBONATE 300 MG/1
CAPSULE ORAL
Qty: 180 CAPSULE | Refills: 1 | Status: SHIPPED | OUTPATIENT
Start: 2019-09-04 | End: 2019-11-21 | Stop reason: SDUPTHER

## 2019-09-04 RX ORDER — CLONAZEPAM 0.5 MG/1
0.5 TABLET ORAL 2 TIMES DAILY
Qty: 180 TABLET | Refills: 1 | Status: SHIPPED | OUTPATIENT
Start: 2019-09-04 | End: 2019-11-21 | Stop reason: SDUPTHER

## 2019-09-04 RX ORDER — TRAZODONE HYDROCHLORIDE 50 MG/1
100 TABLET ORAL
Qty: 90 TABLET | Refills: 1 | Status: SHIPPED | OUTPATIENT
Start: 2019-09-04 | End: 2019-11-12 | Stop reason: SDUPTHER

## 2019-09-04 RX ORDER — VENLAFAXINE HYDROCHLORIDE 75 MG/1
75 CAPSULE, EXTENDED RELEASE ORAL DAILY
Qty: 90 CAPSULE | Refills: 3 | Status: SHIPPED | OUTPATIENT
Start: 2019-09-04 | End: 2019-11-21 | Stop reason: SDUPTHER

## 2019-09-04 RX ORDER — CLONAZEPAM 0.5 MG/1
0.5 TABLET ORAL 2 TIMES DAILY
Qty: 180 TABLET | Refills: 1 | Status: SHIPPED | OUTPATIENT
Start: 2019-09-04 | End: 2019-09-04 | Stop reason: SDUPTHER

## 2019-09-04 NOTE — PSYCH
PROGRESS NOTE        Shantanu Sarmiento      Name and Date of Birth:  Casey Ambrose 60 y o  1960    Date of Visit: 09/04/19    SUBJECTIVE:    Lacie Angeles continues to report mood instability lability  She continues to report mild symptoms of depression  We have tried various medications which unfortunately have just caused her side effects  Abilify  caused her restless leg as well as Vraylar  She has since stopped Vraylar and Wellbutrin  She continues with which she had been on before which is Effexor, lithium and Klonopin  We did discuss possible alternative treatment with [address] [place]  She will research this and consider this as a treatment option  I will follow up with her within the next 8-10 weeks  She denies suicidal ideation, intent or plan at present, has no suicidal ideation, intent or plan at present  She denies any auditory hallucinations and visual hallucinations, denies any other delusional thinking, denies any delusional thinking  She denies any side effects from medications    HPI ROS Appetite Changes and Sleep: normal appetite, normal sleep    Review Of Systems:      Constitutional Negative   ENT Negative   Cardiovascular Negative   Respiratory As Noted in HPI   Gastrointestinal Negative   Genitourinary Negative   Musculoskeletal Negative   Integumentary Negative   Neurological Negative   Endocrine Negative   Other Symptoms Negative and None       Laboratory Results: No results found for this or any previous visit      Substance Abuse History:    Social History     Substance and Sexual Activity   Drug Use No       Family Psychiatric History:     Family History   Problem Relation Age of Onset    No Known Problems Mother     No Known Problems Father        The following portions of the patient's history were reviewed and updated as appropriate: past family history, past medical history, past social history, past surgical history and problem list     Social History     Socioeconomic History    Marital status: /Civil Union     Spouse name: Not on file    Number of children: Not on file    Years of education: Not on file    Highest education level: Not on file   Occupational History    Not on file   Social Needs    Financial resource strain: Not on file    Food insecurity:     Worry: Not on file     Inability: Not on file    Transportation needs:     Medical: Not on file     Non-medical: Not on file   Tobacco Use    Smoking status: Never Smoker    Smokeless tobacco: Never Used   Substance and Sexual Activity    Alcohol use: Yes     Comment: 5 yearly    Drug use: No    Sexual activity: Not on file   Lifestyle    Physical activity:     Days per week: Not on file     Minutes per session: Not on file    Stress: Not on file   Relationships    Social connections:     Talks on phone: Not on file     Gets together: Not on file     Attends Je[employer]'s witness service: Not on file     Active member of club or organization: Not on file     Attends meetings of clubs or organizations: Not on file     Relationship status: Not on file    Intimate partner violence:     Fear of current or ex partner: Not on file     Emotionally abused: Not on file     Physically abused: Not on file     Forced sexual activity: Not on file   Other Topics Concern    Not on file   Social History Narrative    Not on file     Social History     Social History Narrative    Not on file        Social History     Tobacco History     Smoking Status  Never Smoker    Smokeless Tobacco Use  Never Used          Alcohol History     Alcohol Use Status  Yes Comment  5 yearly          Drug Use     Drug Use Status  No          Sexual Activity     Sexually Active  Not Asked          Activities of Daily Living    Not Asked                     OBJECTIVE:     Mental Status Evaluation:    Appearance age appropriate, casually dressed   Behavior pleasant, cooperative   Speech normal volume, normal pitch   Mood Mild depression   Affect Constricted   Thought Processes logical   Associations intact associations   Thought Content Normal   Perceptual Disturbances: None   Abnormal Thoughts  Risk Potential Suicidal ideation - None  Homicidal ideation - None  Potential for aggression - No   Orientation oriented to person, place, time/date and situation   Memory recent and remote memory grossly intact   Cosciousness alert and awake   Attention Span attention span and concentration are age appropriate   Intellect Appears to be of Average Intelligence   Insight Fair   Judgement Fair   Muscle Strength and  Gait muscle strength and tone were normal   Language no difficulty naming common objects   Fund of Knowledge displays adequate knowledge of current events   Pain None   Pain Scale 0       Assessment/Plan:       Diagnoses and all orders for this visit:    [facility]  -     Discontinue: clonazePAM (KlonoPIN) 0 5 mg tablet; Take 1 tablet (0 5 mg total) by mouth 2 (two) times a day  -     lithium carbonate 300 mg capsule; 2 HS  -     traZODone (DESYREL) 50 mg tablet; Take 2 tablets (100 mg total) by mouth daily at bedtime  -     clonazePAM (KlonoPIN) 0 5 mg tablet; Take 1 tablet (0 5 mg total) by mouth 2 (two) times a day    Major depressive disorder, recurrent, moderate (HCC)  -     venlafaxine (EFFEXOR-XR) 75 mg 24 hr capsule; Take 1 capsule (75 mg total) by mouth daily for 30 days          Treatment Recommendations/Precautions:    Continue current medications:    Klonopin 0 5 mg b i d  Lithium carbonate 600 mg HS  Trazodone 200 mg HS  Effexor XR 75 mg daily  Follow-up in 8-10 weeks  Patient is reviewing information regarding TMS    Risks/Benefits      Risks, Benefits And Possible Side Effects Of Medications:    Risks, benefits, and possible side effects of medications explained to patient and patient verbalizes understanding and agreement for treatment      Controlled Medication Discussion:     Not applicable    Psychotherapy Provided:     Individual psychotherapy provided: No

## 2019-09-09 ENCOUNTER — HOSPITAL ENCOUNTER (OUTPATIENT)
Dept: MAMMOGRAPHY | Facility: HOSPITAL | Age: 59
Discharge: HOME | End: 2019-09-09
Payer: COMMERCIAL

## 2019-09-09 VITALS — BODY MASS INDEX: 40.12 KG/M2 | HEIGHT: 64 IN | WEIGHT: 235 LBS

## 2019-09-09 DIAGNOSIS — Z12.39 BREAST SCREENING, UNSPECIFIED: ICD-10-CM

## 2019-09-09 PROCEDURE — 77067 SCR MAMMO BI INCL CAD: CPT

## 2019-09-09 PROCEDURE — 77063 BREAST TOMOSYNTHESIS BI: CPT

## 2019-11-11 ENCOUNTER — TELEPHONE (OUTPATIENT)
Dept: PSYCHIATRY | Facility: CLINIC | Age: 59
End: 2019-11-11

## 2019-11-11 NOTE — TELEPHONE ENCOUNTER
Hayley Ashalatricia has an appointment scheduled for 11/21/19, she called today to request a refill of trazadone 100mg  She said she takes a total of 200mg  A day, and will need a 90 day supply

## 2019-11-12 DIAGNOSIS — F31.9 BIPOLAR I DISORDER WITH DEPRESSION (HCC): ICD-10-CM

## 2019-11-12 RX ORDER — TRAZODONE HYDROCHLORIDE 100 MG/1
TABLET ORAL
Qty: 180 TABLET | Refills: 1 | Status: SHIPPED | OUTPATIENT
Start: 2019-11-12 | End: 2019-11-21 | Stop reason: SDUPTHER

## 2019-11-21 ENCOUNTER — OFFICE VISIT (OUTPATIENT)
Dept: PSYCHIATRY | Facility: CLINIC | Age: 59
End: 2019-11-21
Payer: COMMERCIAL

## 2019-11-21 DIAGNOSIS — F33.1 MAJOR DEPRESSIVE DISORDER, RECURRENT, MODERATE (HCC): ICD-10-CM

## 2019-11-21 DIAGNOSIS — F31.9 BIPOLAR I DISORDER WITH DEPRESSION (HCC): ICD-10-CM

## 2019-11-21 DIAGNOSIS — F41.1 GENERALIZED ANXIETY DISORDER: ICD-10-CM

## 2019-11-21 DIAGNOSIS — F31.9 BIPOLAR DEPRESSION (HCC): Primary | ICD-10-CM

## 2019-11-21 PROCEDURE — 99213 OFFICE O/P EST LOW 20 MIN: CPT | Performed by: NURSE PRACTITIONER

## 2019-11-21 RX ORDER — ALPRAZOLAM 0.5 MG/1
0.5 TABLET ORAL 2 TIMES DAILY PRN
Qty: 180 TABLET | Refills: 0 | Status: SHIPPED | OUTPATIENT
Start: 2019-11-21 | End: 2020-02-25 | Stop reason: SDUPTHER

## 2019-11-21 RX ORDER — ALPRAZOLAM 0.5 MG/1
0.5 TABLET ORAL 2 TIMES DAILY PRN
Qty: 180 TABLET | Refills: 0 | Status: SHIPPED | OUTPATIENT
Start: 2019-11-21 | End: 2019-11-21 | Stop reason: SDUPTHER

## 2019-11-21 RX ORDER — LITHIUM CARBONATE 300 MG/1
CAPSULE ORAL
Qty: 180 CAPSULE | Refills: 1 | Status: SHIPPED | OUTPATIENT
Start: 2019-11-21 | End: 2020-02-25 | Stop reason: SDUPTHER

## 2019-11-21 RX ORDER — CLONAZEPAM 0.5 MG/1
0.5 TABLET ORAL 2 TIMES DAILY
Qty: 180 TABLET | Refills: 1 | Status: SHIPPED | OUTPATIENT
Start: 2019-11-21 | End: 2019-11-21 | Stop reason: SDUPTHER

## 2019-11-21 RX ORDER — CLONAZEPAM 0.5 MG/1
0.5 TABLET ORAL 2 TIMES DAILY
Qty: 180 TABLET | Refills: 1 | Status: SHIPPED | OUTPATIENT
Start: 2019-11-21 | End: 2020-02-25 | Stop reason: SDUPTHER

## 2019-11-21 RX ORDER — ALPRAZOLAM 0.5 MG/1
0.5 TABLET ORAL 2 TIMES DAILY PRN
Qty: 180 TABLET | Refills: 0 | Status: SHIPPED | OUTPATIENT
Start: 2019-11-21 | End: 2019-11-21

## 2019-11-21 RX ORDER — TRAZODONE HYDROCHLORIDE 100 MG/1
TABLET ORAL
Qty: 180 TABLET | Refills: 1 | Status: SHIPPED | OUTPATIENT
Start: 2019-11-21 | End: 2020-02-25 | Stop reason: SDUPTHER

## 2019-11-21 RX ORDER — VENLAFAXINE HYDROCHLORIDE 75 MG/1
75 CAPSULE, EXTENDED RELEASE ORAL DAILY
Qty: 90 CAPSULE | Refills: 3 | Status: SHIPPED | OUTPATIENT
Start: 2019-11-21 | End: 2020-02-25 | Stop reason: SDUPTHER

## 2019-11-21 NOTE — PSYCH
PROGRESS NOTE        [facility]      Name and Date of Birth:  Clarissa Plascencia 60 y o  1960    Date of Visit: 11/21/19    SUBJECTIVE:    Carlo Roblero presents today for treatment of major depressive disorder, mood disorder and bipolar depression  She smiling and happy today  She tells me that she and her  found a ranch home and purchased it  Presently, they live in a 4 bedroom home which has become too large for them  She is thrilled with the downsizing idea and is looking forward to the moving challenge  Psychiatrically, she is doing very well  She has mood reporting no complaints of mood dysfunction, mood disorder or breakthrough symptoms of depression  Will continue her on her current level of medication and she will follow-up with me in 3 months or sooner if necessary  I have ordered labs for her to complete before she returns  She denies suicidal ideation, intent or plan at present, has no suicidal ideation, intent or plan at present  She denies any auditory hallucinations and visual hallucinations, denies any other delusional thinking, denies any delusional thinking  She denies any side effects from medications    HPI ROS Appetite Changes and Sleep: normal appetite, normal sleep    Review Of Systems:      Constitutional Negative   ENT Negative   Cardiovascular Negative   Respiratory As Noted in HPI   Gastrointestinal Negative   Genitourinary Negative   Musculoskeletal Negative   Integumentary Negative   Neurological Negative   Endocrine Negative   Other Symptoms Negative and None       Laboratory Results: No results found for this or any previous visit      Substance Abuse History:    Social History     Substance and Sexual Activity   Drug Use No       Family Psychiatric History:     Family History   Problem Relation Age of Onset    Asthma Mother     No Known Problems Father     Rectal cancer Sister 62    No Known Problems Daughter     No Known Problems Maternal Grandmother     No Known Problems Maternal Grandfather     No Known Problems Paternal Grandmother     No Known Problems Paternal Grandfather     No Known Problems Paternal Aunt        The following portions of the patient's history were reviewed and updated as appropriate: past family history, past medical history, past social history, past surgical history and problem list     Social History     Socioeconomic History    Marital status: /Civil Union     Spouse name: Not on file    Number of children: Not on file    Years of education: Not on file    Highest education level: Not on file   Occupational History    Not on file   Social Needs    Financial resource strain: Not on file    Food insecurity:     Worry: Not on file     Inability: Not on file    Transportation needs:     Medical: Not on file     Non-medical: Not on file   Tobacco Use    Smoking status: Never Smoker    Smokeless tobacco: Never Used   Substance and Sexual Activity    Alcohol use: Yes     Comment: 5 yearly    Drug use: No    Sexual activity: Not on file   Lifestyle    Physical activity:     Days per week: Not on file     Minutes per session: Not on file    Stress: Not on file   Relationships    Social connections:     Talks on phone: Not on file     Gets together: Not on file     Attends Shinto service: Not on file     Active member of club or organization: Not on file     Attends meetings of clubs or organizations: Not on file     Relationship status: Not on file    Intimate partner violence:     Fear of current or ex partner: Not on file     Emotionally abused: Not on file     Physically abused: Not on file     Forced sexual activity: Not on file   Other Topics Concern    Not on file   Social History Narrative    Not on file     Social History     Social History Narrative    Not on file        Social History     Tobacco History     Smoking Status  Never Smoker    Smokeless Tobacco Use  Never Used          Alcohol History     Alcohol Use Status  Yes Comment  5 yearly          Drug Use     Drug Use Status  No          Sexual Activity     Sexually Active  Not Asked          Activities of Daily Living    Not Asked                     OBJECTIVE:     Mental Status Evaluation:    Appearance age appropriate, casually dressed   Behavior pleasant, cooperative   Speech normal volume, normal pitch   Mood improved   Affect brighter   Thought Processes logical   Associations intact associations   Thought Content normal   Perceptual Disturbances: none   Abnormal Thoughts  Risk Potential Suicidal ideation - None  Homicidal ideation - None  Potential for aggression - No   Orientation oriented to person, place, time/date and situation   Memory recent and remote memory grossly intact   Cosciousness alert and awake   Attention Span attention span and concentration are age appropriate   Intellect Appears to be of Average Intelligence   Insight Good   Judgement Good   Muscle Strength and  Gait muscle strength and tone were normal   Language no difficulty naming common objects   Fund of Knowledge displays adequate knowledge of current events   Pain None   Pain Scale 0       Assessment/Plan:       There are no diagnoses linked to this encounter  Treatment Recommendations/Precautions:    Continue current medications:  Xanax 0 5 mg twice a day as needed for breakthrough symptoms of severe anxiety  Klonopin 0 5 mg b i d  Lithium carbonate 600 mg HS  Trazodone 200 mg HS  Effexor XR 75 mg daily  Follow-up in 3 months  Check labs  Risks/Benefits      Risks, Benefits And Possible Side Effects Of Medications:    Risks, benefits, and possible side effects of medications explained to patient and patient verbalizes understanding and agreement for treatment  Controlled Medication Discussion:  No history of any overuse or abuse of controlled substances      Not applicable    Psychotherapy Provided:     Individual psychotherapy provided: No

## 2020-02-11 ENCOUNTER — TRANSCRIBE ORDERS (OUTPATIENT)
Dept: LAB | Facility: CLINIC | Age: 60
End: 2020-02-11

## 2020-02-11 ENCOUNTER — APPOINTMENT (OUTPATIENT)
Dept: LAB | Facility: CLINIC | Age: 60
End: 2020-02-11
Payer: COMMERCIAL

## 2020-02-11 DIAGNOSIS — F31.9 BIPOLAR I DISORDER WITH DEPRESSION (HCC): ICD-10-CM

## 2020-02-11 DIAGNOSIS — E78.2 MIXED HYPERLIPIDEMIA: ICD-10-CM

## 2020-02-11 DIAGNOSIS — F31.9 BIPOLAR DEPRESSION (HCC): ICD-10-CM

## 2020-02-11 DIAGNOSIS — E78.2 MIXED HYPERLIPIDEMIA: Primary | ICD-10-CM

## 2020-02-11 LAB
ALBUMIN SERPL BCP-MCNC: 3.8 G/DL (ref 3.5–5)
ALP SERPL-CCNC: 79 U/L (ref 46–116)
ALT SERPL W P-5'-P-CCNC: 35 U/L (ref 12–78)
ANION GAP SERPL CALCULATED.3IONS-SCNC: 5 MMOL/L (ref 4–13)
AST SERPL W P-5'-P-CCNC: 21 U/L (ref 5–45)
BILIRUB SERPL-MCNC: 0.63 MG/DL (ref 0.2–1)
BUN SERPL-MCNC: 10 MG/DL (ref 5–25)
CALCIUM ALBUM COR SERPL-MCNC: 10.5 MG/DL (ref 8.3–10.1)
CALCIUM SERPL-MCNC: 10.3 MG/DL (ref 8.3–10.1)
CHLORIDE SERPL-SCNC: 111 MMOL/L (ref 100–108)
CHOLEST SERPL-MCNC: 136 MG/DL (ref 50–200)
CO2 SERPL-SCNC: 26 MMOL/L (ref 21–32)
CREAT SERPL-MCNC: 0.9 MG/DL (ref 0.6–1.3)
ERYTHROCYTE [DISTWIDTH] IN BLOOD BY AUTOMATED COUNT: 13.6 % (ref 11.6–15.1)
EST. AVERAGE GLUCOSE BLD GHB EST-MCNC: 117 MG/DL
GFR SERPL CREATININE-BSD FRML MDRD: 70 ML/MIN/1.73SQ M
GLUCOSE P FAST SERPL-MCNC: 117 MG/DL (ref 65–99)
HBA1C MFR BLD: 5.7 %
HCT VFR BLD AUTO: 47.3 % (ref 34.8–46.1)
HDLC SERPL-MCNC: 37 MG/DL
HGB BLD-MCNC: 15 G/DL (ref 11.5–15.4)
LDLC SERPL CALC-MCNC: 59 MG/DL (ref 0–100)
LITHIUM SERPL-SCNC: 0.5 MMOL/L (ref 0.5–1)
MCH RBC QN AUTO: 27.7 PG (ref 26.8–34.3)
MCHC RBC AUTO-ENTMCNC: 31.7 G/DL (ref 31.4–37.4)
MCV RBC AUTO: 87 FL (ref 82–98)
NONHDLC SERPL-MCNC: 99 MG/DL
PLATELET # BLD AUTO: 169 THOUSANDS/UL (ref 149–390)
PMV BLD AUTO: 13 FL (ref 8.9–12.7)
POTASSIUM SERPL-SCNC: 3.8 MMOL/L (ref 3.5–5.3)
PROT SERPL-MCNC: 7.6 G/DL (ref 6.4–8.2)
RBC # BLD AUTO: 5.42 MILLION/UL (ref 3.81–5.12)
SODIUM SERPL-SCNC: 142 MMOL/L (ref 136–145)
TRIGL SERPL-MCNC: 199 MG/DL
TSH SERPL DL<=0.05 MIU/L-ACNC: 1.01 UIU/ML (ref 0.36–3.74)
WBC # BLD AUTO: 8.19 THOUSAND/UL (ref 4.31–10.16)

## 2020-02-11 PROCEDURE — 80178 ASSAY OF LITHIUM: CPT

## 2020-02-11 PROCEDURE — 85027 COMPLETE CBC AUTOMATED: CPT

## 2020-02-11 PROCEDURE — 36415 COLL VENOUS BLD VENIPUNCTURE: CPT

## 2020-02-11 PROCEDURE — 84443 ASSAY THYROID STIM HORMONE: CPT

## 2020-02-11 PROCEDURE — 80053 COMPREHEN METABOLIC PANEL: CPT

## 2020-02-11 PROCEDURE — 83036 HEMOGLOBIN GLYCOSYLATED A1C: CPT

## 2020-02-11 PROCEDURE — 80061 LIPID PANEL: CPT

## 2020-02-25 ENCOUNTER — OFFICE VISIT (OUTPATIENT)
Dept: PSYCHIATRY | Facility: CLINIC | Age: 60
End: 2020-02-25
Payer: COMMERCIAL

## 2020-02-25 DIAGNOSIS — F41.1 GENERALIZED ANXIETY DISORDER: ICD-10-CM

## 2020-02-25 DIAGNOSIS — F31.31 BIPOLAR AFFECTIVE DISORDER, DEPRESSED, MILD (HCC): Primary | ICD-10-CM

## 2020-02-25 DIAGNOSIS — F31.9 BIPOLAR I DISORDER WITH DEPRESSION (HCC): ICD-10-CM

## 2020-02-25 DIAGNOSIS — F33.1 MAJOR DEPRESSIVE DISORDER, RECURRENT, MODERATE (HCC): ICD-10-CM

## 2020-02-25 PROCEDURE — 99214 OFFICE O/P EST MOD 30 MIN: CPT | Performed by: NURSE PRACTITIONER

## 2020-02-25 RX ORDER — LITHIUM CARBONATE 300 MG/1
CAPSULE ORAL
Qty: 180 CAPSULE | Refills: 2 | Status: SHIPPED | OUTPATIENT
Start: 2020-02-25 | End: 2020-06-22 | Stop reason: SDUPTHER

## 2020-02-25 RX ORDER — TRAZODONE HYDROCHLORIDE 100 MG/1
TABLET ORAL
Qty: 180 TABLET | Refills: 2 | Status: SHIPPED | OUTPATIENT
Start: 2020-02-25 | End: 2020-06-22 | Stop reason: SDUPTHER

## 2020-02-25 RX ORDER — ALPRAZOLAM 0.5 MG/1
0.5 TABLET ORAL 2 TIMES DAILY PRN
Qty: 180 TABLET | Refills: 0 | Status: SHIPPED | OUTPATIENT
Start: 2020-02-25 | End: 2020-06-22 | Stop reason: SDUPTHER

## 2020-02-25 RX ORDER — CLONAZEPAM 0.5 MG/1
0.5 TABLET ORAL 2 TIMES DAILY
Qty: 180 TABLET | Refills: 1 | Status: SHIPPED | OUTPATIENT
Start: 2020-02-25 | End: 2020-06-22 | Stop reason: SDUPTHER

## 2020-02-25 RX ORDER — VENLAFAXINE HYDROCHLORIDE 75 MG/1
75 CAPSULE, EXTENDED RELEASE ORAL DAILY
Qty: 90 CAPSULE | Refills: 3 | Status: SHIPPED | OUTPATIENT
Start: 2020-02-25 | End: 2020-06-22 | Stop reason: SDUPTHER

## 2020-02-25 NOTE — PSYCH
PROGRESS NOTE        Shantanu Sarmiento      Name and Date of Birth:  Juliet Louie 60 y o  1960    Date of Visit: 02/25/20    SUBJECTIVE:    Jacky Escobar presents today for treatment of bipolar depression, major depressive disorder and generalized anxiety disorder  On examination today, she is doing very well  She smiling, she is robust and she is more animated  She is still working on a full-time basis and is still working night shift  Recently, she and her  moved into a home which is a downsized for the and her  recently retired  So far, she says life is much better  She is able to manage the home better than she had previously, having less room and less cleaning to do  Also, they have successfully got rid of most of their 33 years of junk that they have compiled during their marriage  So thus far all is well  Recent lab work was well within normal limits with the lithium level of 0 5  Check another level in about 6 months  Follow up in 3 months in [facility] office  She denies suicidal ideation, intent or plan at present, has no suicidal ideation, intent or plan at present  She denies any auditory hallucinations and visual hallucinations, denies any other delusional thinking, denies any delusional thinking  She denies any side effects from medications    HPI ROS Appetite Changes and Sleep: normal appetite, normal sleep    Review Of Systems:      Constitutional Negative   ENT Negative   Cardiovascular Negative   Respiratory As Noted in HPI   Gastrointestinal Negative   Genitourinary Negative   Musculoskeletal Negative   Integumentary Negative   Neurological Negative   Endocrine Negative   Other Symptoms Negative and None       Laboratory Results: No results found for this or any previous visit      Substance Abuse History:    Social History     Substance and Sexual Activity   Drug Use No       Family Psychiatric History:     Family History   Problem Relation Age of Onset    Asthma Mother     No Known Problems Father     Rectal cancer Sister 62    No Known Problems Daughter     No Known Problems Maternal Grandmother     No Known Problems Maternal Grandfather     No Known Problems Paternal Grandmother     No Known Problems Paternal Grandfather     No Known Problems Paternal Aunt        The following portions of the patient's history were reviewed and updated as appropriate: past family history, past medical history, past social history, past surgical history and problem list     Social History     Socioeconomic History    Marital status: /Civil Union     Spouse name: Not on file    Number of children: Not on file    Years of education: Not on file    Highest education level: Not on file   Occupational History    Not on file   Social Needs    Financial resource strain: Not on file    Food insecurity:     Worry: Not on file     Inability: Not on file    Transportation needs:     Medical: Not on file     Non-medical: Not on file   Tobacco Use    Smoking status: Never Smoker    Smokeless tobacco: Never Used   Substance and Sexual Activity    Alcohol use: Yes     Comment: 5 yearly    Drug use: No    Sexual activity: Not on file   Lifestyle    Physical activity:     Days per week: Not on file     Minutes per session: Not on file    Stress: Not on file   Relationships    Social connections:     Talks on phone: Not on file     Gets together: Not on file     Attends Gnosticist service: Not on file     Active member of club or organization: Not on file     Attends meetings of clubs or organizations: Not on file     Relationship status: Not on file    Intimate partner violence:     Fear of current or ex partner: Not on file     Emotionally abused: Not on file     Physically abused: Not on file     Forced sexual activity: Not on file   Other Topics Concern    Not on file   Social History Narrative    Not on file     Social History     Social History Narrative    Not on file        Social History     Tobacco History     Smoking Status  Never Smoker    Smokeless Tobacco Use  Never Used          Alcohol History     Alcohol Use Status  Yes Comment  5 yearly          Drug Use     Drug Use Status  No          Sexual Activity     Sexually Active  Not Asked          Activities of Daily Living    Not Asked                     OBJECTIVE:     Mental Status Evaluation:    Appearance age appropriate, casually dressed   Behavior pleasant, cooperative   Speech normal volume, normal pitch   Mood improved   Affect brighter   Thought Processes logical   Associations intact associations   Thought Content normal   Perceptual Disturbances: none   Abnormal Thoughts  Risk Potential Suicidal ideation - None  Homicidal ideation - None  Potential for aggression -No   Orientation oriented to person, place, time/date and situation   Memory recent and remote memory grossly intact   Cosciousness alert and awake   Attention Span attention span and concentration are age appropriate   Intellect Appears to be of Average Intelligence   Insight Good   Judgement Good   Muscle Strength and  Gait muscle strength and tone were normal   Language no difficulty naming common objects   Fund of Knowledge displays adequate knowledge of current events   Pain None   Pain Scale 0       Assessment/Plan:       There are no diagnoses linked to this encounter  Treatment Recommendations/Precautions:    Continue current medications:  Xanax 0 point 5 mg b i d  As needed for severe breakthrough symptoms of anxiety  Klonopin 0 5 b i d  Lithium carbonate 600 mg HS  Trazodone 100 mg HS  Effexor XR 75 mg daily  Follow-up is in 3 months or sooner if necessary      Risks/Benefits      Risks, Benefits And Possible Side Effects Of Medications:    Risks, benefits, and possible side effects of medications explained to patient and patient verbalizes understanding and agreement for treatment  Controlled Medication Discussion:  No history of any overuse or abuse of controlled substances      Not applicable    Psychotherapy Provided:     Individual psychotherapy provided: No

## 2020-02-26 ENCOUNTER — APPOINTMENT (OUTPATIENT)
Dept: LAB | Facility: CLINIC | Age: 60
End: 2020-02-26
Payer: COMMERCIAL

## 2020-02-26 ENCOUNTER — TRANSCRIBE ORDERS (OUTPATIENT)
Dept: LAB | Facility: CLINIC | Age: 60
End: 2020-02-26

## 2020-02-26 DIAGNOSIS — R79.9 ABNORMAL BLOOD CHEMISTRY: Primary | ICD-10-CM

## 2020-02-26 DIAGNOSIS — E83.30 DISORDER OF PHOSPHORUS METABOLISM: ICD-10-CM

## 2020-02-26 DIAGNOSIS — R79.9 ABNORMAL BLOOD CHEMISTRY: ICD-10-CM

## 2020-02-26 LAB — CA-I BLD-SCNC: 1.28 MMOL/L (ref 1.12–1.32)

## 2020-02-26 PROCEDURE — 36415 COLL VENOUS BLD VENIPUNCTURE: CPT

## 2020-02-26 PROCEDURE — 82330 ASSAY OF CALCIUM: CPT

## 2020-02-26 PROCEDURE — 83970 ASSAY OF PARATHORMONE: CPT

## 2020-02-26 PROCEDURE — 84100 ASSAY OF PHOSPHORUS: CPT

## 2020-02-27 LAB
PHOSPHATE SERPL-MCNC: 3.6 MG/DL (ref 2.3–4.1)
PTH-INTACT SERPL-MCNC: 68.8 PG/ML (ref 18.4–80.1)

## 2020-05-09 ENCOUNTER — TRANSCRIBE ORDERS (OUTPATIENT)
Dept: URGENT CARE | Facility: CLINIC | Age: 60
End: 2020-05-09

## 2020-05-09 ENCOUNTER — APPOINTMENT (OUTPATIENT)
Dept: RADIOLOGY | Facility: CLINIC | Age: 60
End: 2020-05-09
Payer: COMMERCIAL

## 2020-05-09 DIAGNOSIS — M70.61 TROCHANTERIC BURSITIS OF RIGHT HIP: Primary | ICD-10-CM

## 2020-05-09 DIAGNOSIS — M70.61 TROCHANTERIC BURSITIS OF RIGHT HIP: ICD-10-CM

## 2020-05-09 PROCEDURE — 73502 X-RAY EXAM HIP UNI 2-3 VIEWS: CPT

## 2020-06-22 ENCOUNTER — TELEMEDICINE (OUTPATIENT)
Dept: PSYCHIATRY | Facility: CLINIC | Age: 60
End: 2020-06-22
Payer: COMMERCIAL

## 2020-06-22 DIAGNOSIS — F31.9 BIPOLAR DISORDER WITH DEPRESSION (HCC): Primary | ICD-10-CM

## 2020-06-22 DIAGNOSIS — F31.9 BIPOLAR I DISORDER WITH DEPRESSION (HCC): ICD-10-CM

## 2020-06-22 DIAGNOSIS — F41.1 GENERALIZED ANXIETY DISORDER: ICD-10-CM

## 2020-06-22 DIAGNOSIS — F33.1 MAJOR DEPRESSIVE DISORDER, RECURRENT, MODERATE (HCC): ICD-10-CM

## 2020-06-22 PROCEDURE — 99214 OFFICE O/P EST MOD 30 MIN: CPT | Performed by: NURSE PRACTITIONER

## 2020-06-22 RX ORDER — LITHIUM CARBONATE 300 MG/1
CAPSULE ORAL
Qty: 180 CAPSULE | Refills: 2 | Status: SHIPPED | OUTPATIENT
Start: 2020-06-22 | End: 2021-03-09

## 2020-06-22 RX ORDER — TRAZODONE HYDROCHLORIDE 100 MG/1
TABLET ORAL
Qty: 180 TABLET | Refills: 2 | Status: SHIPPED | OUTPATIENT
Start: 2020-06-22 | End: 2021-02-28

## 2020-06-22 RX ORDER — CLONAZEPAM 0.5 MG/1
0.5 TABLET ORAL 2 TIMES DAILY
Qty: 180 TABLET | Refills: 1 | Status: SHIPPED | OUTPATIENT
Start: 2020-06-22 | End: 2020-07-21

## 2020-06-22 RX ORDER — VENLAFAXINE HYDROCHLORIDE 75 MG/1
75 CAPSULE, EXTENDED RELEASE ORAL DAILY
Qty: 90 CAPSULE | Refills: 3 | Status: SHIPPED | OUTPATIENT
Start: 2020-06-22 | End: 2020-07-21

## 2020-06-22 RX ORDER — ALPRAZOLAM 0.5 MG/1
0.5 TABLET ORAL 2 TIMES DAILY PRN
Qty: 180 TABLET | Refills: 0 | Status: SHIPPED | OUTPATIENT
Start: 2020-06-22 | End: 2020-09-28

## 2020-06-30 ENCOUNTER — TELEPHONE (OUTPATIENT)
Dept: PSYCHIATRY | Facility: CLINIC | Age: 60
End: 2020-06-30

## 2020-06-30 NOTE — TELEPHONE ENCOUNTER
Rajat Robb was summoned for jury duty , she's requesting a letter excusing her based on her mental health   A Release Form was emailed to her today to complete and forward back to the office

## 2020-07-21 ENCOUNTER — TELEPHONE (OUTPATIENT)
Dept: PSYCHIATRY | Facility: CLINIC | Age: 60
End: 2020-07-21

## 2020-07-21 DIAGNOSIS — F31.9 BIPOLAR I DISORDER WITH DEPRESSION (HCC): ICD-10-CM

## 2020-07-21 DIAGNOSIS — F33.1 MAJOR DEPRESSIVE DISORDER, RECURRENT, MODERATE (HCC): ICD-10-CM

## 2020-07-21 RX ORDER — VENLAFAXINE HYDROCHLORIDE 75 MG/1
150 CAPSULE, EXTENDED RELEASE ORAL DAILY
Qty: 90 CAPSULE | Refills: 3
Start: 2020-07-21 | End: 2020-08-11 | Stop reason: SDUPTHER

## 2020-07-21 RX ORDER — CLONAZEPAM 0.5 MG/1
TABLET ORAL
Qty: 180 TABLET | Refills: 1
Start: 2020-07-21 | End: 2020-08-11 | Stop reason: SDUPTHER

## 2020-07-21 NOTE — TELEPHONE ENCOUNTER
Spoke with patient at this time, she is not experiencing any suicidal ideation  If this changes and she does, she know she needs to go to the emergency room immediately  I did increase Effexor 150 mg daily and Klonopin 0 5 mg t i d  To q i d  Jocelynn Mccormack She will call me this Thursday to let me know how she is doing  Again, she is aware that if she becomes more emotional or she experiences any suicidal thoughts, she is to go immediately to the emergency room  ----- Message from Angela Wong sent at 7/21/2020 11:57 AM EDT -----  Keisha Starkey left a voicemail this morning, she has not been felling well "emotionally" this week

## 2020-07-22 ENCOUNTER — APPOINTMENT (OUTPATIENT)
Dept: LAB | Facility: CLINIC | Age: 60
End: 2020-07-22
Payer: COMMERCIAL

## 2020-07-22 DIAGNOSIS — F31.9 BIPOLAR DISORDER WITH DEPRESSION (HCC): ICD-10-CM

## 2020-07-22 LAB
ANION GAP SERPL CALCULATED.3IONS-SCNC: 4 MMOL/L (ref 4–13)
BUN SERPL-MCNC: 10 MG/DL (ref 5–25)
CALCIUM SERPL-MCNC: 10.1 MG/DL (ref 8.3–10.1)
CHLORIDE SERPL-SCNC: 111 MMOL/L (ref 100–108)
CO2 SERPL-SCNC: 26 MMOL/L (ref 21–32)
CREAT SERPL-MCNC: 0.98 MG/DL (ref 0.6–1.3)
GFR SERPL CREATININE-BSD FRML MDRD: 62 ML/MIN/1.73SQ M
GLUCOSE P FAST SERPL-MCNC: 119 MG/DL (ref 65–99)
LITHIUM SERPL-SCNC: 0.6 MMOL/L (ref 0.5–1)
POTASSIUM SERPL-SCNC: 3.9 MMOL/L (ref 3.5–5.3)
SODIUM SERPL-SCNC: 141 MMOL/L (ref 136–145)
TSH SERPL DL<=0.05 MIU/L-ACNC: 1.25 UIU/ML (ref 0.36–3.74)

## 2020-07-22 PROCEDURE — 84443 ASSAY THYROID STIM HORMONE: CPT

## 2020-07-22 PROCEDURE — 80178 ASSAY OF LITHIUM: CPT

## 2020-07-22 PROCEDURE — 36415 COLL VENOUS BLD VENIPUNCTURE: CPT

## 2020-07-22 PROCEDURE — 80048 BASIC METABOLIC PNL TOTAL CA: CPT

## 2020-08-10 ENCOUNTER — APPOINTMENT (OUTPATIENT)
Dept: RADIOLOGY | Facility: MEDICAL CENTER | Age: 60
End: 2020-08-10
Payer: COMMERCIAL

## 2020-08-10 ENCOUNTER — OFFICE VISIT (OUTPATIENT)
Dept: OBGYN CLINIC | Facility: MEDICAL CENTER | Age: 60
End: 2020-08-10
Payer: COMMERCIAL

## 2020-08-10 VITALS
BODY MASS INDEX: 42.47 KG/M2 | HEIGHT: 64 IN | SYSTOLIC BLOOD PRESSURE: 120 MMHG | TEMPERATURE: 98.6 F | HEART RATE: 97 BPM | DIASTOLIC BLOOD PRESSURE: 80 MMHG | WEIGHT: 248.8 LBS

## 2020-08-10 DIAGNOSIS — Z01.89 ENCOUNTER FOR LOWER EXTREMITY COMPARISON IMAGING STUDY: ICD-10-CM

## 2020-08-10 DIAGNOSIS — M17.11 PRIMARY OSTEOARTHRITIS OF RIGHT KNEE: Primary | ICD-10-CM

## 2020-08-10 DIAGNOSIS — M25.551 PAIN IN RIGHT HIP: ICD-10-CM

## 2020-08-10 DIAGNOSIS — M70.61 TROCHANTERIC BURSITIS OF RIGHT HIP: ICD-10-CM

## 2020-08-10 DIAGNOSIS — M17.11 PRIMARY OSTEOARTHRITIS OF RIGHT KNEE: ICD-10-CM

## 2020-08-10 PROCEDURE — 73502 X-RAY EXAM HIP UNI 2-3 VIEWS: CPT

## 2020-08-10 PROCEDURE — 73560 X-RAY EXAM OF KNEE 1 OR 2: CPT

## 2020-08-10 PROCEDURE — 99214 OFFICE O/P EST MOD 30 MIN: CPT | Performed by: ORTHOPAEDIC SURGERY

## 2020-08-10 PROCEDURE — 20610 DRAIN/INJ JOINT/BURSA W/O US: CPT | Performed by: ORTHOPAEDIC SURGERY

## 2020-08-10 PROCEDURE — 73564 X-RAY EXAM KNEE 4 OR MORE: CPT

## 2020-08-10 RX ORDER — LIDOCAINE HYDROCHLORIDE 10 MG/ML
2 INJECTION, SOLUTION INFILTRATION; PERINEURAL
Status: COMPLETED | OUTPATIENT
Start: 2020-08-10 | End: 2020-08-10

## 2020-08-10 RX ORDER — LIDOCAINE HYDROCHLORIDE 10 MG/ML
3 INJECTION, SOLUTION INFILTRATION; PERINEURAL
Status: COMPLETED | OUTPATIENT
Start: 2020-08-10 | End: 2020-08-10

## 2020-08-10 RX ORDER — METHYLPREDNISOLONE ACETATE 40 MG/ML
2 INJECTION, SUSPENSION INTRA-ARTICULAR; INTRALESIONAL; INTRAMUSCULAR; SOFT TISSUE
Status: COMPLETED | OUTPATIENT
Start: 2020-08-10 | End: 2020-08-10

## 2020-08-10 RX ORDER — METHYLPREDNISOLONE ACETATE 40 MG/ML
1 INJECTION, SUSPENSION INTRA-ARTICULAR; INTRALESIONAL; INTRAMUSCULAR; SOFT TISSUE
Status: COMPLETED | OUTPATIENT
Start: 2020-08-10 | End: 2020-08-10

## 2020-08-10 RX ADMIN — METHYLPREDNISOLONE ACETATE 2 ML: 40 INJECTION, SUSPENSION INTRA-ARTICULAR; INTRALESIONAL; INTRAMUSCULAR; SOFT TISSUE at 10:39

## 2020-08-10 RX ADMIN — METHYLPREDNISOLONE ACETATE 1 ML: 40 INJECTION, SUSPENSION INTRA-ARTICULAR; INTRALESIONAL; INTRAMUSCULAR; SOFT TISSUE at 11:20

## 2020-08-10 RX ADMIN — LIDOCAINE HYDROCHLORIDE 2 ML: 10 INJECTION, SOLUTION INFILTRATION; PERINEURAL at 11:20

## 2020-08-10 RX ADMIN — LIDOCAINE HYDROCHLORIDE 3 ML: 10 INJECTION, SOLUTION INFILTRATION; PERINEURAL at 10:39

## 2020-08-10 NOTE — PROGRESS NOTES
Assessment/Plan:  1  Primary osteoarthritis of right knee    2  Encounter for lower extremity comparison imaging study    3  Pain in right hip    4  Trochanteric bursitis of right hip      Orders Placed This Encounter   Procedures    Large joint arthrocentesis: R knee    Large joint arthrocentesis: R greater trochanteric bursa    XR knee 4+ vw right injury    XR knee 1 or 2 vw left    XR hip/pelv 2-3 vws right if performed    Ambulatory referral to Physical Therapy       · X-rays reviewed with patient  X-rays right hip demonstrate no degenerative changes and no acute deformities  X-rays right knee demonstrate moderate osteoarthritis  · Patient received right knee steroid injection and right greater trochanter bursa steroid injection in the office today  Tolerated the procedures well  Advised to apply ice to right knee and right hip and avoid strenuous activity for 1-2 days as needed  · Continue naproxen as needed for pain  · PT script provided for right hip, right IT band, and right knee  · Patient will call to fu in 6 weeks for appt when she knows her work schedule  Return if symptoms worsen or fail to improve  I answered all of the patient's questions during the visit and provided education of the patient's condition during the visit  The patient verbalized understanding of the information given and agrees with the plan  This note was dictated using CubeTree software  It may contain errors including improperly dictated words  Please contact physician directly for any questions  Subjective   Chief Complaint:   Chief Complaint   Patient presents with    Right Knee - Follow-up       HPI  Yvonne Jaffe is a 63 y o  female who presents for follow up for right knee pain and new right hip pain  Patient received right knee CSI on 8/8/19 and does not recall if she had relief or not  As of today patient reports excruciating pain on the medial aspect of the right knee   She feels that her knee is locked and ROM is limited due to pain  Patient is also complaining of right hip pain since January  Patient described pain as sharp on the lateral aspect of the hip  Pain is worsened with palpation or if she sleeps on her right side  She has difficulty getting in or out of her car due to hip pain  She feels that the pain radiates from the lateral hip to the lateral right knee  Patient saw her PCP in May who obtained right hip x-rays and prescribed oral prednisone and naproxen for right troch bursitis  Patient states she got temporary relief from the oral steroid but pain has since returned  Denies injury, numbness, or tingling  Review of Systems  ROS:    See HPI for musculoskeletal review  All other systems reviewed are negative     History:  Past Medical History:   Diagnosis Date    Anxiety     Arthritis     Back pain     Lower back    Bipolar 1 disorder (HCC)     Depression     Diverticulosis     Hip dysplasia     Bilateral    Hyperlipidemia     Knee pain, right     Obesity     Psoriasis     Along hairline      Stress incontinence      Past Surgical History:   Procedure Laterality Date    COLONOSCOPY      DILATION AND CURETTAGE OF UTERUS      One D&C and one D&E    ESOPHAGOGASTRODUODENOSCOPY      NASAL SEPTUM SURGERY      HI KNEE SCOPE,MED/LAT MENISECTOMY Right 9/27/2018    Procedure: ARTHROSCOPY KNEE; PARTIAL MEDIAL MENISECTOMY; CHONDROPLASTY;  Surgeon: Kris Zaragoza DO;  Location: [facility];  Service: Orthopedics    TONSILECTOMY AND ADNOIDECTOMY      TONSILLECTOMY       Social History   Social History     Substance and Sexual Activity   Alcohol Use Yes    Comment: 5 yearly     Social History     Substance and Sexual Activity   Drug Use No     Social History     Tobacco Use   Smoking Status Never Smoker   Smokeless Tobacco Never Used     Family History:   Family History   Problem Relation Age of Onset    Asthma Mother     No Known Problems Father     Rectal cancer Sister 62    No Known Problems Daughter     No Known Problems Maternal Grandmother     No Known Problems Maternal Grandfather     No Known Problems Paternal Grandmother     No Known Problems Paternal Grandfather     No Known Problems Paternal Aunt        Current Outpatient Medications on File Prior to Visit   Medication Sig Dispense Refill    acetaminophen (TYLENOL) 500 mg tablet Take 500 mg by mouth every 6 (six) hours as needed for mild pain      ALPRAZolam (XANAX) 0 5 mg tablet Take 1 tablet (0 5 mg total) by mouth 2 (two) times a day as needed for anxiety 180 tablet 0    aspirin (ECOTRIN) 325 mg EC tablet Take 1 tablet (325 mg total) by mouth 2 (two) times a day 28 tablet 0    clonazePAM (KlonoPIN) 0 5 mg tablet 1 TID- tablet 1    lithium carbonate 300 mg capsule 2  capsule 2    Multiple Vitamin (MULTIVITAMIN) capsule Take 1 capsule by mouth daily      traZODone (DESYREL) 100 mg tablet 1-2 tabs  tablet 2    venlafaxine (EFFEXOR-XR) 75 mg 24 hr capsule Take 2 capsules (150 mg total) by mouth daily 90 capsule 3     No current facility-administered medications on file prior to visit        Allergies   Allergen Reactions    Banana Hives     Itching and swelling     Crab (Diagnostic) Hives    Cleocin [Clindamycin]      Causes C Diff    Latex      Pt told not to be in contact with latex because of banana allergy    Penicillins Other (See Comments)     Causes vaginal itching and must take Diflucan with it        Objective     /80   Pulse 97   Temp 98 6 °F (37 °C)   Ht 5' 4" (1 626 m)   Wt 113 kg (248 lb 12 8 oz)   BMI 42 71 kg/m²      PE:  AAOx 3  WDWN  Hearing intact, no drainage from eyes  no audible wheezing  no abdominal distension  LE compartments soft, skin intact    Ortho Exam:  right Knee:   No erythema  no swelling  no effusion  no warmth  +TTP over medial joint line  AROM: 0- 95 with discomfort  Stable to varus/valgus stress    right Hip Exam  Palpation:  moderate tenderness at troch bursa    + TTP over greater trochanter   No TTP over SIJ  ROM:  Hip Flexion 95  Hip ER 30  Hip IR 45  Strength:  5/5 hip flexors and abductors  Stability:  No objective hip instability  Tests:  (+) MICHAEL  (-) Stielvis  (-) Marianne Pinto  (-) Straight leg raise    NV: sensation grossly intact L4, L5, S1, palpable pedal pulse      Imaging Studies: I have personally reviewed pertinent films in PACS  X-ray right knee: moderate osteoarthritis   X-ray right hip: no degenerative changes, no acute deformity    Large joint arthrocentesis: R knee  Date/Time: 8/10/2020 10:39 AM  Consent given by: patient  Site marked: site marked  Timeout: Immediately prior to procedure a time out was called to verify the correct patient, procedure, equipment, support staff and site/side marked as required   Supporting Documentation  Indications: pain   Procedure Details  Location: knee - R knee  Preparation: Patient was prepped and draped in the usual sterile fashion  Needle size: 22 G  Ultrasound guidance: no  Approach: anterolateral  Medications administered: 3 mL lidocaine 1 %; 2 mL methylPREDNISolone acetate 40 mg/mL    Patient tolerance: patient tolerated the procedure well with no immediate complications  Dressing:  Sterile dressing applied    Large joint arthrocentesis: R greater trochanteric bursa  Date/Time: 8/10/2020 11:20 AM  Consent given by: patient  Site marked: site marked  Timeout: Immediately prior to procedure a time out was called to verify the correct patient, procedure, equipment, support staff and site/side marked as required   Supporting Documentation  Indications: pain   Procedure Details  Location: hip - R greater trochanteric bursa  Preparation: Patient was prepped and draped in the usual sterile fashion  Needle size: 22 G  Ultrasound guidance: no  Approach: lateral  Medications administered: 2 mL lidocaine 1 %; 1 mL methylPREDNISolone acetate 40 mg/mL    Patient tolerance: patient tolerated the procedure well with no immediate complications  Dressing:  Sterile dressing applied

## 2020-08-11 DIAGNOSIS — F31.9 BIPOLAR I DISORDER WITH DEPRESSION (HCC): ICD-10-CM

## 2020-08-11 DIAGNOSIS — F33.1 MAJOR DEPRESSIVE DISORDER, RECURRENT, MODERATE (HCC): ICD-10-CM

## 2020-08-12 DIAGNOSIS — F31.9 BIPOLAR I DISORDER WITH DEPRESSION (HCC): ICD-10-CM

## 2020-08-12 DIAGNOSIS — F33.1 MAJOR DEPRESSIVE DISORDER, RECURRENT, MODERATE (HCC): ICD-10-CM

## 2020-08-12 RX ORDER — VENLAFAXINE HYDROCHLORIDE 75 MG/1
150 CAPSULE, EXTENDED RELEASE ORAL DAILY
Qty: 90 CAPSULE | Refills: 3 | Status: SHIPPED | OUTPATIENT
Start: 2020-08-12 | End: 2020-11-13

## 2020-08-12 RX ORDER — CLONAZEPAM 0.5 MG/1
TABLET ORAL
Qty: 180 TABLET | Refills: 1
Start: 2020-08-12 | End: 2020-08-12 | Stop reason: SDUPTHER

## 2020-08-12 RX ORDER — CLONAZEPAM 0.5 MG/1
TABLET ORAL
Qty: 180 TABLET | Refills: 1 | Status: SHIPPED | OUTPATIENT
Start: 2020-08-12 | End: 2020-12-09

## 2020-08-12 RX ORDER — VENLAFAXINE HYDROCHLORIDE 75 MG/1
150 CAPSULE, EXTENDED RELEASE ORAL DAILY
Qty: 90 CAPSULE | Refills: 3
Start: 2020-08-12 | End: 2020-08-12 | Stop reason: SDUPTHER

## 2020-08-26 NOTE — PROGRESS NOTES
PT Evaluation     Today's date: 2020  Patient name: Enrique Posada  : 1960  MRN: [id number]  Referring provider: Carlota Mendosa PA-C  Dx:   Encounter Diagnosis     ICD-10-CM    1  Primary osteoarthritis of right knee  M17 11    2  Trochanteric bursitis of right hip  M70 61                   Assessment  Assessment details: PT notes the patient with decrease ROM and strength t/o the right hip and knee with demonstration of antalgic gait pattern and balance leading to increase pain levels and functional limitations with need for course of skilled therapy for 6 weeks with focus on gait/balance, strengthening, manual therapy, analgesic modalities, and HEP  Impairments: abnormal gait, abnormal or restricted ROM, activity intolerance, impaired physical strength, lacks appropriate home exercise program and pain with function  Understanding of Dx/Px/POC: good   Prognosis: good    Goals  ST  Initiate HEP  2  Increase strength by 1-2 mm grades  3  Increase ROM by 25-50%   4  Decrease pain levels by 25-50%   LT  Demonstration of improve gait pattern and balance to good with static and dynamic activities  2  Decrease limitations with standing, walking, and stairs  3  Decrease limitations with lifting, carrying and ADL  4   DC with HEP    Plan  Plan details: PT notes review of POC and findings with patient who is in agreement with PT recommendations of course of skilled therapy     Patient would benefit from: PT eval and skilled physical therapy  Planned modality interventions: thermotherapy: hydrocollator packs  Planned therapy interventions: manual therapy, neuromuscular re-education, patient education, self care, strengthening, stretching, therapeutic exercise, home exercise program, graded exercise and flexibility  Frequency: 2x week  Duration in visits: 8  Duration in weeks: 4  Treatment plan discussed with: patient        Subjective Evaluation    History of Present Illness  Mechanism of injury: Patient reports development of right knee and hip pain several months after right knee arthroscopic repair  Patient reports she recently had analgesic injections in the right hip and knee by ortho MD about 3 weeks ago  Patient reports the injections have helped in the right hip by 90% but minimal change in status in the right knee  Patient feels her right knee locks leading to limitations with movement of the knee  Patient reports the symptoms are sharp in nature and linger in the joint leading to limitations with movement as well as pain in the mornings with difficulty with standing and walking activities  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 and reports significant PMH of chronic LBP and right knee meniscal tear with arthroscopic repair  Pain  Current pain ratin  At best pain ratin  At worst pain rating: 10  Location: Right knee   Quality: sharp and knife-like      Diagnostic Tests  X-ray: normal  Treatments  Current treatment: injection treatment, medication and physical therapy  Patient Goals  Patient goals for therapy: decreased pain, increased motion, increased strength and independence with ADLs/IADLs          Objective     Palpation     Right   Muscle spasm in the TFL  Tenderness of the TFL  Tenderness     Right Hip   Tenderness in the greater trochanter  Right Knee   Tenderness in the MCL (distal), MCL (proximal) and medial joint line       Additional Tenderness Details  PT notes + Noble compression over distal ITB right lateral knee     Neurological Testing     Reflexes   Left   Patellar (L4): normal (2+)  Achilles (S1): normal (2+)    Right   Patellar (L4): normal (2+)  Achilles (S1): normal (2+)    Active Range of Motion     Right Hip   Flexion: 41 degrees with pain  Extension: 4 degrees with pain  Abduction: 24 degrees with pain  External rotation (90/90): 24 degrees with pain  Internal rotation (90/90): 15 degrees with pain  Left Knee   Normal active range of motion    Right Knee   Normal active range of motion    Additional Active Range of Motion Details  PT notes the patient with decrease ROM and strength t/o the right hip and LE     Passive Range of Motion     Right Hip   Flexion: 55 degrees with pain  Extension: WFL  Abduction: WFL and with pain  External rotation (90/90): 26 degrees with pain  Internal rotation (90/90): 21 degrees with pain    Mobility   Patellar Mobility:   Left Knee   WFL: medial, lateral, superior and inferior  Right Knee   WFL: lateral  Hypomobile: medial, superior and inferior     Patellar Static Positioning   Left Knee: WFL  Right Knee: [facility]    Strength/Myotome Testing     Left Hip   Normal muscle strength    Right Hip   Planes of Motion   Flexion: 4  Extension: 4+  Abduction: 4-  Adduction: 5  External rotation: 4-  Internal rotation: 4-    Left Knee   Quadriceps contraction: good    Right Knee   Flexion: 4+  Extension: 4-  Quadriceps contraction: fair    Tests     Right Hip   Positive Anna and piriformis  Negative MICHAEL, FADIR and long sit  Modified Dangelo: Positive  90/90 SLR: Positive  Right Knee   Negative anterior Lachman, Apley's compression, lateral Aristeo, medial Aristeo, posterior Lachman, valgus stress test at 0 degrees and varus stress test at 0 degrees  Ambulation     Observational Gait   Gait: antalgic   Walking speed within functional limits  Decreased right stance time       Additional Observational Gait Details  PT notes decrease stance on the right LE with decrease push off on the right, minimal Trendelenburg on the right, and decrease stance on the right with rating of good with static and dynamic activities              Precautions:  Chronic LBP       Manuals 8/27          Graston to the right lateral thigh with sweeping, tracing/edging, and J turns  NV          STM to the right ITB proximal to distal  15 min                              Neuro Re-Ed Ther Ex          [address] [facility]           ITB stretch           Front and lateral step up           Step downs           Monster walk           Side step and squat           Hip AB/AD machine           Bridge with TB           Supine Tball ABD crunch           LTR with Tball           Bridge with Tball           HEP update/review           Ther Activity                              Gait Training                              Modalities          MHP to the right lateral hip and knee in seated  15 min

## 2020-08-27 ENCOUNTER — EVALUATION (OUTPATIENT)
Dept: PHYSICAL THERAPY | Facility: CLINIC | Age: 60
End: 2020-08-27
Payer: COMMERCIAL

## 2020-08-27 DIAGNOSIS — M17.11 PRIMARY OSTEOARTHRITIS OF RIGHT KNEE: Primary | ICD-10-CM

## 2020-08-27 DIAGNOSIS — M70.61 TROCHANTERIC BURSITIS OF RIGHT HIP: ICD-10-CM

## 2020-08-27 PROCEDURE — 97140 MANUAL THERAPY 1/> REGIONS: CPT | Performed by: PHYSICAL THERAPIST

## 2020-08-27 PROCEDURE — 97535 SELF CARE MNGMENT TRAINING: CPT | Performed by: PHYSICAL THERAPIST

## 2020-08-27 PROCEDURE — 97162 PT EVAL MOD COMPLEX 30 MIN: CPT | Performed by: PHYSICAL THERAPIST

## 2020-09-01 ENCOUNTER — OFFICE VISIT (OUTPATIENT)
Dept: PHYSICAL THERAPY | Facility: CLINIC | Age: 60
End: 2020-09-01
Payer: COMMERCIAL

## 2020-09-01 DIAGNOSIS — M17.11 PRIMARY OSTEOARTHRITIS OF RIGHT KNEE: Primary | ICD-10-CM

## 2020-09-01 DIAGNOSIS — M70.61 TROCHANTERIC BURSITIS OF RIGHT HIP: ICD-10-CM

## 2020-09-01 PROCEDURE — 97110 THERAPEUTIC EXERCISES: CPT

## 2020-09-01 PROCEDURE — 97140 MANUAL THERAPY 1/> REGIONS: CPT

## 2020-09-01 NOTE — PROGRESS NOTES
Daily Note     Today's date: 2020  Patient name: Emanuel Galindo  : 1960  MRN: [id number]  Referring provider: Ashley Bach PA-C  Dx:   Encounter Diagnosis     ICD-10-CM    1  Primary osteoarthritis of right knee  M17 11    2  Trochanteric bursitis of right hip  M70 61                   Subjective: Pt reports that she is doing about the same today with no new changes since IE  Notes that she has 0/10 pain upon presentation but notices increased pain when she lays on either hip at nighttime  Objective: See treatment diary below      Assessment: Tolerated treatment fair  Increased tenderness and TrP present over R lateral ITB with improvements made post manuals  She was able to perform all newly added exercises as well with fatigue and weakness present  Patient demonstrated fatigue post treatment, exhibited good technique with therapeutic exercises and would benefit from continued PT      Plan: Continue per plan of care        Precautions:  Chronic LBP       Manuals         Graston to the right lateral thigh with sweeping, tracing/edging, and J turns  NV  13'        STM to the right ITB proximal to distal  15 min  10'                            Neuro Re-Ed                                                                                Ther Ex          [address] [facility]   5'        ITB stretch   30"x3        Front and lateral step up   6" 20x ea        Step downs           Monster walk           Side step and squat           Hip AB/AD machine           Bridge with TB   rtb 2x10        Supine Tball ABD crunch   2x10        LTR with Tball   5"x10 ea        Bridge with Tball   2x10        HEP update/review   5 min        Ther Activity                              Gait Training                              Modalities          MHP to the right lateral hip and knee in seated  15 min 15 min

## 2020-09-03 ENCOUNTER — OFFICE VISIT (OUTPATIENT)
Dept: PHYSICAL THERAPY | Facility: CLINIC | Age: 60
End: 2020-09-03
Payer: COMMERCIAL

## 2020-09-03 DIAGNOSIS — M17.11 PRIMARY OSTEOARTHRITIS OF RIGHT KNEE: Primary | ICD-10-CM

## 2020-09-03 DIAGNOSIS — M70.61 TROCHANTERIC BURSITIS OF RIGHT HIP: ICD-10-CM

## 2020-09-03 PROCEDURE — 97140 MANUAL THERAPY 1/> REGIONS: CPT

## 2020-09-03 PROCEDURE — 97110 THERAPEUTIC EXERCISES: CPT

## 2020-09-03 NOTE — PROGRESS NOTES
Daily Note     Today's date: 9/3/2020  Patient name: Meenakshi Rabago  : 1960  MRN: [id number]  Referring provider: Reza Moses PA-C  Dx:   Encounter Diagnosis     ICD-10-CM    1  Primary osteoarthritis of right knee  M17 11    2  Trochanteric bursitis of right hip  M70 61                   Subjective: patient stated the side of her hip hurts mostly at night when she is laying on her side  Objective: See treatment diary below      Assessment: Patient tolerated treatment well  No exacerbation of sx with program  Patient able to complete all tasks assigned with good execution  Patient would benefit from continued therapy to decrease pain and increase strength and flexibility to improve LOF  Plan: Continue per plan of care  Progress treatment as tolerated         Precautions:  Chronic LBP       Manuals 8/27  9/1 9/3       Graston to the right lateral thigh with sweeping, tracing/edging, and J turns  NV  13' 10 min       STM to the right ITB proximal to distal  15 min  10' 5 min                            Neuro Re-Ed                                                                                Ther Ex          [facility]   5' 10 min L2       ITB stretch   30"x3 30" x3       Front and lateral step up   6" 20x ea 6" step   20x each       Step downs           Monster walk    4x 10 feet  Red Tband       Side step and squat           Hip AB/AD machine           Bridge with TB   rtb 2x10 Red tband        Supine Tball ABD crunch   2x10 2x10       LTR with Tball   5"x10 ea 10x 5" hold       Bridge with Tball   2x10 2x10       HEP update/review   5 min        Ther Activity                              Gait Training                              Modalities          MHP to the right lateral hip and knee in seated  15 min 15 min 15 min

## 2020-09-08 ENCOUNTER — OFFICE VISIT (OUTPATIENT)
Dept: PSYCHIATRY | Facility: CLINIC | Age: 60
End: 2020-09-08
Payer: COMMERCIAL

## 2020-09-08 ENCOUNTER — OFFICE VISIT (OUTPATIENT)
Dept: PHYSICAL THERAPY | Facility: CLINIC | Age: 60
End: 2020-09-08
Payer: COMMERCIAL

## 2020-09-08 DIAGNOSIS — M70.61 TROCHANTERIC BURSITIS OF RIGHT HIP: ICD-10-CM

## 2020-09-08 DIAGNOSIS — R37 SEXUAL DYSFUNCTION: Primary | ICD-10-CM

## 2020-09-08 DIAGNOSIS — M17.11 PRIMARY OSTEOARTHRITIS OF RIGHT KNEE: Primary | ICD-10-CM

## 2020-09-08 DIAGNOSIS — F33.1 MAJOR DEPRESSIVE DISORDER, RECURRENT, MODERATE (HCC): ICD-10-CM

## 2020-09-08 PROCEDURE — 97112 NEUROMUSCULAR REEDUCATION: CPT | Performed by: PHYSICAL THERAPIST

## 2020-09-08 PROCEDURE — 99214 OFFICE O/P EST MOD 30 MIN: CPT | Performed by: NURSE PRACTITIONER

## 2020-09-08 PROCEDURE — 97140 MANUAL THERAPY 1/> REGIONS: CPT | Performed by: PHYSICAL THERAPIST

## 2020-09-08 RX ORDER — BUPROPION HYDROCHLORIDE 75 MG/1
75 TABLET ORAL DAILY
Qty: 30 TABLET | Refills: 2 | Status: SHIPPED | OUTPATIENT
Start: 2020-09-08 | End: 2020-12-08 | Stop reason: SDUPTHER

## 2020-09-08 NOTE — PSYCH
PROGRESS NOTE        [facility]      Name and Date of Birth:  Lester Ramirezsins 63 y o  1960    Date of Visit: 09/08/20    SUBJECTIVE:    Arya Stephenson presents today for treatment of major depressive disorder, bipolar depressive disorder and generalized anxiety disorder  She does have some concerns today  Psychiatrically, she is doing better with the increase of Effexor  Unfortunately, with the increase of Effexor, it has lowered her sexual drive  She is having some issues with sexual dysfunction  We did discuss options and we are going to add low-dose Wellbutrin at 75 mg a day which can also help her depression, help upset some appetite cravings which has resulted in some weight gain for her  Since being on lithium, over the course of many years, she has gained a significant amount of weight  Did discuss options such as reducing the lithium and adding in a very low-dose mood stabilizer but for now, she would like to wait and see how the Wellbutrin helps her sexual dysfunction  I am also encouraging her to become more active and we did talk about some dietary changes  Will have her return to see me in 1 month for follow-up  She denies suicidal ideation, intent or plan at present, has no suicidal ideation, intent or plan at present  She denies any auditory hallucinations and visual hallucinations, denies any other delusional thinking, denies any delusional thinking  She denies any side effects from medications      HPI ROS Appetite Changes and Sleep: normal appetite, normal sleep    Review Of Systems:      Constitutional Negative   ENT Negative   Cardiovascular Negative   Respiratory As Noted in HPI   Gastrointestinal Negative   Genitourinary Negative   Musculoskeletal Negative   Integumentary Negative   Neurological Negative   Endocrine Negative   Other Symptoms Negative and None       Laboratory Results: No results found for this or any previous visit      Substance Abuse History:    Social History     Substance and Sexual Activity   Drug Use No       Family Psychiatric History:     Family History   Problem Relation Age of Onset    Asthma Mother     No Known Problems Father     Rectal cancer Sister 62    No Known Problems Daughter     No Known Problems Maternal Grandmother     No Known Problems Maternal Grandfather     No Known Problems Paternal Grandmother     No Known Problems Paternal Grandfather     No Known Problems Paternal Aunt        The following portions of the patient's history were reviewed and updated as appropriate: past family history, past medical history, past social history, past surgical history and problem list     Social History     Socioeconomic History    Marital status: /Civil Union     Spouse name: Not on file    Number of children: Not on file    Years of education: Not on file    Highest education level: Not on file   Occupational History    Not on file   Social Needs    Financial resource strain: Not on file    Food insecurity     Worry: Not on file     Inability: Not on file   [employer] needs     Medical: Not on file     Non-medical: Not on file   Tobacco Use    Smoking status: Never Smoker    Smokeless tobacco: Never Used   Substance and Sexual Activity    Alcohol use: Yes     Comment: 5 yearly    Drug use: No    Sexual activity: Not on file   Lifestyle    Physical activity     Days per week: Not on file     Minutes per session: Not on file    Stress: Not on file   Relationships    Social connections     Talks on phone: Not on file     Gets together: Not on file     Attends Voodoo service: Not on file     Active member of club or organization: Not on file     Attends meetings of clubs or organizations: Not on file     Relationship status: Not on file    Intimate partner violence     Fear of current or ex partner: Not on file     Emotionally abused: Not on file     Physically abused: Not on file     Forced sexual activity: Not on file   Other Topics Concern    Not on file   Social History Narrative    Not on file     Social History     Social History Narrative    Not on file        Social History     Tobacco History     Smoking Status  Never Smoker    Smokeless Tobacco Use  Never Used          Alcohol History     Alcohol Use Status  Yes Comment  5 yearly          Drug Use     Drug Use Status  No          Sexual Activity     Sexually Active  Not Asked          Activities of Daily Living    Not Asked                     OBJECTIVE:     Mental Status Evaluation:    Appearance age appropriate, casually dressed   Behavior pleasant, cooperative   Speech normal volume, normal pitch   Mood Improved   Affect brighter   Thought Processes logical   Associations intact associations   Thought Content Normal   Perceptual Disturbances: None   Abnormal Thoughts  Risk Potential Suicidal ideation - None  Homicidal ideation - None  Potential for aggression - No   Orientation oriented to person, place, time/date and situation   Memory recent and remote memory grossly intact   Cosciousness alert and awake   Attention Span attention span and concentration are age appropriate   Intellect Appears to be of Average Intelligence   Insight Good   Judgement Good   Muscle Strength and  Gait muscle strength and tone were normal   Language no difficulty naming common objects   Fund of Knowledge displays adequate knowledge of current events   Pain None   Pain Scale 0       Assessment/Plan:       Diagnoses and all orders for this visit:    Sexual dysfunction  -     buPROPion (WELLBUTRIN) 75 mg tablet; Take 1 tablet (75 mg total) by mouth daily    Major depressive disorder, recurrent, moderate (HCC)          Treatment Recommendations/Precautions:    Continue current medications:   Add Wellbutrin 75 mg daily to help with depression and sexual dysfunction  Continue Effexor XR 75 mg taking 2 capsules daily for 150 mg a day  Continue trazodone 100 mg HS  Continue lithium carbonate 600 mg HS  Consider reduction of lithium and add an alternative mood stabilizer secondary to the concerns of weight gain  Continue Klonopin 0 5 mg patient may take 3 times a day to 4 times a day as needed for anxiety  Follow-up in 4 weeks    Risks/Benefits      Risks, Benefits And Possible Side Effects Of Medications:    Risks, benefits, and possible side effects of medications explained to patient and patient verbalizes understanding and agreement for treatment  Controlled Medication Discussion:  No history of any overuse or abuse of controlled substances          Psychotherapy Provided:     Individual psychotherapy provided: No

## 2020-09-08 NOTE — PROGRESS NOTES
Daily Note     Today's date: 2020  Patient name: Naveed Gomez  : 1960  MRN: [id number]  Referring provider: Nolberto Joshi PA-C  Dx:   Encounter Diagnosis     ICD-10-CM    1  Primary osteoarthritis of right knee  M17 11    2  Trochanteric bursitis of right hip  M70 61                   Subjective:  Patient reports pain levels overall have decreased since the start of therapy  Patient reports 3/10 pain levels in the right hip t/o the session  Objective: See treatment diary below      Assessment: Tolerated treatment well  PT notes continuation of progression of TE program with focus on gait/balance and manual therapy to decrease pain levels and improve functional limitations to meet therapy goals  PT notes the patient with continuation of decrease ROM and strength t/o the right hip and LE with need for continuation of skilled therapy  Plan: Continue per plan of care        Precautions:  Chronic LBP       Manuals 8/27  9/1 9/3 9/8      Graston to the right lateral thigh with sweeping, tracing/edging, and J turns  NV  13' 10 min 10 min       STM to the right ITB proximal to distal  15 min  10' 5 min  5 min                           Neuro Re-Ed                                                                                Ther Ex          [facility]   5' 10 min L2 10 min L3       ITB stretch   30"x3 30" x3 HEP       Front and lateral step up   6" 20x ea 6" step   20x each 2x10 Bilat   6" step       Step downs           Monster walk    4x 10 feet  Red Tband 4x10 Feet   Green       Side step and squat     4x10   Feet Green       Hip AB/AD machine           Bridge with TB   rtb 2x10 Red tband  2x10   Red TB       Supine Tball ABD crunch   2x10 2x10 2x10   3" Hold       LTR with Tball   5"x10 ea 10x 5" hold 10x5" Hold   Bilat       S/L Clamshells     15x Red   Right Only       Bridge with Tball   2x10 2x10 2x10   3" Hold       HEP update/review   5 min        Ther Activity                              Gait Training                              Modalities          MHP to the right lateral hip and knee in seated  15 min 15 min 15 min 15 min

## 2020-09-10 ENCOUNTER — OFFICE VISIT (OUTPATIENT)
Dept: PHYSICAL THERAPY | Facility: CLINIC | Age: 60
End: 2020-09-10
Payer: COMMERCIAL

## 2020-09-10 DIAGNOSIS — M70.61 TROCHANTERIC BURSITIS OF RIGHT HIP: ICD-10-CM

## 2020-09-10 DIAGNOSIS — M17.11 PRIMARY OSTEOARTHRITIS OF RIGHT KNEE: Primary | ICD-10-CM

## 2020-09-10 PROCEDURE — 97112 NEUROMUSCULAR REEDUCATION: CPT | Performed by: PHYSICAL THERAPIST

## 2020-09-10 PROCEDURE — 97140 MANUAL THERAPY 1/> REGIONS: CPT | Performed by: PHYSICAL THERAPIST

## 2020-09-10 NOTE — PROGRESS NOTES
Daily Note     Today's date: 9/10/2020  Patient name: Sanchez Nick  : 1960  MRN: [id number]  Referring provider: Elsi Moreira PA-C  Dx:   Encounter Diagnosis     ICD-10-CM    1  Primary osteoarthritis of right knee  M17 11    2  Trochanteric bursitis of right hip  M70 61                   Subjective:  Patient reports having a rough night with increase medial right knee pain with frequent sleep disturbances  Patient reports the outer thigh has been feeling better with overall decrease pain levels to touch  Objective: See treatment diary below      Assessment: Tolerated treatment fair  PT notes continuation of progression of TE program with focus on gait/balance and manual therapy to decrease pain levels and improve functional limitations to meet therapy goals  PT notes the patient continuation of decrease ROM and strength t/o the right hip and LE with need for continuation of skilled therapy  Plan: Continue per plan of care        Precautions:  Chronic LBP       Manuals 8/27  9/1 9/3 9/8 9/10      Graston to the right lateral thigh with sweeping, tracing/edging, and J turns  NV  13' 10 min 10 min  10 min      STM to the right ITB proximal to distal  15 min  10' 5 min  5 min  5 min                          Neuro Re-Ed          Hip AB/AD machine      2x10 Each   45#/ABD  30#/ADD                                                                 Ther Ex          [facility]   5' 10 min L2 10 min L3  10 min L4      ITB stretch   30"x3 30" x3 HEP       Front and lateral step up   6" 20x ea 6" step   20x each 2x10 Bilat   6" step  2x10 Bilat  6" step      Step downs           Monster walk    4x 10 feet  Red Tband 4x10 Feet   Green  6x10 Feet   Green      Side step and squat     4x10   Feet Green  6x10 Feet   Green      Hip AB/AD machine           Bridge with TB   rtb 2x10 Red tband  2x10   Red TB  NT     Supine Tball ABD crunch   2x10 2x10 2x10   3" Hold  2x10   3" Hold      LTR with Tball   5"x10 ea 10x 5" hold 10x5" Hold   Bilat  10x5" Hold Bilat      S/L Clamshells     15x Red   Right Only  NT     Bridge with Tball   2x10 2x10 2x10   3" Hold  NT     HEP update/review   5 min        Ther Activity                              Gait Training                              Modalities          MHP to the right lateral hip and knee in seated  15 min 15 min 15 min 15 min  15 min

## 2020-09-15 ENCOUNTER — OFFICE VISIT (OUTPATIENT)
Dept: PHYSICAL THERAPY | Facility: CLINIC | Age: 60
End: 2020-09-15
Payer: COMMERCIAL

## 2020-09-15 DIAGNOSIS — M70.61 TROCHANTERIC BURSITIS OF RIGHT HIP: ICD-10-CM

## 2020-09-15 DIAGNOSIS — M17.11 PRIMARY OSTEOARTHRITIS OF RIGHT KNEE: Primary | ICD-10-CM

## 2020-09-15 PROCEDURE — 97112 NEUROMUSCULAR REEDUCATION: CPT | Performed by: PHYSICAL THERAPIST

## 2020-09-15 PROCEDURE — 97110 THERAPEUTIC EXERCISES: CPT | Performed by: PHYSICAL THERAPIST

## 2020-09-15 PROCEDURE — 97140 MANUAL THERAPY 1/> REGIONS: CPT | Performed by: PHYSICAL THERAPIST

## 2020-09-15 NOTE — PROGRESS NOTES
Daily Note     Today's date: 9/15/2020  Patient name: Sonia Queen  : 1960  MRN: [id number]  Referring provider: Félix Duarte PA-C  Dx:   Encounter Diagnosis     ICD-10-CM    1  Primary osteoarthritis of right knee  M17 11    2  Trochanteric bursitis of right hip  M70 61                   Subjective:  Patient reports having a bad night with increase pain levels to 7/10 level but states the symptoms have calmed down today to 3/10 level  Objective: See treatment diary below      Assessment: Tolerated treatment well  PT notes continuation of progression of TE program with focus on gait/balance and manual therapy to decrease pain levels and improve functional limitations to meet therapy goals  PT notes the patient with continuation of decrease ROM and strength t/o the right hip and LE with need for continuation of skilled therapy  Plan: Continue per plan of care        Precautions:  Chronic LBP       Manuals 8/27  9/1 9/3 9/8 9/10  9/15    Graston to the right lateral thigh with sweeping, tracing/edging, and J turns  NV  13' 10 min 10 min  10 min  10 min     STM to the right ITB proximal to distal  15 min  10' 5 min  5 min  5 min  5 min                         Neuro Re-Ed          Hip AB/AD machine      2x10 Each   45#/ABD  30#/ADD 2x10   Each   45#/ABD  30#/ADD                                                                Ther Ex          [address] [facility]   5' 10 min L2 10 min L3  10 min L4  10 min L5     ITB stretch   30"x3 30" x3 HEP       Front and lateral step up   6" 20x ea 6" step   20x each 2x10 Bilat   6" step  2x10 Bilat  6" step  2x10 Bilat  6" step    Step downs           Monster walk    4x 10 feet  Red Tband 4x10 Feet   Green  6x10 Feet   Green  8x10 Feet   Green     Side step and squat     4x10   Feet Green  6x10 Feet   Green  8x10 Feet   Green     Hip AB/AD machine           Bridge with TB   rtb 2x10 Red tband  2x10   Red TB  NT NT    Supine Tball ABD crunch   2x10 2x10 2x10   3" Hold  2x10   3" Hold  2x10   3" Hold     LTR with Tball   5"x10 ea 10x 5" hold 10x5" Hold   Bilat  10x5" Hold Bilat  10x5" Hold Bilat    S/L Clamshells     15x Red   Right Only  NT NT    Bridge with Tball   2x10 2x10 2x10   3" Hold  NT 2x10  5" Hold     HEP update/review   5 min        Ther Activity                              Gait Training                              Modalities          MHP to the right lateral hip and knee in seated  15 min 15 min 15 min 15 min  15 min  15 min

## 2020-09-18 ENCOUNTER — EVALUATION (OUTPATIENT)
Dept: PHYSICAL THERAPY | Facility: CLINIC | Age: 60
End: 2020-09-18
Payer: COMMERCIAL

## 2020-09-18 DIAGNOSIS — M70.61 TROCHANTERIC BURSITIS OF RIGHT HIP: ICD-10-CM

## 2020-09-18 DIAGNOSIS — M17.11 PRIMARY OSTEOARTHRITIS OF RIGHT KNEE: Primary | ICD-10-CM

## 2020-09-18 PROCEDURE — 97535 SELF CARE MNGMENT TRAINING: CPT | Performed by: PHYSICAL THERAPIST

## 2020-09-18 PROCEDURE — 97110 THERAPEUTIC EXERCISES: CPT | Performed by: PHYSICAL THERAPIST

## 2020-09-18 PROCEDURE — 97140 MANUAL THERAPY 1/> REGIONS: CPT | Performed by: PHYSICAL THERAPIST

## 2020-09-18 NOTE — PROGRESS NOTES
PT Re-Evaluation     Today's date: 2020  Patient name: Monet Hill  : 1960  MRN: [id number]  Referring provider: Christina Alfredo PA-C  Dx:   Encounter Diagnosis     ICD-10-CM    1  Primary osteoarthritis of right knee  M17 11    2  Trochanteric bursitis of right hip  M70 61                   Assessment  Assessment details: PT notes the patient with continuation of decrease ROM and strength t/o the right hip and knee with demonstration of antalgic gait pattern and balance leading to increase pain levels and functional limitations with need for continuation of skilled therapy for 4 weeks with focus on gait/balance, strengthening, manual therapy, analgesic modalities, and update/review of HEP  Impairments: abnormal gait, abnormal or restricted ROM, activity intolerance, impaired physical strength, lacks appropriate home exercise program and pain with function  Understanding of Dx/Px/POC: good   Prognosis: good    Goals  ST  Initiate HEP-MET  2  Increase strength by 1-2 mm grades-Partial MET  3  Increase ROM by 25-50%-MET   4  Decrease pain levels by 25-50%-Partial MET   LT  Demonstration of improve gait pattern and balance to good with static and dynamic activities-Partial MET  2  Decrease limitations with standing, walking, and stairs-Partial MET  3  Decrease limitations with lifting, carrying and ADL-Partial MET  4   DC with HEP-Progressing     Plan  Plan details: PT notes review of POC and findings with patient who is in agreement with PT recommendations of continuation of skilled therapy     Patient would benefit from: PT eval and skilled physical therapy  Planned modality interventions: thermotherapy: hydrocollator packs  Planned therapy interventions: manual therapy, neuromuscular re-education, patient education, self care, strengthening, stretching, therapeutic exercise, home exercise program, graded exercise and flexibility  Frequency: 2x week  Duration in visits: 8  Duration in weeks: 4  Treatment plan discussed with: patient        Subjective Evaluation    History of Present Illness  Mechanism of injury: Patient reports development of right knee and hip pain several months after right knee arthroscopic repair  Patient reports she recently had analgesic injections in the right hip and knee by ortho MD about 3 weeks ago  Patient reports the injections have helped in the right hip by 90% but minimal change in status in the right knee  Patient feels her right knee locks leading to limitations with movement of the knee  Patient reports the symptoms are sharp in nature and linger in the joint leading to limitations with movement as well as pain in the mornings with difficulty with standing and walking activities  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 and reports significant PMH of chronic LBP and right knee meniscal tear with arthroscopic repair  Presently the patient has attended 7 sessions of skilled therapy and feels 20-25% improvement since the start of therapy  Patient reports the hip pain is down but continuation of inner knee pain with limitations with standing, walking, sleep and ADL  Patient feels she needs more therapy to continue to get stronger and decrease pain in the LE  Pain  Current pain ratin  At best pain ratin  At worst pain ratin  Location: Right knee   Quality: sharp and knife-like  Progression: improved      Diagnostic Tests  X-ray: normal  Treatments  Current treatment: injection treatment, medication and physical therapy  Patient Goals  Patient goals for therapy: decreased pain, increased motion, increased strength and independence with ADLs/IADLs          Objective     Palpation     Right   Muscle spasm in the piriformis and TFL  Tenderness of the piriformis and TFL  Tenderness     Right Hip   Tenderness in the greater trochanter       Right Knee   Tenderness in the MCL (distal), MCL (proximal) and medial joint line  Neurological Testing     Reflexes   Left   Patellar (L4): normal (2+)  Achilles (S1): normal (2+)    Right   Patellar (L4): normal (2+)  Achilles (S1): normal (2+)    Active Range of Motion     Right Hip   Flexion: 55 degrees with pain  Extension: 10 degrees with pain  Abduction: 27 degrees with pain  External rotation (90/90): 25 degrees with pain  Internal rotation (90/90): 16 degrees with pain  Left Knee   Normal active range of motion    Right Knee   Normal active range of motion    Additional Active Range of Motion Details  PT notes the patient with decrease ROM and strength t/o the right hip and LE     Passive Range of Motion     Right Hip   Flexion: 61 degrees with pain  Extension: WFL  Abduction: WFL and with pain  External rotation (90/90): 31 degrees with pain  Internal rotation (90/90): 21 degrees with pain    Mobility   Patellar Mobility:   Left Knee   WFL: medial, lateral, superior and inferior  Right Knee   WFL: medial and lateral  Hypomobile: superior and inferior     Patellar Static Positioning   Left Knee: WFL  Right Knee: [facility]    Strength/Myotome Testing     Left Hip   Normal muscle strength    Right Hip   Planes of Motion   Flexion: 4  Extension: 4+  Abduction: 4  Adduction: 5  External rotation: 4  Internal rotation: 4    Left Knee   Quadriceps contraction: good    Right Knee   Flexion: 4+  Extension: 4  Quadriceps contraction: fair    Tests     Right Hip   Positive Anna and piriformis  Negative MICHAEL, FADIR and long sit  Modified Dangelo: Positive  90/90 SLR: Positive  Right Knee   Negative anterior Lachman, Apley's compression, lateral Aristeo, medial Aristeo, posterior Lachman, valgus stress test at 0 degrees and varus stress test at 0 degrees  Ambulation     Observational Gait   Gait: antalgic   Walking speed within functional limits  Decreased right stance time       Additional Observational Gait Details  PT notes decrease stance on the right LE with decrease push off on the right, minimal Trendelenburg on the right, and decrease stance on the right with rating of good with static and dynamic activities              Precautions:  Chronic LBP     Manuals 8/27  9/1 9/3 9/8 9/10  9/15 9/18   Graston to the right lateral thigh with sweeping, tracing/edging, and J turns  NV  13' 10 min 10 min  10 min  10 min  NT    STM to the right ITB proximal to distal  15 min  10' 5 min  5 min  5 min  5 min  5 mi n   Right hip HS, piriformis, gastroc, ITB, quad and manual hip traction        10 min              Neuro Re-Ed          Hip AB/AD machine      2x10 Each   45#/ABD  30#/ADD 2x10   Each   45#/ABD  30#/ADD                                                                Ther Ex          [address] [facility]   5' 10 min L2 10 min L3  10 min L4  10 min L5  10 min L3    ITB stretch   30"x3 30" x3 HEP       Front and lateral step up   6" 20x ea 6" step   20x each 2x10 Bilat   6" step  2x10 Bilat  6" step  2x10 Bilat  6" step    Step downs           Monster walk    4x 10 feet  Red Tband 4x10 Feet   Green  6x10 Feet   Green  8x10 Feet   Green     Side step and squat     4x10   Feet Green  6x10 Feet   Green  8x10 Feet   Green     Hip AB/AD machine           Bridge with TB   rtb 2x10 Red tband  2x10   Red TB  NT NT    Supine Tball ABD crunch   2x10 2x10 2x10   3" Hold  2x10   3" Hold  2x10   3" Hold     LTR with Tball   5"x10 ea 10x 5" hold 10x5" Hold   Bilat  10x5" Hold Bilat  10x5" Hold Bilat    S/L Clamshells     15x Red   Right Only  NT NT    Bridge with Tball   2x10 2x10 2x10   3" Hold  NT 2x10  5" Hold     HEP update/review   5 min     10 min    Ther Activity                              Gait Training                              Modalities          MHP to the right lateral hip and knee in seated  15 min 15 min 15 min 15 min  15 min  15 min  15 min

## 2020-09-21 ENCOUNTER — OFFICE VISIT (OUTPATIENT)
Dept: PHYSICAL THERAPY | Facility: CLINIC | Age: 60
End: 2020-09-21
Payer: COMMERCIAL

## 2020-09-21 DIAGNOSIS — M17.11 PRIMARY OSTEOARTHRITIS OF RIGHT KNEE: Primary | ICD-10-CM

## 2020-09-21 DIAGNOSIS — M70.61 TROCHANTERIC BURSITIS OF RIGHT HIP: ICD-10-CM

## 2020-09-21 PROCEDURE — 97110 THERAPEUTIC EXERCISES: CPT

## 2020-09-21 PROCEDURE — 97140 MANUAL THERAPY 1/> REGIONS: CPT

## 2020-09-21 NOTE — PROGRESS NOTES
Daily Note     Today's date: 2020  Patient name: Shakeel Rand  : 1960  MRN: [id number]  Referring provider: Morris Branch PA-C  Dx:   Encounter Diagnosis     ICD-10-CM    1  Primary osteoarthritis of right knee  M17 11    2  Trochanteric bursitis of right hip  M70 61                   Subjective: patient denied any pain today or over the weekend       Objective: See treatment diary below      Assessment: resumed exercise today but not full program as not to exacerbate sx  Patient tender to light palpation to IT band with gentle stroking from proximal to distal patient tolerated exercise program well  Will continue to monitor pain levels and progress as able  Plan: Continue per plan of care  Progress treatment as tolerated  Attempt to resume full program next visit        Precautions:  Chronic LBP     Manuals 9/21   9/3 9/8 9/10  9/15 9/18   Graston to the right lateral thigh with sweeping, tracing/edging, and J turns  NT  10 min 10 min  10 min  10 min  NT    STM to the right ITB proximal to distal  5 min   5 min  5 min  5 min  5 min  5 mi n   Right hip HS, piriformis, gastroc, ITB, quad and manual hip traction  10 min       10 min              Neuro Re-Ed          Hip AB/AD machine      2x10 Each   45#/ABD  30#/ADD 2x10   Each   45#/ABD  30#/ADD                                                                Ther Ex          SRC  10 min  L3   10 min L2 10 min L3  10 min L4  10 min L5  10 min L3    ITB stretch    30" x3 HEP       Front and lateral step up  2x10  bilat  6" step  6" step   20x each 2x10 Bilat   6" step  2x10 Bilat  6" step  2x10 Bilat  6" step    Step downs           Monster walk  6x10  Feet  Green   4x 10 feet  Red Tband 4x10 Feet   Green  6x10 Feet   Green  8x10 Feet   Green     Side step and squat  6x 10 feet  green   4x10   Feet Green  6x10 Feet   Green  8x10 Feet   Green     Hip AB/AD machine           Bridge with TB    Red tband  2x10   Red TB  NT NT    Supine Tball ABD crunch 2x10  3" hold  2x10 2x10   3" Hold  2x10   3" Hold  2x10   3" Hold     LTR with Tball  2x10  5" hold  10x 5" hold 10x5" Hold   Bilat  10x5" Hold Bilat  10x5" Hold Bilat    S/L Clamshells     15x Red   Right Only  NT NT    Bridge with Tball  2x10  3" hold   2x10 2x10   3" Hold  NT 2x10  5" Hold     HEP update/review        10 min    Ther Activity                              Gait Training                              Modalities          MHP to the right lateral hip and knee in seated  15 min  15 min 15 min  15 min  15 min  15 min

## 2020-09-23 ENCOUNTER — OFFICE VISIT (OUTPATIENT)
Dept: PHYSICAL THERAPY | Facility: CLINIC | Age: 60
End: 2020-09-23
Payer: COMMERCIAL

## 2020-09-23 DIAGNOSIS — M70.61 TROCHANTERIC BURSITIS OF RIGHT HIP: ICD-10-CM

## 2020-09-23 DIAGNOSIS — M17.11 PRIMARY OSTEOARTHRITIS OF RIGHT KNEE: Primary | ICD-10-CM

## 2020-09-23 PROCEDURE — 97110 THERAPEUTIC EXERCISES: CPT

## 2020-09-23 PROCEDURE — 97140 MANUAL THERAPY 1/> REGIONS: CPT

## 2020-09-23 NOTE — PROGRESS NOTES
Daily Note     Today's date: 2020  Patient name: Gigi Ford  : 1960  MRN: [id number]  Referring provider: Lynder Kehr, PA-C  Dx:   Encounter Diagnosis     ICD-10-CM    1  Primary osteoarthritis of right knee  M17 11    2  Trochanteric bursitis of right hip  M70 61                   Subjective: no complaints of pain today  Objective: See treatment diary below      Assessment: patient able to resume full treatment program after modifying treatment for several visits  Still holding Graston at this time  No exacerbation of sx with program and patient demonstrated the proper amount of fatigue for program  Will continue to monitor pain levels and continue to progress as able  Plan: Continue per plan of care  Progress treatment as tolerated         Precautions:  Chronic LBP     Manuals 9/21  9/23  9/8 9/10  9/15 9/18   Graston to the right lateral thigh with sweeping, tracing/edging, and J turns  NT   10 min  10 min  10 min  NT    STM to the right ITB proximal to distal  5 min  5 min  5 min  5 min  5 min  5 mi n   Right hip HS, piriformis, gastroc, ITB, quad and manual hip traction  10 min  10 min      10 min              Neuro Re-Ed          Hip AB/AD machine   2x10  Each  30# abd  15# add    2x10 Each   45#/ABD  30#/ADD 2x10   Each   45#/ABD  30#/ADD                                                                Ther Ex          [address] [facility]  10 min  L3  10 min   L3  10 min L3  10 min L4  10 min L5  10 min L3    ITB stretch     HEP       Front and lateral step up  2x10  bilat  6" step 2x10  bilat  6" step  2x10 Bilat   6" step  2x10 Bilat  6" step  2x10 Bilat  6" step    Step downs           Monster walk  6x10  Feet  Green  6x 10  Feet  Green   4x10 Feet   Green  6x10 Feet   Green  8x10 Feet   Green     Side step and squat  6x 10 feet  green 6x 10  Feet  Green   4x10   Feet Green  6x10 Feet   Green  8x10 Feet   Green     Hip AB/AD machine           Bridge with TB     2x10   Red TB  NT NT    Supine Tball ABD crunch  2x10  3" hold 2x10  3" hold   2x10   3" Hold  2x10   3" Hold  2x10   3" Hold     LTR with Tball  2x10  5" hold 2x10 5"   Hold   10x5" Hold   Bilat  10x5" Hold Bilat  10x5" Hold Bilat    S/L Clamshells     15x Red   Right Only  NT NT    Bridge with Tball  2x10  3" hold  2x10  3" hold   2x10   3" Hold  NT 2x10  5" Hold     HEP update/review        10 min    Ther Activity                              Gait Training                              Modalities          MHP to the right lateral hip and knee in seated  15 min 15 min  15 min  15 min  15 min  15 min

## 2020-09-25 ENCOUNTER — DOCUMENTATION (OUTPATIENT)
Dept: PSYCHIATRY | Facility: CLINIC | Age: 60
End: 2020-09-25

## 2020-09-28 DIAGNOSIS — F41.1 GENERALIZED ANXIETY DISORDER: ICD-10-CM

## 2020-09-28 RX ORDER — ALPRAZOLAM 0.5 MG/1
TABLET ORAL
Qty: 180 TABLET | Refills: 1 | Status: SHIPPED | OUTPATIENT
Start: 2020-09-28 | End: 2021-06-15 | Stop reason: ALTCHOICE

## 2020-09-29 ENCOUNTER — OFFICE VISIT (OUTPATIENT)
Dept: PHYSICAL THERAPY | Facility: CLINIC | Age: 60
End: 2020-09-29
Payer: COMMERCIAL

## 2020-09-29 DIAGNOSIS — M17.11 PRIMARY OSTEOARTHRITIS OF RIGHT KNEE: Primary | ICD-10-CM

## 2020-09-29 DIAGNOSIS — M70.61 TROCHANTERIC BURSITIS OF RIGHT HIP: ICD-10-CM

## 2020-09-29 PROCEDURE — 97140 MANUAL THERAPY 1/> REGIONS: CPT

## 2020-09-29 PROCEDURE — 97110 THERAPEUTIC EXERCISES: CPT

## 2020-09-29 NOTE — PROGRESS NOTES
Daily Note     Today's date: 2020  Patient name: Sully Rowland  : 1960  MRN: [id number]  Referring provider: Joe Sood PA-C  Dx:   Encounter Diagnosis     ICD-10-CM    1  Primary osteoarthritis of right knee  M17 11    2  Trochanteric bursitis of right hip  M70 61                   Subjective: patient stated she has definitely seen improvement in her hip       Objective: See treatment diary below      Assessment: Patient able to progress with repetitions and resistance with various exercises  Good tolerance with progression with minimal cues required  Patient continues to present with deficits with strength and ROM requiring continued skilled physical therapy      Plan: Continue per plan of care  Progress treatment as tolerated         Precautions:  Chronic LBP     Manuals 9/21  9/23 9/29  9/10  9/15 9/18   Graston to the right lateral thigh with sweeping, tracing/edging, and J turns  NT    10 min  10 min  NT    STM to the right ITB proximal to distal  5 min  5 min 5 min  5 min  5 min  5 mi n   Right hip HS, piriformis, gastroc, ITB, quad and manual hip traction  10 min  10 min  10 min    10 min              Neuro Re-Ed          Hip AB/AD machine   2x10  Each  30# abd  15# add  2x10 each  45# abd  15+1# add  2x10 Each   45#/ABD  30#/ADD 2x10   Each   45#/ABD  30#/ADD 2x10  Each  45# ABD  30# ADD                                                               Ther Ex          SRC  10 min  L3  10 min   L3 10 min  L4  10 min L4  10 min L5  10 min L3    ITB stretch           Front and lateral step up  2x10  bilat  6" step 2x10  bilat  6" step 2x10  bilat  6" step  2x10 Bilat  6" step  2x10 Bilat  6" step    Step downs           Monster walk  6x10  Feet  Green  6x 10  Feet  Green  4x 10  Feet  Blue    6x10 Feet   Green  8x10 Feet   Green  4x 10 feet  blue   Side step and squat  6x 10 feet  green 6x 10  Feet  Green  4x10  Feet  Blue   6x10 Feet   Green  8x10 Feet   Green  4x 10 feet  Blue    Hip AB/AD machine Bridge with TB      NT NT    Supine Tball ABD crunch  2x10  3" hold 2x10  3" hold  2x10  3" hold   2x10   3" Hold  2x10   3" Hold     LTR with Tball  2x10  5" hold 2x10 5"   Hold  2x10 5" hold   10x5" Hold Bilat  10x5" Hold Bilat 10x 5" hold   S/L Clamshells      NT NT    Bridge with Tball  2x10  3" hold  2x10  3" hold  2x10  3" hold   NT 2x10  5" Hold     HEP update/review        10 min    Ther Activity                              Gait Training                              Modalities          MHP to the right lateral hip and knee in seated  15 min 15 min 15 min  15 min  15 min  15 min

## 2020-10-01 ENCOUNTER — OFFICE VISIT (OUTPATIENT)
Dept: PHYSICAL THERAPY | Facility: CLINIC | Age: 60
End: 2020-10-01
Payer: COMMERCIAL

## 2020-10-01 ENCOUNTER — APPOINTMENT (OUTPATIENT)
Dept: PHYSICAL THERAPY | Facility: CLINIC | Age: 60
End: 2020-10-01
Payer: COMMERCIAL

## 2020-10-01 DIAGNOSIS — M70.61 TROCHANTERIC BURSITIS OF RIGHT HIP: ICD-10-CM

## 2020-10-01 DIAGNOSIS — M17.11 PRIMARY OSTEOARTHRITIS OF RIGHT KNEE: Primary | ICD-10-CM

## 2020-10-01 PROCEDURE — 97110 THERAPEUTIC EXERCISES: CPT

## 2020-10-01 PROCEDURE — 97112 NEUROMUSCULAR REEDUCATION: CPT

## 2020-10-01 PROCEDURE — 97140 MANUAL THERAPY 1/> REGIONS: CPT

## 2020-10-02 ENCOUNTER — APPOINTMENT (OUTPATIENT)
Dept: PHYSICAL THERAPY | Facility: CLINIC | Age: 60
End: 2020-10-02
Payer: COMMERCIAL

## 2020-10-06 ENCOUNTER — OFFICE VISIT (OUTPATIENT)
Dept: PHYSICAL THERAPY | Facility: CLINIC | Age: 60
End: 2020-10-06
Payer: COMMERCIAL

## 2020-10-06 DIAGNOSIS — M70.61 TROCHANTERIC BURSITIS OF RIGHT HIP: ICD-10-CM

## 2020-10-06 DIAGNOSIS — M17.11 PRIMARY OSTEOARTHRITIS OF RIGHT KNEE: Primary | ICD-10-CM

## 2020-10-06 PROCEDURE — 97112 NEUROMUSCULAR REEDUCATION: CPT

## 2020-10-06 PROCEDURE — 97140 MANUAL THERAPY 1/> REGIONS: CPT

## 2020-10-06 PROCEDURE — 97110 THERAPEUTIC EXERCISES: CPT

## 2020-10-08 ENCOUNTER — EVALUATION (OUTPATIENT)
Dept: PHYSICAL THERAPY | Facility: CLINIC | Age: 60
End: 2020-10-08
Payer: COMMERCIAL

## 2020-10-08 DIAGNOSIS — M70.61 TROCHANTERIC BURSITIS OF RIGHT HIP: ICD-10-CM

## 2020-10-08 DIAGNOSIS — M17.11 PRIMARY OSTEOARTHRITIS OF RIGHT KNEE: Primary | ICD-10-CM

## 2020-10-08 PROCEDURE — 97110 THERAPEUTIC EXERCISES: CPT

## 2020-10-08 PROCEDURE — 97112 NEUROMUSCULAR REEDUCATION: CPT

## 2020-10-08 PROCEDURE — 97140 MANUAL THERAPY 1/> REGIONS: CPT | Performed by: PHYSICAL THERAPIST

## 2020-10-08 PROCEDURE — 97140 MANUAL THERAPY 1/> REGIONS: CPT

## 2020-10-13 ENCOUNTER — OFFICE VISIT (OUTPATIENT)
Dept: PHYSICAL THERAPY | Facility: CLINIC | Age: 60
End: 2020-10-13
Payer: COMMERCIAL

## 2020-10-13 DIAGNOSIS — M17.11 PRIMARY OSTEOARTHRITIS OF RIGHT KNEE: Primary | ICD-10-CM

## 2020-10-13 DIAGNOSIS — M70.61 TROCHANTERIC BURSITIS OF RIGHT HIP: ICD-10-CM

## 2020-10-13 PROCEDURE — 97110 THERAPEUTIC EXERCISES: CPT

## 2020-10-13 PROCEDURE — 97140 MANUAL THERAPY 1/> REGIONS: CPT

## 2020-10-13 PROCEDURE — 97112 NEUROMUSCULAR REEDUCATION: CPT

## 2020-10-15 ENCOUNTER — OFFICE VISIT (OUTPATIENT)
Dept: PHYSICAL THERAPY | Facility: CLINIC | Age: 60
End: 2020-10-15
Payer: COMMERCIAL

## 2020-10-15 DIAGNOSIS — M70.61 TROCHANTERIC BURSITIS OF RIGHT HIP: ICD-10-CM

## 2020-10-15 DIAGNOSIS — M17.11 PRIMARY OSTEOARTHRITIS OF RIGHT KNEE: Primary | ICD-10-CM

## 2020-10-15 PROCEDURE — 97140 MANUAL THERAPY 1/> REGIONS: CPT

## 2020-10-15 PROCEDURE — 97112 NEUROMUSCULAR REEDUCATION: CPT

## 2020-10-15 PROCEDURE — 97110 THERAPEUTIC EXERCISES: CPT

## 2020-10-20 ENCOUNTER — OFFICE VISIT (OUTPATIENT)
Dept: PHYSICAL THERAPY | Facility: CLINIC | Age: 60
End: 2020-10-20
Payer: COMMERCIAL

## 2020-10-20 DIAGNOSIS — M70.61 TROCHANTERIC BURSITIS OF RIGHT HIP: ICD-10-CM

## 2020-10-20 DIAGNOSIS — M17.11 PRIMARY OSTEOARTHRITIS OF RIGHT KNEE: Primary | ICD-10-CM

## 2020-10-20 PROCEDURE — 97110 THERAPEUTIC EXERCISES: CPT

## 2020-10-20 PROCEDURE — 97140 MANUAL THERAPY 1/> REGIONS: CPT

## 2020-10-22 ENCOUNTER — OFFICE VISIT (OUTPATIENT)
Dept: PHYSICAL THERAPY | Facility: CLINIC | Age: 60
End: 2020-10-22
Payer: COMMERCIAL

## 2020-10-22 DIAGNOSIS — M17.11 PRIMARY OSTEOARTHRITIS OF RIGHT KNEE: Primary | ICD-10-CM

## 2020-10-22 DIAGNOSIS — M70.61 TROCHANTERIC BURSITIS OF RIGHT HIP: ICD-10-CM

## 2020-10-22 PROCEDURE — 97112 NEUROMUSCULAR REEDUCATION: CPT | Performed by: PHYSICAL THERAPIST

## 2020-10-22 PROCEDURE — 97140 MANUAL THERAPY 1/> REGIONS: CPT | Performed by: PHYSICAL THERAPIST

## 2020-10-22 PROCEDURE — 97110 THERAPEUTIC EXERCISES: CPT | Performed by: PHYSICAL THERAPIST

## 2020-10-26 ENCOUNTER — TELEMEDICINE (OUTPATIENT)
Dept: PSYCHIATRY | Facility: CLINIC | Age: 60
End: 2020-10-26
Payer: COMMERCIAL

## 2020-10-26 DIAGNOSIS — F31.9 BIPOLAR DISORDER WITH DEPRESSION (HCC): Primary | ICD-10-CM

## 2020-10-26 PROCEDURE — 99213 OFFICE O/P EST LOW 20 MIN: CPT | Performed by: NURSE PRACTITIONER

## 2020-10-27 ENCOUNTER — OFFICE VISIT (OUTPATIENT)
Dept: PHYSICAL THERAPY | Facility: CLINIC | Age: 60
End: 2020-10-27
Payer: COMMERCIAL

## 2020-10-27 DIAGNOSIS — M17.11 PRIMARY OSTEOARTHRITIS OF RIGHT KNEE: Primary | ICD-10-CM

## 2020-10-27 DIAGNOSIS — M70.61 TROCHANTERIC BURSITIS OF RIGHT HIP: ICD-10-CM

## 2020-10-27 PROCEDURE — 97140 MANUAL THERAPY 1/> REGIONS: CPT

## 2020-10-27 PROCEDURE — 97110 THERAPEUTIC EXERCISES: CPT

## 2020-10-27 PROCEDURE — 97112 NEUROMUSCULAR REEDUCATION: CPT

## 2020-10-29 ENCOUNTER — OFFICE VISIT (OUTPATIENT)
Dept: PHYSICAL THERAPY | Facility: CLINIC | Age: 60
End: 2020-10-29
Payer: COMMERCIAL

## 2020-10-29 DIAGNOSIS — M70.61 TROCHANTERIC BURSITIS OF RIGHT HIP: ICD-10-CM

## 2020-10-29 DIAGNOSIS — M17.11 PRIMARY OSTEOARTHRITIS OF RIGHT KNEE: Primary | ICD-10-CM

## 2020-10-29 PROCEDURE — 97140 MANUAL THERAPY 1/> REGIONS: CPT

## 2020-10-29 PROCEDURE — 97110 THERAPEUTIC EXERCISES: CPT

## 2020-11-03 ENCOUNTER — EVALUATION (OUTPATIENT)
Dept: PHYSICAL THERAPY | Facility: CLINIC | Age: 60
End: 2020-11-03
Payer: COMMERCIAL

## 2020-11-03 DIAGNOSIS — M70.61 TROCHANTERIC BURSITIS OF RIGHT HIP: ICD-10-CM

## 2020-11-03 DIAGNOSIS — M17.11 PRIMARY OSTEOARTHRITIS OF RIGHT KNEE: Primary | ICD-10-CM

## 2020-11-03 PROCEDURE — 97535 SELF CARE MNGMENT TRAINING: CPT | Performed by: PHYSICAL THERAPIST

## 2020-11-03 PROCEDURE — 97140 MANUAL THERAPY 1/> REGIONS: CPT | Performed by: PHYSICAL THERAPIST

## 2020-11-05 ENCOUNTER — APPOINTMENT (OUTPATIENT)
Dept: PHYSICAL THERAPY | Facility: CLINIC | Age: 60
End: 2020-11-05
Payer: COMMERCIAL

## 2020-11-12 DIAGNOSIS — F33.1 MAJOR DEPRESSIVE DISORDER, RECURRENT, MODERATE (HCC): ICD-10-CM

## 2020-11-13 ENCOUNTER — TELEPHONE (OUTPATIENT)
Dept: OBGYN CLINIC | Facility: MEDICAL CENTER | Age: 60
End: 2020-11-13

## 2020-11-13 RX ORDER — VENLAFAXINE HYDROCHLORIDE 75 MG/1
CAPSULE, EXTENDED RELEASE ORAL
Qty: 180 CAPSULE | Refills: 3 | Status: SHIPPED | OUTPATIENT
Start: 2020-11-13 | End: 2021-06-15

## 2020-12-08 ENCOUNTER — TELEMEDICINE (OUTPATIENT)
Dept: PSYCHIATRY | Facility: CLINIC | Age: 60
End: 2020-12-08
Payer: COMMERCIAL

## 2020-12-08 DIAGNOSIS — R37 SEXUAL DYSFUNCTION: ICD-10-CM

## 2020-12-08 DIAGNOSIS — F31.9 BIPOLAR I DISORDER WITH DEPRESSION (HCC): ICD-10-CM

## 2020-12-08 DIAGNOSIS — F41.1 GENERALIZED ANXIETY DISORDER: ICD-10-CM

## 2020-12-08 PROCEDURE — 99214 OFFICE O/P EST MOD 30 MIN: CPT | Performed by: NURSE PRACTITIONER

## 2020-12-08 RX ORDER — BUPROPION HYDROCHLORIDE 75 MG/1
75 TABLET ORAL DAILY
Qty: 30 TABLET | Refills: 2 | Status: SHIPPED | OUTPATIENT
Start: 2020-12-08 | End: 2020-12-08 | Stop reason: SDUPTHER

## 2020-12-08 RX ORDER — BUPROPION HYDROCHLORIDE 75 MG/1
75 TABLET ORAL DAILY
Qty: 90 TABLET | Refills: 2 | Status: SHIPPED | OUTPATIENT
Start: 2020-12-08 | End: 2020-12-09 | Stop reason: SDUPTHER

## 2020-12-09 ENCOUNTER — TELEPHONE (OUTPATIENT)
Dept: PSYCHIATRY | Facility: CLINIC | Age: 60
End: 2020-12-09

## 2020-12-09 DIAGNOSIS — F31.9 BIPOLAR I DISORDER WITH DEPRESSION (HCC): ICD-10-CM

## 2020-12-09 DIAGNOSIS — F33.1 MODERATE EPISODE OF RECURRENT MAJOR DEPRESSIVE DISORDER (HCC): Primary | ICD-10-CM

## 2020-12-09 DIAGNOSIS — R37 SEXUAL DYSFUNCTION: ICD-10-CM

## 2020-12-09 RX ORDER — CLONAZEPAM 0.5 MG/1
TABLET ORAL
Qty: 180 TABLET | Refills: 1 | Status: SHIPPED | OUTPATIENT
Start: 2020-12-09 | End: 2021-06-28

## 2020-12-09 RX ORDER — BUPROPION HYDROCHLORIDE 75 MG/1
75 TABLET ORAL DAILY
Qty: 30 TABLET | Refills: 1 | Status: SHIPPED | OUTPATIENT
Start: 2020-12-09 | End: 2021-04-09 | Stop reason: SDUPTHER

## 2020-12-09 RX ORDER — BUPROPION HYDROCHLORIDE 75 MG/1
75 TABLET ORAL DAILY
Qty: 90 TABLET | Refills: 3 | Status: SHIPPED | OUTPATIENT
Start: 2020-12-09 | End: 2021-03-09 | Stop reason: SDUPTHER

## 2021-01-10 ENCOUNTER — OFFICE VISIT (OUTPATIENT)
Dept: URGENT CARE | Facility: CLINIC | Age: 61
End: 2021-01-10
Payer: COMMERCIAL

## 2021-01-10 VITALS
RESPIRATION RATE: 18 BRPM | SYSTOLIC BLOOD PRESSURE: 134 MMHG | OXYGEN SATURATION: 96 % | BODY MASS INDEX: 43.94 KG/M2 | HEART RATE: 76 BPM | WEIGHT: 248 LBS | DIASTOLIC BLOOD PRESSURE: 80 MMHG | TEMPERATURE: 99.1 F | HEIGHT: 63 IN

## 2021-01-10 DIAGNOSIS — H60.502 ACUTE OTITIS EXTERNA OF LEFT EAR, UNSPECIFIED TYPE: Primary | ICD-10-CM

## 2021-01-10 PROCEDURE — 99213 OFFICE O/P EST LOW 20 MIN: CPT | Performed by: PHYSICIAN ASSISTANT

## 2021-01-10 RX ORDER — OFLOXACIN 3 MG/ML
10 SOLUTION AURICULAR (OTIC) 2 TIMES DAILY
Qty: 10 ML | Refills: 0 | Status: SHIPPED | OUTPATIENT
Start: 2021-01-10 | End: 2021-01-15

## 2021-01-10 RX ORDER — CEPHALEXIN 500 MG/1
500 CAPSULE ORAL EVERY 8 HOURS SCHEDULED
Qty: 21 CAPSULE | Refills: 0 | Status: SHIPPED | OUTPATIENT
Start: 2021-01-10 | End: 2021-01-17

## 2021-01-10 RX ORDER — FLUCONAZOLE 150 MG/1
150 TABLET ORAL ONCE
Qty: 1 TABLET | Refills: 0 | Status: SHIPPED | OUTPATIENT
Start: 2021-01-10 | End: 2021-01-10

## 2021-01-10 RX ORDER — OXYBUTYNIN CHLORIDE 10 MG/1
10 TABLET, EXTENDED RELEASE ORAL
COMMUNITY

## 2021-01-10 NOTE — PROGRESS NOTES
[facility]    NAME: Violet Nix is a 63 y o  female  : 1960    MRN: [id number]  DATE: January 10, 2021  TIME: 12:54 PM    Assessment and Plan   Acute otitis externa of left ear, unspecified type [H60 502]  1  Acute otitis externa of left ear, unspecified type  ofloxacin (FLOXIN) 0 3 % otic solution    cephalexin (KEFLEX) 500 mg capsule    fluconazole (DIFLUCAN) 150 mg tablet       Patient Instructions     Patient Instructions   Drops and antibiotic as directed  Diflucan if needed  Follow up with pcp if not improving  Chief Complaint     Chief Complaint   Patient presents with    Earache     left ear pain for 2 days       History of Present Illness   50-year-old female here with complaint of left ear ache and pain  Hurts to touch her ear  Denies any fever chills  No upper respiratory complaints  No cough  Otherwise does not feel ill  Patient states that she does get psoriasis in her ears and it is very itchy  Review of Systems   Review of Systems   Constitutional: Negative for appetite change, chills and fever  HENT: Positive for ear pain  Negative for congestion, ear discharge, facial swelling, postnasal drip, sinus pressure, sneezing and sore throat  Respiratory: Negative for cough, shortness of breath and wheezing  Neurological: Negative for headaches         Current Medications     Current Outpatient Medications:     acetaminophen (TYLENOL) 500 mg tablet, Take 500 mg by mouth every 6 (six) hours as needed for mild pain, Disp: , Rfl:     ALPRAZolam (XANAX) 0 5 mg tablet, TAKE 1 TABLET BY MOUTH  TWICE DAILY AS NEEDED FOR  ANXIETY, Disp: 180 tablet, Rfl: 1    buPROPion (WELLBUTRIN) 75 mg tablet, Take 1 tablet (75 mg total) by mouth daily, Disp: 30 tablet, Rfl: 1    clonazePAM (KlonoPIN) 0 5 mg tablet, TAKE 1 TABLET BY MOUTH 3 TO 4 TIMES DAILY, Disp: 180 tablet, Rfl: 1    lithium carbonate 300 mg capsule, 2 HS, Disp: 180 capsule, Rfl: 2    Multiple Vitamin (MULTIVITAMIN) capsule, Take 1 capsule by mouth daily, Disp: , Rfl:     oxybutynin (DITROPAN-XL) 10 MG 24 hr tablet, Take 10 mg by mouth daily at bedtime, Disp: , Rfl:     traZODone (DESYREL) 100 mg tablet, 1-2 tabs HS, Disp: 180 tablet, Rfl: 2    venlafaxine (EFFEXOR-XR) 75 mg 24 hr capsule, TAKE 2 CAPSULES BY MOUTH  DAILY, Disp: 180 capsule, Rfl: 3    aspirin (ECOTRIN) 325 mg EC tablet, Take 1 tablet (325 mg total) by mouth 2 (two) times a day, Disp: 28 tablet, Rfl: 0    buPROPion (WELLBUTRIN) 75 mg tablet, Take 1 tablet (75 mg total) by mouth daily (Patient not taking: Reported on 1/10/2021), Disp: 90 tablet, Rfl: 3    cephalexin (KEFLEX) 500 mg capsule, Take 1 capsule (500 mg total) by mouth every 8 (eight) hours for 7 days, Disp: 21 capsule, Rfl: 0    fluconazole (DIFLUCAN) 150 mg tablet, Take 1 tablet (150 mg total) by mouth once for 1 dose, Disp: 1 tablet, Rfl: 0    ofloxacin (FLOXIN) 0 3 % otic solution, Administer 10 drops into the left ear 2 (two) times a day for 5 days, Disp: 10 mL, Rfl: 0    Current Allergies     Allergies as of 01/10/2021 - Reviewed 01/10/2021   Allergen Reaction Noted    Banana Hives 09/27/2018    Crab (diagnostic) Hives 09/10/2018    Cleocin [clindamycin]  09/27/2018    Latex  09/27/2018    Penicillins Other (See Comments) 07/11/2018          The following portions of the patient's history were reviewed and updated as appropriate: allergies, current medications, past family history, past medical history, past social history, past surgical history and problem list    Past Medical History:   Diagnosis Date    Anxiety     Arthritis     Back pain     Lower back    Bipolar 1 disorder ([address] )     Depression     Diverticulosis     Hip dysplasia     Bilateral    Hyperlipidemia     Knee pain, right     Obesity     Psoriasis     Along hairline      Stress incontinence      Past Surgical History:   Procedure Laterality Date    COLONOSCOPY      DILATION AND CURETTAGE OF UTERUS      One D&C and one D&E    ESOPHAGOGASTRODUODENOSCOPY      NASAL SEPTUM SURGERY      GA KNEE SCOPE,MED/LAT MENISECTOMY Right 9/27/2018    Procedure: ARTHROSCOPY KNEE; PARTIAL MEDIAL MENISECTOMY; CHONDROPLASTY;  Surgeon: Monique Wong DO;  Location: [facility];  Service: Orthopedics    TONSILECTOMY AND ADNOIDECTOMY      TONSILLECTOMY       Family History   Problem Relation Age of Onset    Asthma Mother     No Known Problems Father     Rectal cancer Sister 62    No Known Problems Daughter     No Known Problems Maternal Grandmother     No Known Problems Maternal Grandfather     No Known Problems Paternal Grandmother     No Known Problems Paternal Grandfather     No Known Problems Paternal Aunt      Social History     Socioeconomic History    Marital status: /Civil Union     Spouse name: Not on file    Number of children: Not on file    Years of education: Not on file    Highest education level: Not on file   Occupational History    Not on file   Social Needs    Financial resource strain: Not on file    Food insecurity     Worry: Not on file     Inability: Not on file    Transportation needs     Medical: Not on file     Non-medical: Not on file   Tobacco Use    Smoking status: Never Smoker    Smokeless tobacco: Never Used   Substance and Sexual Activity    Alcohol use: Yes     Comment: 5 yearly    Drug use: No    Sexual activity: Not on file   Lifestyle    Physical activity     Days per week: Not on file     Minutes per session: Not on file    Stress: Not on file   Relationships    Social connections     Talks on phone: Not on file     Gets together: Not on file     Attends Congregational service: Not on file     Active member of club or organization: Not on file     Attends meetings of clubs or organizations: Not on file     Relationship status: Not on file    Intimate partner violence     Fear of current or ex partner: Not on file     Emotionally abused: Not on file Physically abused: Not on file     Forced sexual activity: Not on file   Other Topics Concern    Not on file   Social History Narrative    Not on file     Medications have been verified  Objective   /80   Pulse 76   Temp 99 1 °F (37 3 °C) (Temporal)   Resp 18   Ht 5' 3" (1 6 m)   Wt 112 kg (248 lb)   SpO2 96%   BMI 43 93 kg/m²      Physical Exam   Physical Exam  Vitals signs and nursing note reviewed  Constitutional:       General: She is not in acute distress  Appearance: She is well-developed  HENT:      Head: Normocephalic and atraumatic  Right Ear: Tympanic membrane normal  Swelling and tenderness present  No drainage  Left Ear: Tympanic membrane normal       Nose: Nose normal  No mucosal edema or rhinorrhea  Right Sinus: No maxillary sinus tenderness or frontal sinus tenderness  Left Sinus: No maxillary sinus tenderness or frontal sinus tenderness  Mouth/Throat:      Pharynx: No oropharyngeal exudate or posterior oropharyngeal erythema  Eyes:      Conjunctiva/sclera: Conjunctivae normal    Cardiovascular:      Rate and Rhythm: Normal rate and regular rhythm  Heart sounds: Normal heart sounds  No murmur

## 2021-01-12 ENCOUNTER — IMMUNIZATIONS (OUTPATIENT)
Dept: FAMILY MEDICINE CLINIC | Facility: HOSPITAL | Age: 61
End: 2021-01-12

## 2021-01-12 DIAGNOSIS — Z23 ENCOUNTER FOR IMMUNIZATION: ICD-10-CM

## 2021-01-12 PROCEDURE — 0011A SARS-COV-2 / COVID-19 MRNA VACCINE (MODERNA) 100 MCG: CPT

## 2021-01-12 PROCEDURE — 91301 SARS-COV-2 / COVID-19 MRNA VACCINE (MODERNA) 100 MCG: CPT

## 2021-02-09 ENCOUNTER — IMMUNIZATIONS (OUTPATIENT)
Dept: FAMILY MEDICINE CLINIC | Facility: HOSPITAL | Age: 61
End: 2021-02-09

## 2021-02-09 DIAGNOSIS — Z23 ENCOUNTER FOR IMMUNIZATION: Primary | ICD-10-CM

## 2021-02-09 PROCEDURE — 91301 SARS-COV-2 / COVID-19 MRNA VACCINE (MODERNA) 100 MCG: CPT

## 2021-02-09 PROCEDURE — 0012A SARS-COV-2 / COVID-19 MRNA VACCINE (MODERNA) 100 MCG: CPT

## 2021-02-21 ENCOUNTER — OFFICE VISIT (OUTPATIENT)
Dept: URGENT CARE | Facility: CLINIC | Age: 61
End: 2021-02-21
Payer: COMMERCIAL

## 2021-02-21 VITALS
SYSTOLIC BLOOD PRESSURE: 152 MMHG | RESPIRATION RATE: 18 BRPM | DIASTOLIC BLOOD PRESSURE: 69 MMHG | HEART RATE: 92 BPM | OXYGEN SATURATION: 97 % | BODY MASS INDEX: 41.83 KG/M2 | TEMPERATURE: 98.8 F | HEIGHT: 64 IN | WEIGHT: 245 LBS

## 2021-02-21 DIAGNOSIS — J34.0 CELLULITIS OF NASAL TIP: Primary | ICD-10-CM

## 2021-02-21 PROCEDURE — 99213 OFFICE O/P EST LOW 20 MIN: CPT | Performed by: PHYSICIAN ASSISTANT

## 2021-02-21 RX ORDER — AMOXICILLIN 500 MG/1
500 TABLET, FILM COATED ORAL 3 TIMES DAILY
Qty: 21 TABLET | Refills: 0 | Status: SHIPPED | OUTPATIENT
Start: 2021-02-21 | End: 2021-02-28

## 2021-02-21 NOTE — PROGRESS NOTES
[address]          NAME: Josué Castorena is a 63 y o  female  : 1960    MRN: [id number]  DATE: 2021  TIME: 1:37 PM    Assessment and Plan   Cellulitis of nasal tip [J34 0]  1  Cellulitis of nasal tip  amoxicillin (AMOXIL) 500 MG tablet       Patient Instructions   Cellulitis   AMBULATORY CARE:   Cellulitis  is a skin infection caused by bacteria  Cellulitis usually appears on the legs and feet, arms and hands, or face  Common signs and symptoms include the following:   · A red, warm, swollen area on your skin    · Pain when the area is touched    · Bumps or blisters (abscess) that may drain pus    · Bumpy, raised skin that feels like an orange peel    Call 911 if:   · You have sudden trouble breathing or chest pain  Seek care immediately if:   · Your wound gets larger and more painful  · You feel a crackling under your skin when you touch it  · You have purple dots or bumps on your skin, or you see bleeding under your skin  · You have new swelling and pain in your legs  · The red, warm, swollen area gets larger  · You see red streaks coming from the infected area  Contact your healthcare provider if:   · You have a fever  · Your fever or pain does not go away or gets worse  · The area does not get smaller after 2 days of antibiotics  · Your skin is flaking or peeling off  · You have questions or concerns about your condition or care  Treatment for cellulitis  may decrease symptoms, stop the infection from spreading, and cure the infection  Treatment depends on how severe your cellulitis is  Cellulitis may go away on its own  You may  instead need antibiotics to help treat the bacterial infection and medicines for pain  Your healthcare provider may draw a [place] around the edges of your cellulitis  If your cellulitis spreads, your healthcare provider will see it outside of the [place]    Manage your symptoms:   · Elevate the area above the level of your heart  as often as you can  This will help decrease swelling and pain  Prop the area on pillows or blankets to keep it elevated comfortably  · Clean the area daily until the wound scabs over  Gently wash the area with soap and water  Pat dry  Use dressings as directed  · Place cool or warm, wet cloths on the area as directed  Use clean cloths and clean water  Leave it on the area until the cloth is room temperature  Pat the area dry with a clean, dry cloth  The cloths may help decrease pain  Prevent cellulitis:   · Do not scratch bug bites or areas of injury  You increase your risk for cellulitis by scratching these areas  · Do not share personal items, such as towels, clothing, and razors  · Clean exercise equipment  with germ-killing  before and after you use it  · Wash your hands often  Use soap and water  Wash your hands after you use the bathroom, change a child's diapers, or sneeze  Wash your hands before you prepare or eat food  Use lotion to prevent dry, cracked skin  · Wear pressure stockings as directed  You may be told to wear the stockings if you have peripheral edema  The stockings improve blood flow and decrease swelling  · Treat athlete's foot  This can help prevent the spread of a bacterial skin infection  Follow up with your healthcare provider within 3 days, or as directed: Your healthcare provider will check if your cellulitis is getting better  You may need different medicine  Write down your questions so you remember to ask them during your visits  © Copyright [address] Information is for End User's use only and may not be sold, redistributed or otherwise used for commercial purposes  All illustrations and images included in CareNotes® are the copyrighted property of [employer]  or [facility]   The above information is an  only  It is not intended as medical advice for individual conditions or treatments  Talk to your doctor, nurse or pharmacist before following any medical regimen to see if it is safe and effective for you  To present to the ER if symptoms worsen  Chief Complaint     Chief Complaint   Patient presents with    Rash     Patient presents with red and warm area to tip of nose that started 3 days ago  History of Present Illness   Violet Nix presents to the clinic c/o    Reports  tried to squeeze it the other day (thinking it was a possible pimple) but no drainage and just an increase in her pain  Rash  This is a new problem  The current episode started in the past 7 days  The problem has been gradually worsening since onset  Location: tip of nose  The rash is characterized by pain, redness and swelling  She was exposed to nothing  Pertinent negatives include no anorexia, congestion, cough, eye pain, facial edema, fatigue, fever, joint pain, rhinorrhea, shortness of breath or sore throat  Past treatments include antibiotic cream (warm compresses)  The treatment provided no relief  Review of Systems   Review of Systems   Constitutional: Negative for chills, diaphoresis, fatigue and fever  HENT: Negative for congestion, ear discharge, ear pain, facial swelling, rhinorrhea and sore throat  Eyes: Negative for photophobia, pain, discharge, redness, itching and visual disturbance  Respiratory: Negative for apnea, cough, chest tightness, shortness of breath and wheezing  Cardiovascular: Negative for chest pain and palpitations  Gastrointestinal: Negative for abdominal pain and anorexia  Musculoskeletal: Negative for joint pain  Skin: Positive for color change and rash  Negative for wound  redness and pain at tip of nose   Neurological: Negative for dizziness and headaches  Hematological: Negative for adenopathy           Current Medications     Long-Term Medications   Medication Sig Dispense Refill    buPROPion (WELLBUTRIN) 75 mg tablet Take 1 tablet (75 mg total) by mouth daily 30 tablet 1    clonazePAM (KlonoPIN) 0 5 mg tablet TAKE 1 TABLET BY MOUTH 3 TO 4 TIMES DAILY 180 tablet 1    lithium carbonate 300 mg capsule 2  capsule 2    Multiple Vitamin (MULTIVITAMIN) capsule Take 1 capsule by mouth daily      oxybutynin (DITROPAN-XL) 10 MG 24 hr tablet Take 10 mg by mouth daily at bedtime      traZODone (DESYREL) 100 mg tablet 1-2 tabs  tablet 2    venlafaxine (EFFEXOR-XR) 75 mg 24 hr capsule TAKE 2 CAPSULES BY MOUTH  DAILY 180 capsule 3    ALPRAZolam (XANAX) 0 5 mg tablet TAKE 1 TABLET BY MOUTH  TWICE DAILY AS NEEDED FOR  ANXIETY (Patient not taking: Reported on 2/21/2021) 180 tablet 1    aspirin (ECOTRIN) 325 mg EC tablet Take 1 tablet (325 mg total) by mouth 2 (two) times a day (Patient not taking: Reported on 2/21/2021) 28 tablet 0    buPROPion (WELLBUTRIN) 75 mg tablet Take 1 tablet (75 mg total) by mouth daily (Patient not taking: Reported on 1/10/2021) 90 tablet 3       Current Allergies     Allergies as of 02/21/2021 - Reviewed 02/21/2021   Allergen Reaction Noted    Banana Hives 09/27/2018    Crab (diagnostic) Hives 09/10/2018    Cleocin [clindamycin]  09/27/2018    Latex  09/27/2018    Penicillins Other (See Comments) 07/11/2018            The following portions of the patient's history were reviewed and updated as appropriate: allergies, current medications, past family history, past medical history, past social history, past surgical history and problem list   Past Medical History:   Diagnosis Date    Anxiety     Arthritis     Back pain     Lower back    Bipolar 1 disorder ([facility] [address] )     Depression     Diverticulosis     Hip dysplasia     Bilateral    Hyperlipidemia     Knee pain, right     Obesity     Psoriasis     Along hairline      Stress incontinence      Past Surgical History:   Procedure Laterality Date    COLONOSCOPY      DILATION AND CURETTAGE OF UTERUS      One D&C and one D&E    ESOPHAGOGASTRODUODENOSCOPY      NASAL SEPTUM SURGERY      ME KNEE SCOPE,MED/LAT MENISECTOMY Right 9/27/2018    Procedure: ARTHROSCOPY KNEE; PARTIAL MEDIAL MENISECTOMY; CHONDROPLASTY;  Surgeon: Sidney Houston DO;  Location: [facility];  Service: Orthopedics    TONSILECTOMY AND ADNOIDECTOMY      TONSILLECTOMY       Social History     Socioeconomic History    Marital status: /Civil Union     Spouse name: Not on file    Number of children: Not on file    Years of education: Not on file    Highest education level: Not on file   Occupational History    Not on file   Social Needs    Financial resource strain: Not on file    Food insecurity     Worry: Not on file     Inability: Not on file   [employer] needs     Medical: Not on file     Non-medical: Not on file   Tobacco Use    Smoking status: Never Smoker    Smokeless tobacco: Never Used   Substance and Sexual Activity    Alcohol use: Yes     Comment: 5 yearly    Drug use: No    Sexual activity: Not on file   Lifestyle    Physical activity     Days per week: Not on file     Minutes per session: Not on file    Stress: Not on file   Relationships    Social connections     Talks on phone: Not on file     Gets together: Not on file     Attends Anabaptist service: Not on file     Active member of club or organization: Not on file     Attends meetings of clubs or organizations: Not on file     Relationship status: Not on file    Intimate partner violence     Fear of current or ex partner: Not on file     Emotionally abused: Not on file     Physically abused: Not on file     Forced sexual activity: Not on file   Other Topics Concern    Not on file   Social History Narrative    Not on file       Objective   /69   Pulse 92   Temp 98 8 °F (37 1 °C) (Temporal)   Resp 18   Ht 5' 4" (1 626 m)   Wt 111 kg (245 lb)   SpO2 97%   BMI 42 05 kg/m²      Physical Exam     Physical Exam  Vitals signs and nursing note reviewed     Constitutional:       General: She is not in acute distress  Appearance: She is well-developed  She is not diaphoretic  HENT:      Head: Normocephalic and atraumatic  Right Ear: External ear normal       Left Ear: External ear normal       Nose: Nasal tenderness (tip of nose TTP) present  No nasal deformity, mucosal edema or congestion  Right Nostril: No epistaxis  Left Nostril: No epistaxis  Right Sinus: No maxillary sinus tenderness or frontal sinus tenderness  Left Sinus: No maxillary sinus tenderness or frontal sinus tenderness  Mouth/Throat:      Mouth: Mucous membranes are moist       Pharynx: Oropharynx is clear  No oropharyngeal exudate or posterior oropharyngeal erythema  Eyes:      General: No scleral icterus  Right eye: No discharge  Left eye: No discharge  Conjunctiva/sclera: Conjunctivae normal    Cardiovascular:      Rate and Rhythm: Normal rate and regular rhythm  Heart sounds: Normal heart sounds  No murmur  No friction rub  No gallop  Pulmonary:      Effort: Pulmonary effort is normal  No respiratory distress  Breath sounds: Normal breath sounds  No decreased breath sounds, wheezing, rhonchi or rales  Skin:     General: Skin is warm and dry  Coloration: Skin is not pale  Findings: No erythema or rash  Neurological:      Mental Status: She is alert and oriented to person, place, and time  Psychiatric:         Behavior: Behavior normal          Thought Content:  Thought content normal          Judgment: Judgment normal          Daphne Law PA-C

## 2021-02-21 NOTE — PATIENT INSTRUCTIONS
Cellulitis   AMBULATORY CARE:   Cellulitis  is a skin infection caused by bacteria  Cellulitis usually appears on the legs and feet, arms and hands, or face  Common signs and symptoms include the following:   · A red, warm, swollen area on your skin    · Pain when the area is touched    · Bumps or blisters (abscess) that may drain pus    · Bumpy, raised skin that feels like an orange peel    Call 911 if:   · You have sudden trouble breathing or chest pain  Seek care immediately if:   · Your wound gets larger and more painful  · You feel a crackling under your skin when you touch it  · You have purple dots or bumps on your skin, or you see bleeding under your skin  · You have new swelling and pain in your legs  · The red, warm, swollen area gets larger  · You see red streaks coming from the infected area  Contact your healthcare provider if:   · You have a fever  · Your fever or pain does not go away or gets worse  · The area does not get smaller after 2 days of antibiotics  · Your skin is flaking or peeling off  · You have questions or concerns about your condition or care  Treatment for cellulitis  may decrease symptoms, stop the infection from spreading, and cure the infection  Treatment depends on how severe your cellulitis is  Cellulitis may go away on its own  You may  instead need antibiotics to help treat the bacterial infection and medicines for pain  Your healthcare provider may draw a [place] around the edges of your cellulitis  If your cellulitis spreads, your healthcare provider will see it outside of the [place]  Manage your symptoms:   · Elevate the area above the level of your heart  as often as you can  This will help decrease swelling and pain  Prop the area on pillows or blankets to keep it elevated comfortably  · Clean the area daily until the wound scabs over  Gently wash the area with soap and water  Pat dry  Use dressings as directed       · Place cool or warm, wet cloths on the area as directed  Use clean cloths and clean water  Leave it on the area until the cloth is room temperature  Pat the area dry with a clean, dry cloth  The cloths may help decrease pain  Prevent cellulitis:   · Do not scratch bug bites or areas of injury  You increase your risk for cellulitis by scratching these areas  · Do not share personal items, such as towels, clothing, and razors  · Clean exercise equipment  with germ-killing  before and after you use it  · Wash your hands often  Use soap and water  Wash your hands after you use the bathroom, change a child's diapers, or sneeze  Wash your hands before you prepare or eat food  Use lotion to prevent dry, cracked skin  · Wear pressure stockings as directed  You may be told to wear the stockings if you have peripheral edema  The stockings improve blood flow and decrease swelling  · Treat athlete's foot  This can help prevent the spread of a bacterial skin infection  Follow up with your healthcare provider within 3 days, or as directed: Your healthcare provider will check if your cellulitis is getting better  You may need different medicine  Write down your questions so you remember to ask them during your visits  © Copyright [address] Information is for End User's use only and may not be sold, redistributed or otherwise used for commercial purposes  All illustrations and images included in CareNotes® are the copyrighted property of [employer]  or Yaya Gonzales  The above information is an  only  It is not intended as medical advice for individual conditions or treatments  Talk to your doctor, nurse or pharmacist before following any medical regimen to see if it is safe and effective for you

## 2021-02-24 DIAGNOSIS — U07.1 COVID-19: ICD-10-CM

## 2021-02-24 PROCEDURE — U0003 INFECTIOUS AGENT DETECTION BY NUCLEIC ACID (DNA OR RNA); SEVERE ACUTE RESPIRATORY SYNDROME CORONAVIRUS 2 (SARS-COV-2) (CORONAVIRUS DISEASE [COVID-19]), AMPLIFIED PROBE TECHNIQUE, MAKING USE OF HIGH THROUGHPUT TECHNOLOGIES AS DESCRIBED BY CMS-2020-01-R: HCPCS | Performed by: FAMILY MEDICINE

## 2021-02-24 PROCEDURE — U0005 INFEC AGEN DETEC AMPLI PROBE: HCPCS | Performed by: FAMILY MEDICINE

## 2021-02-25 LAB — SARS-COV-2 RNA RESP QL NAA+PROBE: NEGATIVE

## 2021-02-27 DIAGNOSIS — F31.9 BIPOLAR I DISORDER WITH DEPRESSION (HCC): ICD-10-CM

## 2021-02-28 RX ORDER — TRAZODONE HYDROCHLORIDE 100 MG/1
TABLET ORAL
Qty: 180 TABLET | Refills: 3 | Status: SHIPPED | OUTPATIENT
Start: 2021-02-28 | End: 2021-10-26

## 2021-03-04 ENCOUNTER — LAB (OUTPATIENT)
Dept: LAB | Facility: CLINIC | Age: 61
End: 2021-03-04
Payer: COMMERCIAL

## 2021-03-04 ENCOUNTER — TRANSCRIBE ORDERS (OUTPATIENT)
Dept: LAB | Facility: CLINIC | Age: 61
End: 2021-03-04

## 2021-03-04 DIAGNOSIS — R79.9 ABNORMAL BLOOD CHEMISTRY: ICD-10-CM

## 2021-03-04 DIAGNOSIS — E78.5 HYPERLIPIDEMIA, UNSPECIFIED HYPERLIPIDEMIA TYPE: Primary | ICD-10-CM

## 2021-03-04 DIAGNOSIS — F31.9 BIPOLAR I DISORDER WITH DEPRESSION (HCC): ICD-10-CM

## 2021-03-04 DIAGNOSIS — E03.9 HYPOTHYROIDISM, UNSPECIFIED TYPE: ICD-10-CM

## 2021-03-04 DIAGNOSIS — E78.5 HYPERLIPIDEMIA, UNSPECIFIED HYPERLIPIDEMIA TYPE: ICD-10-CM

## 2021-03-04 LAB
ALBUMIN SERPL BCP-MCNC: 3.9 G/DL (ref 3.5–5)
ALP SERPL-CCNC: 78 U/L (ref 46–116)
ALT SERPL W P-5'-P-CCNC: 41 U/L (ref 12–78)
ANION GAP SERPL CALCULATED.3IONS-SCNC: 5 MMOL/L (ref 4–13)
AST SERPL W P-5'-P-CCNC: 32 U/L (ref 5–45)
BILIRUB SERPL-MCNC: 0.84 MG/DL (ref 0.2–1)
BUN SERPL-MCNC: 9 MG/DL (ref 5–25)
CALCIUM SERPL-MCNC: 9.6 MG/DL (ref 8.3–10.1)
CHLORIDE SERPL-SCNC: 110 MMOL/L (ref 100–108)
CHOLEST SERPL-MCNC: 126 MG/DL (ref 50–200)
CO2 SERPL-SCNC: 26 MMOL/L (ref 21–32)
CREAT SERPL-MCNC: 1.02 MG/DL (ref 0.6–1.3)
ERYTHROCYTE [DISTWIDTH] IN BLOOD BY AUTOMATED COUNT: 14.5 % (ref 11.6–15.1)
EST. AVERAGE GLUCOSE BLD GHB EST-MCNC: 126 MG/DL
GFR SERPL CREATININE-BSD FRML MDRD: 60 ML/MIN/1.73SQ M
GLUCOSE P FAST SERPL-MCNC: 128 MG/DL (ref 65–99)
HBA1C MFR BLD: 6 %
HCT VFR BLD AUTO: 43.4 % (ref 34.8–46.1)
HDLC SERPL-MCNC: 38 MG/DL
HGB BLD-MCNC: 13.6 G/DL (ref 11.5–15.4)
LDLC SERPL CALC-MCNC: 57 MG/DL (ref 0–100)
LITHIUM SERPL-SCNC: 0.5 MMOL/L (ref 0.5–1)
MCH RBC QN AUTO: 26.4 PG (ref 26.8–34.3)
MCHC RBC AUTO-ENTMCNC: 31.3 G/DL (ref 31.4–37.4)
MCV RBC AUTO: 84 FL (ref 82–98)
NONHDLC SERPL-MCNC: 88 MG/DL
PLATELET # BLD AUTO: 168 THOUSANDS/UL (ref 149–390)
PMV BLD AUTO: 11.7 FL (ref 8.9–12.7)
POTASSIUM SERPL-SCNC: 4.1 MMOL/L (ref 3.5–5.3)
PROT SERPL-MCNC: 7.3 G/DL (ref 6.4–8.2)
RBC # BLD AUTO: 5.16 MILLION/UL (ref 3.81–5.12)
SODIUM SERPL-SCNC: 141 MMOL/L (ref 136–145)
TRIGL SERPL-MCNC: 155 MG/DL
TSH SERPL DL<=0.05 MIU/L-ACNC: 1.55 UIU/ML (ref 0.36–3.74)
WBC # BLD AUTO: 8.04 THOUSAND/UL (ref 4.31–10.16)

## 2021-03-04 PROCEDURE — 80053 COMPREHEN METABOLIC PANEL: CPT

## 2021-03-04 PROCEDURE — 36415 COLL VENOUS BLD VENIPUNCTURE: CPT

## 2021-03-04 PROCEDURE — 80061 LIPID PANEL: CPT

## 2021-03-04 PROCEDURE — 80178 ASSAY OF LITHIUM: CPT

## 2021-03-04 PROCEDURE — 84443 ASSAY THYROID STIM HORMONE: CPT

## 2021-03-04 PROCEDURE — 85027 COMPLETE CBC AUTOMATED: CPT

## 2021-03-04 PROCEDURE — 83036 HEMOGLOBIN GLYCOSYLATED A1C: CPT

## 2021-03-09 ENCOUNTER — OFFICE VISIT (OUTPATIENT)
Dept: PSYCHIATRY | Facility: CLINIC | Age: 61
End: 2021-03-09
Payer: COMMERCIAL

## 2021-03-09 DIAGNOSIS — F31.9 BIPOLAR I DISORDER WITH DEPRESSION (HCC): ICD-10-CM

## 2021-03-09 DIAGNOSIS — F31.9 BIPOLAR DISORDER WITH DEPRESSION (HCC): Primary | ICD-10-CM

## 2021-03-09 PROCEDURE — 1036F TOBACCO NON-USER: CPT | Performed by: NURSE PRACTITIONER

## 2021-03-09 PROCEDURE — 99215 OFFICE O/P EST HI 40 MIN: CPT | Performed by: NURSE PRACTITIONER

## 2021-03-09 RX ORDER — GABAPENTIN 100 MG/1
CAPSULE ORAL
Qty: 60 CAPSULE | Refills: 1 | Status: SHIPPED | OUTPATIENT
Start: 2021-03-09 | End: 2021-04-20

## 2021-03-09 RX ORDER — LITHIUM CARBONATE 300 MG/1
CAPSULE ORAL
Qty: 180 CAPSULE | Refills: 2
Start: 2021-03-09 | End: 2021-04-20 | Stop reason: ALTCHOICE

## 2021-03-09 RX ORDER — LITHIUM CARBONATE 150 MG/1
CAPSULE ORAL
Qty: 30 CAPSULE | Refills: 1 | Status: SHIPPED | OUTPATIENT
Start: 2021-03-09 | End: 2021-04-20 | Stop reason: ALTCHOICE

## 2021-03-09 NOTE — PSYCH
TREATMENT PLAN (Medication Management Only)        [facility]    Name and Date of Birth:  Tati Gonzalez 63 y o  1960  Date of Treatment Plan: March 9, 2021  Diagnosis/Diagnoses:    1  Bipolar disorder with depression ([facility] [address] )    2  Bipolar I disorder with depression [facility])      Strengths/Personal Resources for Self-Care: supportive family, supportive friends  Area/Areas of need (in own words): depressive symptoms  1  Long Term Goal: Improved depressive symptoms, acceptable anxiety level  Target Date: 6 months - 9/9/2021  Person/Persons responsible for completion of goal: Maia  2  Short Term Objective (s) - How will we reach this goal?:   A  Provider new recommended medication/dosage changes and/or continue medication(s): Start titration down on lithium and trial gabapentin patient has trialed multiple other mood stabilizers but unfortunately they all had side effects     B   N/A  Target Date: 6 months - 9/9/2021  Person/Persons Responsible for Completion of Goal: Maia  Progress Towards Goals: regressed  Treatment Modality: medication management every 4 weeks  Review due 6 months from date of this plan: 6 months - 9/9/2021  Expected length of service: over 1 year  My Physician/PA/NP and I have developed this plan together and I agree to work on the goals and objectives  I understand the treatment goals that were developed for my treatment

## 2021-03-09 NOTE — PSYCH
MEDICATION MANAGEMENT NOTE        [address]      Name and Date of Birth:  Annie Navarrete 63 y o  1960 MRN: [id number]    Date of Visit: March 9, 2021    Reason for Visit:   A medication management appointment for treatment of bipolar depressive disorder    SUBJECTIVE: patient is a 42-year-old female has a history of bipolar depression  Today, she is quiet subdued slightly tearful and has some complaints but nothing that would result in her being sad  She is vaccinated for the coronavirus crisis and she and her family have made plans for the summer to attend a vacation at the beach  All family members are more comfortable going this year so they will be attending  She worries about a lot of things  She tells me she ruminates and worries about the future for her grandchildren  She worries about the current administration we have in our country  She just worries  A lot of the time, it is needless worry and I explained her that this could be affecting her physical health as well as her mental health  I am asking her to replace these negative thoughts with positive thoughts  And she does tell me she is able to do that and will work more diligently added  She is not suicidal and not homicidal   She would like also to try an alternative to lithium  She has psoriasis which is located on the left side of her head from the lithium and she would like to start making some positive changes in her meds  Also, she wants to retire in May and this was discussion she had today with her  and it looks like it is going to be a good possibility for her  This makes her very pleased  So today we will reduce lithium to 450 mg a day and add gabapentin 200 mg  These will be given at bedtime  I do not want to leave her without a mood stabilizer    She will continue Xanax 0 5 mg only as needed for breakthrough anxiety, Wellbutrin tablet 75 mg daily, Effexor XR 75 mg daily, Klonopin 0 5 mg and she will take this only 2 to 3 times a day if needed not 4 times a day  Gabapentin 200 mg HS has been added to help stabilize mood as we titrate her down on lithium  Trazodone is 100 mg HS  Follow-up with me is in 1 month  Mike Sharma is seen today for a follow up for Bipolar Disorder  She states that she continues to experience on and off depressive symptoms since the last visit  She has been feeling depressed, She has been feeling tearful at times, sluggish and with increased worry  She denies suicidal ideation, intent or plan at present; denies homicidal ideation, intent or plan at present  She denies auditory hallucinations, denies visual hallucinations, denies delusions  She Is requesting a trial off lithium because it is creating psoriasis for her  HPI ROS Appetite Changes and Sleep:     She reports normal sleep, normal appetite, normal energy level    Current Rating Scores:     None completed today  Review Of Systems:      Constitutional negative   ENT negative   Cardiovascular negative   Respiratory negative   Gastrointestinal negative   Genitourinary negative   Musculoskeletal negative   Integumentary negative   Neurological negative   Endocrine negative   Other Symptoms none, all other systems are negative           Past Medical History:    Past Medical History:   Diagnosis Date    Anxiety     Arthritis     Back pain     Lower back    Bipolar 1 disorder (HCC)     Depression     Diverticulosis     Hip dysplasia     Bilateral    Hyperlipidemia     Knee pain, right     Obesity     Psoriasis     Along hairline      Stress incontinence      Past Medical History Pertinent Negatives:   Diagnosis Date Noted    History of transfusion 09/10/2018     Past Surgical History:   Procedure Laterality Date    COLONOSCOPY      DILATION AND CURETTAGE OF UTERUS      One D&C and one D&E    ESOPHAGOGASTRODUODENOSCOPY      NASAL SEPTUM SURGERY      AL KNEE SCOPE,MED/LAT MENISECTOMY Right 9/27/2018    Procedure: ARTHROSCOPY KNEE; PARTIAL MEDIAL MENISECTOMY; CHONDROPLASTY;  Surgeon: Jaime Mi DO;  Location: [facility];  Service: Orthopedics    TONSILECTOMY AND ADNOIDECTOMY      TONSILLECTOMY       Allergies   Allergen Reactions    Banana Hives     Itching and swelling     Crab (Diagnostic) Hives    Cleocin [Clindamycin]      Causes C Diff    Latex      Pt told not to be in contact with latex because of banana allergy    Penicillins Other (See Comments)     Causes vaginal itching and must take Diflucan with it       Substance Abuse History:    Social History     Substance and Sexual Activity   Alcohol Use Yes    Comment: 5 yearly     Social History     Substance and Sexual Activity   Drug Use No       Social History:    Social History     Socioeconomic History    Marital status: /Civil Union     Spouse name: Not on file    Number of children: Not on file    Years of education: Not on file    Highest education level: Not on file   Occupational History    Not on file   Social Needs    Financial resource strain: Not on file    Food insecurity     Worry: Not on file     Inability: Not on file   [employer] needs     Medical: Not on file     Non-medical: Not on file   Tobacco Use    Smoking status: Never Smoker    Smokeless tobacco: Never Used   Substance and Sexual Activity    Alcohol use: Yes     Comment: 5 yearly    Drug use: No    Sexual activity: Not on file   Lifestyle    Physical activity     Days per week: Not on file     Minutes per session: Not on file    Stress: Not on file   Relationships    Social connections     Talks on phone: Not on file     Gets together: Not on file     Attends Rastafarian service: Not on file     Active member of club or organization: Not on file     Attends meetings of clubs or organizations: Not on file     Relationship status: Not on file    Intimate partner violence     Fear of current or ex partner: Not on file     Emotionally abused: Not on file     Physically abused: Not on file     Forced sexual activity: Not on file   Other Topics Concern    Not on file   Social History Narrative    Not on file       Family Psychiatric History:     Family History   Problem Relation Age of Onset    Asthma Mother     No Known Problems Father     Rectal cancer Sister 62    No Known Problems Daughter     No Known Problems Maternal Grandmother     No Known Problems Maternal Grandfather     No Known Problems Paternal Grandmother     No Known Problems Paternal Grandfather     No Known Problems Paternal Aunt        History Review: Chart reviewed         OBJECTIVE:     Vital signs in last 24 hours: There were no vitals filed for this visit      Mental Status Evaluation:    Appearance age appropriate, casually dressed   Behavior cooperative, calm   Speech normal rate, normal volume, normal pitch   Mood depressed   Affect constricted   Thought Processes organized, goal directed   Associations tangential associations   Thought Content normal, no overt delusions   Perceptual Disturbances: no auditory hallucinations, no visual hallucinations   Abnormal Thoughts  Risk Potential Suicidal ideation - None  Homicidal ideation - None  Potential for aggression - No   Orientation oriented to person, place, time/date and situation   Memory recent and remote memory grossly intact   Consciousness alert and awake   Attention Span Concentration Span attention span and concentration are age appropriate   Intellect appears to be of average intelligence   Insight intact and good   Judgement intact and good   Muscle Strength and  Gait normal muscle strength and normal muscle tone, normal gait and normal balance   Motor activity no abnormal movements   Language no difficulty naming common objects, no difficulty repeating a phrase, no difficulty writing a sentence   Fund of Knowledge adequate knowledge of current events  adequate fund of knowledge regarding past history  adequate fund of knowledge regarding vocabulary    Pain none   Pain Scale 0       Laboratory Results: I have personally reviewed all pertinent laboratory/tests results    Recent Labs (last 2 months):   Lab on 03/04/2021   Component Date Value    Lithium Lvl 03/04/2021 0 5     WBC 03/04/2021 8 04     RBC 03/04/2021 5 16*    Hemoglobin 03/04/2021 13 6     Hematocrit 03/04/2021 43 4     MCV 03/04/2021 84     MCH 03/04/2021 26 4*    MCHC 03/04/2021 31 3*    RDW 03/04/2021 14 5     Platelets 49/31/4327 168     MPV 03/04/2021 11 7     Sodium 03/04/2021 141     Potassium 03/04/2021 4 1     Chloride 03/04/2021 110*    CO2 03/04/2021 26     ANION GAP 03/04/2021 5     BUN 03/04/2021 9     Creatinine 03/04/2021 1 02     Glucose, Fasting 03/04/2021 128*    Calcium 03/04/2021 9 6     AST 03/04/2021 32     ALT 03/04/2021 41     Alkaline Phosphatase 03/04/2021 78     Total Protein 03/04/2021 7 3     Albumin 03/04/2021 3 9     Total Bilirubin 03/04/2021 0 84     eGFR 03/04/2021 60     Hemoglobin A1C 03/04/2021 6 0*    EAG 03/04/2021 126     Cholesterol 03/04/2021 126     Triglycerides 03/04/2021 155*    HDL, Direct 03/04/2021 38*    LDL Calculated 03/04/2021 57     Non-HDL-Chol (CHOL-HDL) 03/04/2021 88     TSH 3RD GENERATON 03/04/2021 1 550    Orders Only on 02/24/2021   Component Date Value    SARS-CoV-2 02/24/2021 Negative        Suicide/Homicide Risk Assessment:    Risk of Harm to Self:  Based on today's assessment, Kvng Monte presents the following risk of harm to self: none    Risk of Harm to Others:  Based on today's assessment, Kvng Monte presents the following risk of harm to others: none    The following interventions are recommended: no intervention changes needed, not applicable    Assessment/Plan:       Diagnoses and all orders for this visit:    Bipolar disorder with depression ([address] )  -     gabapentin (NEURONTIN) 100 mg capsule; 2 HS  -     lithium carbonate 150 mg capsule; 1 in AM    Bipolar I disorder with depression ([facility] [address] )  -     lithium carbonate 300 mg capsule; 1 HS          Treatment Recommendations/Precautions:      Reduce lithium 450 mg daily, add gabapentin 200 mg HS, continue Klonopin 0 5 mg 2 to 3 times a day he p r n  for anxiety, Wellbutrin 75 mg daily tablets  And trazodone 100 mg HS and Effexor XR 75 mg daily  Follow-up is in  4-6 weeks  Medication management every 1 months  Aware of 24 hour and weekend coverage for urgent situations accessed by calling [facility] main practice number    Medications Risks/Benefits      Risks, Benefits And Possible Side Effects Of Medications:    Risks, benefits, and possible side effects of medications explained to Paul Culp and she verbalizes understanding and agreement for treatment  Controlled Medication Discussion:     Paul Culp has been filling controlled prescriptions on time as prescribed according to [address] [facility] Monitoring Program    Psychotherapy Provided:     Individual psychotherapy provided: Yes  Counseling was provided during the session today for 30 minutes  Took the time to discuss patient's feelings and treatment with her and her   Treatment Plan:    Completed and signed during the session: Yes - Treatment Plan done but not signed at time of office visit due to:  Plan reviewed in person and verbal consent given due to Daniel social distancing    Note Share: This note was shared with patient      RUSSELL Waters 03/09/21

## 2021-04-05 ENCOUNTER — OFFICE VISIT (OUTPATIENT)
Dept: URGENT CARE | Facility: CLINIC | Age: 61
End: 2021-04-05
Payer: COMMERCIAL

## 2021-04-05 VITALS
HEIGHT: 64 IN | HEART RATE: 63 BPM | RESPIRATION RATE: 18 BRPM | SYSTOLIC BLOOD PRESSURE: 143 MMHG | DIASTOLIC BLOOD PRESSURE: 70 MMHG | WEIGHT: 255 LBS | TEMPERATURE: 98.5 F | BODY MASS INDEX: 43.54 KG/M2 | OXYGEN SATURATION: 97 %

## 2021-04-05 DIAGNOSIS — J34.0 CELLULITIS OF NOSTRIL: Primary | ICD-10-CM

## 2021-04-05 PROCEDURE — 99213 OFFICE O/P EST LOW 20 MIN: CPT | Performed by: PHYSICIAN ASSISTANT

## 2021-04-05 RX ORDER — DOXYCYCLINE 100 MG/1
100 CAPSULE ORAL 2 TIMES DAILY
Qty: 14 CAPSULE | Refills: 0 | Status: SHIPPED | OUTPATIENT
Start: 2021-04-05 | End: 2021-04-12

## 2021-04-05 NOTE — PATIENT INSTRUCTIONS
Take doxycycline as prescribed  Wash with soap and water twice per day and apply petroleum jelly or antibiotic ointment  Watch for signs of infection  Tylenol/ibuprofen for pain  Follow up with PCP in 3-5 days  Proceed to  ER if symptoms worsen  Doxycycline may cause your skin to be more sensitive to sunlight than it is normally  Exposure to sunlight, even for short periods of time, may cause skin rash, itching, redness or other discoloration of the skin, or a severe sunburn  Practice proper precautions including avoiding excessive sunlight exposure, wear skin protection including clothing and sunscreen to avoid reaction  Eat yogurt with live and active cultures and/or take a probiotic at least 3 hours before or after antibiotic dose  Monitor stool for diarrhea and/or blood  If this occurs, contact primary care doctor ASAP  Cellulitis   WHAT YOU NEED TO KNOW:   Cellulitis is a skin infection caused by bacteria  Cellulitis may go away on its own or you may need treatment  Your healthcare provider may draw a [place] around the outside edges of your cellulitis  If your cellulitis spreads, your healthcare provider will see it outside of the [place]  DISCHARGE INSTRUCTIONS:   Call 911 if:   · You have sudden trouble breathing or chest pain  Return to the emergency department if:   · Your wound gets larger and more painful  · You feel a crackling under your skin when you touch it  · You have purple dots or bumps on your skin, or you see bleeding under your skin  · You have new swelling and pain in your legs  · The red, warm, swollen area gets larger  · You see red streaks coming from the infected area  Contact your healthcare provider if:   · You have a fever  · Your fever or pain does not go away or gets worse  · The area does not get smaller after 2 days of antibiotics  · Your skin is flaking or peeling off      · You have questions or concerns about your condition or care     Medicines:   · Antibiotics  help treat the bacterial infection  · NSAIDs , such as ibuprofen, help decrease swelling, pain, and fever  NSAIDs can cause stomach bleeding or kidney problems in certain people  If you take blood thinner medicine, always ask if NSAIDs are safe for you  Always read the medicine label and follow directions  Do not give these medicines to children under 10months of age without direction from your child's healthcare provider  · Acetaminophen  decreases pain and fever  It is available without a doctor's order  Ask how much to take and how often to take it  Follow directions  Read the labels of all other medicines you are using to see if they also contain acetaminophen, or ask your doctor or pharmacist  Acetaminophen can cause liver damage if not taken correctly  Do not use more than 4 grams (4,000 milligrams) total of acetaminophen in one day  · Take your medicine as directed  Contact your healthcare provider if you think your medicine is not helping or if you have side effects  Tell him or her if you are allergic to any medicine  Keep a list of the medicines, vitamins, and herbs you take  Include the amounts, and when and why you take them  Bring the list or the pill bottles to follow-up visits  Carry your medicine list with you in case of an emergency  Self-care:   · Elevate the area above the level of your heart  as often as you can  This will help decrease swelling and pain  Prop the area on pillows or blankets to keep it elevated comfortably  · Clean the area daily until the wound scabs over  Gently wash the area with soap and water  Pat dry  Use dressings as directed  · Place cool or warm, wet cloths on the area as directed  Use clean cloths and clean water  Leave it on the area until the cloth is room temperature  Pat the area dry with a clean, dry cloth  The cloths may help decrease pain      Prevent cellulitis:   · Do not scratch bug bites or areas of injury  You increase your risk for cellulitis by scratching these areas  · Do not share personal items, such as towels, clothing, and razors  · Clean exercise equipment  with germ-killing  before and after you use it  · Wash your hands often  Use soap and water  Wash your hands after you use the bathroom, change a child's diapers, or sneeze  Wash your hands before you prepare or eat food  Use lotion to prevent dry, cracked skin  · Wear pressure stockings as directed  You may be told to wear the stockings if you have peripheral edema  The stockings improve blood flow and decrease swelling  · Treat athlete's foot  This can help prevent the spread of a bacterial skin infection  Follow up with your healthcare provider within 3 days, or as directed: Your healthcare provider will check if your cellulitis is getting better  You may need different medicine  Write down your questions so you remember to ask them during your visits  © Copyright [address] Information is for End User's use only and may not be sold, redistributed or otherwise used for commercial purposes  All illustrations and images included in CareNotes® are the copyrighted property of A D A M , Inc  or [facility] Shawnee Morales   The above information is an  only  It is not intended as medical advice for individual conditions or treatments  Talk to your doctor, nurse or pharmacist before following any medical regimen to see if it is safe and effective for you

## 2021-04-05 NOTE — PROGRESS NOTES
[facility]        NAME: Bushra Joseph is a 63 y o  female  : 1960    MRN: [id number]  DATE: 2021  TIME: 1:54 PM    Assessment and Plan   Cellulitis of nostril [J34 0]  1  Cellulitis of nostril  doxycycline monohydrate (MONODOX) 100 mg capsule         Patient Instructions     Take doxycycline as prescribed  Wash with soap and water twice per day and apply petroleum jelly or antibiotic ointment  Watch for signs of infection  Tylenol/ibuprofen for pain  Follow up with PCP in 3-5 days  Proceed to  ER if symptoms worsen  Doxycycline may cause your skin to be more sensitive to sunlight than it is normally  Exposure to sunlight, even for short periods of time, may cause skin rash, itching, redness or other discoloration of the skin, or a severe sunburn  Practice proper precautions including avoiding excessive sunlight exposure, wear skin protection including clothing and sunscreen to avoid reaction  Eat yogurt with live and active cultures and/or take a probiotic at least 3 hours before or after antibiotic dose  Monitor stool for diarrhea and/or blood  If this occurs, contact primary care doctor ASAP  Chief Complaint     Chief Complaint   Patient presents with    Recurrent Skin Infections     c/o left nostril cellulitis per pt, also states "I was here a few weeks ago and had the same thing on the right side "  Painful symptoms began on Friday  History of Present Illness       Patient reports erythema, pain and swelling of L nostril  Reports similar prior symptoms within the past 3 months that resolved with keflex  States she wears masks for a week without washing  Denies URI sx or contacts with similar symptoms  No fevers or chills  Review of Systems   Review of Systems   Constitutional: Negative for chills and fever  HENT: Negative  Skin: Positive for color change           Current Medications       Current Outpatient Medications:     ALPRAZolam (XANAX) 0 5 mg tablet, TAKE 1 TABLET BY MOUTH  TWICE DAILY AS NEEDED FOR  ANXIETY, Disp: 180 tablet, Rfl: 1    buPROPion (WELLBUTRIN) 75 mg tablet, Take 1 tablet (75 mg total) by mouth daily, Disp: 30 tablet, Rfl: 1    clonazePAM (KlonoPIN) 0 5 mg tablet, TAKE 1 TABLET BY MOUTH 3 TO 4 TIMES DAILY, Disp: 180 tablet, Rfl: 1    gabapentin (NEURONTIN) 100 mg capsule, 2 HS, Disp: 60 capsule, Rfl: 1    lithium carbonate 150 mg capsule, 1 in AM, Disp: 30 capsule, Rfl: 1    lithium carbonate 300 mg capsule, 1 HS, Disp: 180 capsule, Rfl: 2    Multiple Vitamin (MULTIVITAMIN) capsule, Take 1 capsule by mouth daily, Disp: , Rfl:     oxybutynin (DITROPAN-XL) 10 MG 24 hr tablet, Take 10 mg by mouth daily at bedtime, Disp: , Rfl:     traZODone (DESYREL) 100 mg tablet, TAKE 1 TO 2 TABLETS BY  MOUTH AT BEDTIME, Disp: 180 tablet, Rfl: 3    venlafaxine (EFFEXOR-XR) 75 mg 24 hr capsule, TAKE 2 CAPSULES BY MOUTH  DAILY, Disp: 180 capsule, Rfl: 3    acetaminophen (TYLENOL) 500 mg tablet, Take 500 mg by mouth every 6 (six) hours as needed for mild pain, Disp: , Rfl:     aspirin (ECOTRIN) 325 mg EC tablet, Take 1 tablet (325 mg total) by mouth 2 (two) times a day (Patient not taking: Reported on 2/21/2021), Disp: 28 tablet, Rfl: 0    doxycycline monohydrate (MONODOX) 100 mg capsule, Take 1 capsule (100 mg total) by mouth 2 (two) times a day for 7 days, Disp: 14 capsule, Rfl: 0    Current Allergies     Allergies as of 04/05/2021 - Reviewed 04/05/2021   Allergen Reaction Noted    Banana - food allergy Hives 09/27/2018    Crab (diagnostic) - food allergy Hives 09/10/2018    Cleocin [clindamycin]  09/27/2018    Latex - food allergy  09/27/2018    Penicillins Other (See Comments) 07/11/2018            The following portions of the patient's history were reviewed and updated as appropriate: allergies, current medications, past family history, past medical history, past social history, past surgical history and problem list      Past Medical History:   Diagnosis Date    Anxiety     Arthritis     Back pain     Lower back    Bipolar 1 disorder (HCC)     Depression     Diverticulosis     Hip dysplasia     Bilateral    Hyperlipidemia     Knee pain, right     Obesity     Psoriasis     Along hairline   Stress incontinence        Past Surgical History:   Procedure Laterality Date    COLONOSCOPY      DILATION AND CURETTAGE OF UTERUS      One D&C and one D&E    ESOPHAGOGASTRODUODENOSCOPY      NASAL SEPTUM SURGERY      SC KNEE SCOPE,MED/LAT MENISECTOMY Right 9/27/2018    Procedure: ARTHROSCOPY KNEE; PARTIAL MEDIAL MENISECTOMY; CHONDROPLASTY;  Surgeon: Alicja William DO;  Location: [facility];  Service: Orthopedics    TONSILECTOMY AND ADNOIDECTOMY      TONSILLECTOMY         Family History   Problem Relation Age of Onset    Asthma Mother     No Known Problems Father     Rectal cancer Sister 62    No Known Problems Daughter     No Known Problems Maternal Grandmother     No Known Problems Maternal Grandfather     No Known Problems Paternal Grandmother     No Known Problems Paternal Grandfather     No Known Problems Paternal Aunt          Medications have been verified  Objective   /70 (BP Location: Left arm, Patient Position: Sitting, Cuff Size: Adult)   Pulse 63   Temp 98 5 °F (36 9 °C) (Temporal)   Resp 18   Ht 5' 4" (1 626 m)   Wt 116 kg (255 lb)   SpO2 97%   BMI 43 77 kg/m²   No LMP recorded  Patient is postmenopausal        Physical Exam     Physical Exam  Vitals signs reviewed  Constitutional:       General: She is not in acute distress  Appearance: She is well-developed  She is not diaphoretic  HENT:      Head: Normocephalic  Right Ear: External ear normal       Left Ear: External ear normal       Mouth/Throat:      Pharynx: No oropharyngeal exudate  Eyes:      Conjunctiva/sclera: Conjunctivae normal    Cardiovascular:      Rate and Rhythm: Normal rate and regular rhythm        Heart sounds: Normal heart sounds  No murmur  No friction rub  No gallop  Pulmonary:      Effort: Pulmonary effort is normal  No respiratory distress  Breath sounds: Normal breath sounds  No wheezing or rales  Chest:      Chest wall: No tenderness  Lymphadenopathy:      Cervical: No cervical adenopathy  Skin:     General: Skin is warm  Neurological:      Mental Status: She is alert and oriented to person, place, and time  Psychiatric:         Behavior: Behavior normal          Thought Content:  Thought content normal          Judgment: Judgment normal

## 2021-04-09 ENCOUNTER — TELEPHONE (OUTPATIENT)
Dept: PSYCHIATRY | Facility: CLINIC | Age: 61
End: 2021-04-09

## 2021-04-09 DIAGNOSIS — R37 SEXUAL DYSFUNCTION: ICD-10-CM

## 2021-04-09 RX ORDER — BUPROPION HYDROCHLORIDE 75 MG/1
75 TABLET ORAL DAILY
Qty: 30 TABLET | Refills: 1 | Status: SHIPPED | OUTPATIENT
Start: 2021-04-09 | End: 2021-04-13 | Stop reason: SDUPTHER

## 2021-04-09 NOTE — TELEPHONE ENCOUNTER
Pt needs a refill of her Wellbutrin  Dolores Engel gets it through [employer]    having problems getting it filled  Pt only has 2 pills left    Needs to get this refilled

## 2021-04-13 ENCOUNTER — TELEPHONE (OUTPATIENT)
Dept: PSYCHIATRY | Facility: CLINIC | Age: 61
End: 2021-04-13

## 2021-04-13 DIAGNOSIS — R37 SEXUAL DYSFUNCTION: ICD-10-CM

## 2021-04-13 DIAGNOSIS — F31.9 BIPOLAR DISORDER WITH DEPRESSION (HCC): ICD-10-CM

## 2021-04-13 DIAGNOSIS — F31.9 BIPOLAR I DISORDER WITH DEPRESSION (HCC): ICD-10-CM

## 2021-04-13 RX ORDER — BUPROPION HYDROCHLORIDE 75 MG/1
75 TABLET ORAL DAILY
Qty: 90 TABLET | Refills: 0 | Status: SHIPPED | OUTPATIENT
Start: 2021-04-13 | End: 2021-06-29

## 2021-04-13 NOTE — TELEPHONE ENCOUNTER
Rudolph Arreguin sees Elizabeth Cota, she's due for refills with [facility]  But it looks like her 3 prescriptions were sent to [facility] instead      She's requesting mail order for bupropion,lithium 150mg ,gabapentin

## 2021-04-13 NOTE — PROGRESS NOTES
RUSSELL spoke with client and she reported that she will be seeing Calderon Bernal next week and at this time the only mail order medication that she needs is the Wellbutrin  RUSSELL sent script for 90 no refill to [facility]

## 2021-04-20 ENCOUNTER — OFFICE VISIT (OUTPATIENT)
Dept: PSYCHIATRY | Facility: CLINIC | Age: 61
End: 2021-04-20
Payer: COMMERCIAL

## 2021-04-20 DIAGNOSIS — F31.9 BIPOLAR DISORDER WITH DEPRESSION (HCC): ICD-10-CM

## 2021-04-20 PROCEDURE — 99214 OFFICE O/P EST MOD 30 MIN: CPT | Performed by: NURSE PRACTITIONER

## 2021-04-20 PROCEDURE — 1036F TOBACCO NON-USER: CPT | Performed by: NURSE PRACTITIONER

## 2021-04-20 RX ORDER — LITHIUM CARBONATE 150 MG/1
CAPSULE ORAL
Qty: 30 CAPSULE | Refills: 0 | Status: SHIPPED | OUTPATIENT
Start: 2021-04-20 | End: 2021-04-21 | Stop reason: SDUPTHER

## 2021-04-20 RX ORDER — GABAPENTIN 300 MG/1
CAPSULE ORAL
Qty: 60 CAPSULE | Refills: 2 | Status: SHIPPED | OUTPATIENT
Start: 2021-04-20 | End: 2021-06-15

## 2021-04-20 NOTE — PSYCH
MEDICATION MANAGEMENT NOTE        [address]      Name and Date of Birth:  Herminio Louis 63 y o  1960 MRN: [id number]    Date of Visit: April 20, 2021    Reason for Visit:   A medication management appointment for treatment of bipolar depressive disorder  SUBJECTIVE:  Patient is a 65-year-old female has history of bipolar depression  Last visit when she was here, she was very sluggish, feeling very robin and very anxious with excessive worry and fear  She also expressed desire at that time to consider coming off of lithium  In view of her symptoms, it was decided that we start titration off of lithium and titration on to gabapentin  This was chosen to help her mood as well as her severe anxiety  She has responded very well to the addition of gabapentin  She is on a dose of 200 mg and we will increase the dose up to 600 mg over the next 2 weeks  In that time frame, she will also come off lithium  She will continue with her other medications of Effexor XR 75 mg daily and Wellbutrin tablet 75 mg daily  She will also take trazodone 100-200 mg HS  We will follow up again with her in June  If she has any issues in the meantime, she has been encouraged to give us a call  For now, the medication is working well in her mood is improving  She denies suicidal ideation, intent or plan at present; denies homicidal ideation, intent or plan at present  She denies auditory hallucinations, denies visual hallucinations, denies delusions  She denies any side effects from medications  HPI ROS Appetite Changes and Sleep:     She reports normal sleep, normal appetite, normal energy level    Current Rating Scores:     None completed today      Review Of Systems:      Constitutional negative   ENT negative   Cardiovascular negative   Respiratory negative   Gastrointestinal negative   Genitourinary negative   Musculoskeletal negative   Integumentary negative Neurological negative   Endocrine negative   Other Symptoms none, all other systems are negative           Past Medical History:    Past Medical History:   Diagnosis Date    Anxiety     Arthritis     Back pain     Lower back    Bipolar 1 disorder (HCC)     Depression     Diverticulosis     Hip dysplasia     Bilateral    Hyperlipidemia     Knee pain, right     Obesity     Psoriasis     Along hairline      Stress incontinence      Past Medical History Pertinent Negatives:   Diagnosis Date Noted    History of transfusion 09/10/2018     Past Surgical History:   Procedure Laterality Date    COLONOSCOPY      DILATION AND CURETTAGE OF UTERUS      One D&C and one D&E    ESOPHAGOGASTRODUODENOSCOPY      NASAL SEPTUM SURGERY      [place] KNEE SCOPE,MED/LAT MENISECTOMY Right 9/27/2018    Procedure: ARTHROSCOPY KNEE; PARTIAL MEDIAL MENISECTOMY; CHONDROPLASTY;  Surgeon: Jocelynn Schuler DO;  Location: [facility];  Service: Orthopedics    TONSILECTOMY AND ADNOIDECTOMY      TONSILLECTOMY       Allergies   Allergen Reactions    Banana - Food Allergy Hives     Itching and swelling     Crab (Diagnostic) - Food Allergy Hives    Cleocin [Clindamycin]      Causes C Diff    Latex      Pt told not to be in contact with latex because of banana allergy    Penicillins Other (See Comments)     Causes vaginal itching and must take Diflucan with it       Substance Abuse History:    Social History     Substance and Sexual Activity   Alcohol Use Yes    Comment: 5 yearly     Social History     Substance and Sexual Activity   Drug Use No       Social History:    Social History     Socioeconomic History    Marital status: /Civil Union     Spouse name: Not on file    Number of children: Not on file    Years of education: Not on file    Highest education level: Not on file   Occupational History    Not on file   Social Needs    Financial resource strain: Not on file    Food insecurity     Worry: Not on file Inability: Not on file    Transportation needs     Medical: Not on file     Non-medical: Not on file   Tobacco Use    Smoking status: Never Smoker    Smokeless tobacco: Never Used   Substance and Sexual Activity    Alcohol use: Yes     Comment: 5 yearly    Drug use: No    Sexual activity: Not on file   Lifestyle    Physical activity     Days per week: Not on file     Minutes per session: Not on file    Stress: Not on file   Relationships    Social connections     Talks on phone: Not on file     Gets together: Not on file     Attends Latter day service: Not on file     Active member of club or organization: Not on file     Attends meetings of clubs or organizations: Not on file     Relationship status: Not on file    Intimate partner violence     Fear of current or ex partner: Not on file     Emotionally abused: Not on file     Physically abused: Not on file     Forced sexual activity: Not on file   Other Topics Concern    Not on file   Social History Narrative    Not on file       Family Psychiatric History:     Family History   Problem Relation Age of Onset    Asthma Mother     No Known Problems Father     Rectal cancer Sister 62    No Known Problems Daughter     No Known Problems Maternal Grandmother     No Known Problems Maternal Grandfather     No Known Problems Paternal Grandmother     No Known Problems Paternal Grandfather     No Known Problems Paternal Aunt        History Review: Chart reviewed         OBJECTIVE:     Vital signs in last 24 hours: There were no vitals filed for this visit  Mental Status Evaluation:    Appearance age appropriate, casually dressed   Behavior cooperative, calm   Speech normal rate, normal volume, normal pitch   Mood Improving     Affect brighter   Thought Processes organized, goal directed   Associations intact associations   Thought Content no overt delusions   Perceptual Disturbances: no auditory hallucinations, no visual hallucinations   Abnormal Thoughts  Risk Potential Suicidal ideation - None  Homicidal ideation - None  Potential for aggression - No   Orientation oriented to person, place, time/date and situation   Memory recent and remote memory grossly intact   Consciousness alert and awake   Attention Span Concentration Span attention span and concentration are age appropriate   Intellect appears to be of average intelligence   Insight intact   Judgement intact   Muscle Strength and  Gait normal muscle strength and normal muscle tone, normal gait and normal balance   Motor activity no abnormal movements   Language no difficulty naming common objects, no difficulty repeating a phrase, no difficulty writing a sentence   Fund of Knowledge adequate knowledge of current events  adequate fund of knowledge regarding past history  adequate fund of knowledge regarding vocabulary    Pain none   Pain Scale 0       Laboratory Results: I have personally reviewed all pertinent laboratory/tests results    Most Recent Labs:   Lab Results   Component Value Date    WBC 8 04 03/04/2021    RBC 5 16 (H) 03/04/2021    HGB 13 6 03/04/2021    HCT 43 4 03/04/2021     03/04/2021    RDW 14 5 03/04/2021    NEUTROABS 4 20 07/11/2019    K 4 1 03/04/2021     (H) 03/04/2021    CO2 26 03/04/2021    BUN 9 03/04/2021    CREATININE 1 02 03/04/2021    CALCIUM 9 6 03/04/2021    AST 32 03/04/2021    ALT 41 03/04/2021    ALKPHOS 78 03/04/2021    HDL 38 (L) 03/04/2021    TRIG 155 (H) 03/04/2021    LDLCALC 57 03/04/2021    LITHIUM 0 5 03/04/2021    [id number] 1 550 03/04/2021       Suicide/Homicide Risk Assessment:    Risk of Harm to Self:  Based on today's assessment, Myra Cavanaugh presents the following risk of harm to self: none    Risk of Harm to Others:  Based on today's assessment, Myra Cavanaugh presents the following risk of harm to others: none    The following interventions are recommended: no intervention changes needed    Assessment/Plan:       Diagnoses and all orders for this visit:    Bipolar disorder with depression ([facility] [address] )  -     gabapentin (NEURONTIN) 300 mg capsule; 1 at 5PM and 1 HS  -     lithium carbonate 150 mg capsule; Take as directed          Treatment Recommendations/Precautions:      Continue titration off lithium and increase gabapentin 300 mg b i d , continue Wellbutrin tablet 75 mg daily, venlafaxine XR 75 mg daily,  Klonopin 0 5 mg t i d  p r n  for severe anxiety and follow-up will be in 8 weeks  Aware of 24 hour and weekend coverage for urgent situations accessed by calling [facility] main practice number    Medications Risks/Benefits      Risks, Benefits And Possible Side Effects Of Medications:    Risks, benefits, and possible side effects of medications explained to Jesus Reyna and she verbalizes understanding and agreement for treatment  Controlled Medication Discussion:     Jesus Reyna has been filling controlled prescriptions on time as prescribed according to [address] [place] [employer]    Psychotherapy Provided:     Individual psychotherapy provided: No     Treatment Plan:    Completed and signed during the session: Yes - Treatment Plan done but not signed at time of office visit due to:  Plan reviewed in person and verbal consent given due to Daniel social [email or web address]    Note Share: This note was shared with patient      Arthea Councilman, CRNP 04/20/21

## 2021-04-21 DIAGNOSIS — F31.9 BIPOLAR DISORDER WITH DEPRESSION (HCC): ICD-10-CM

## 2021-04-21 RX ORDER — LITHIUM CARBONATE 150 MG/1
CAPSULE ORAL
Qty: 30 CAPSULE | Refills: 0 | Status: SHIPPED | OUTPATIENT
Start: 2021-04-21 | End: 2021-06-15 | Stop reason: ALTCHOICE

## 2021-04-21 RX ORDER — LITHIUM CARBONATE 150 MG/1
CAPSULE ORAL
Qty: 30 CAPSULE | Refills: 0 | OUTPATIENT
Start: 2021-04-21

## 2021-05-07 ENCOUNTER — OFFICE VISIT (OUTPATIENT)
Dept: OBGYN CLINIC | Facility: MEDICAL CENTER | Age: 61
End: 2021-05-07
Payer: COMMERCIAL

## 2021-05-07 VITALS
DIASTOLIC BLOOD PRESSURE: 77 MMHG | HEIGHT: 64 IN | TEMPERATURE: 98.3 F | SYSTOLIC BLOOD PRESSURE: 134 MMHG | BODY MASS INDEX: 43.94 KG/M2 | WEIGHT: 257.4 LBS | HEART RATE: 57 BPM

## 2021-05-07 DIAGNOSIS — G89.29 CHRONIC PAIN OF RIGHT KNEE: ICD-10-CM

## 2021-05-07 DIAGNOSIS — M17.11 PRIMARY OSTEOARTHRITIS OF RIGHT KNEE: Primary | ICD-10-CM

## 2021-05-07 DIAGNOSIS — M25.561 CHRONIC PAIN OF RIGHT KNEE: ICD-10-CM

## 2021-05-07 PROCEDURE — 3008F BODY MASS INDEX DOCD: CPT | Performed by: NURSE PRACTITIONER

## 2021-05-07 PROCEDURE — 99213 OFFICE O/P EST LOW 20 MIN: CPT | Performed by: ORTHOPAEDIC SURGERY

## 2021-05-07 NOTE — PROGRESS NOTES
Assessment/Plan:  1  Primary osteoarthritis of right knee    2  Chronic pain of right knee      Orders Placed This Encounter   Procedures    Ambulatory referral to Physical Therapy    Injection procedure prior authorization       · Order placed for right knee VS injection which can be administered at any time  · PT referral placed  Patient may go several times for a refresher of the exercises then transition to home exercises  · Continue naproxen for pain prn  · ACE wrap provided to wear for compression/ comfort  May consider knee sleeve  Return for right knee VS injection  I answered all of the patient's questions during the visit and provided education of the patient's condition during the visit  The patient verbalized understanding of the information given and agrees with the plan  This note was dictated using [employer] software  It may contain errors including improperly dictated words  Please contact physician directly for any questions  Subjective   Chief Complaint:   Chief Complaint   Patient presents with    Right Knee - Follow-up       HPI  Richelle Baig is a 63 y o  female who presents for follow up for right knee pain  Patient received right knee steroid injection on 8/10/20 and cannot recall if it provided pain relief  Patient reports persistent pain in the right knee  States pain is located on the medial and posterior aspects of the knee and is worse at night time  Patient states her pain will wake her up from sleeping  She has tried a variety of different pillows around her knee with no improvement  Patient does feel her knee locks on her at times  Patient is taking naproxen for pain with relief  She is not doing PT or home exercises for her knee  She recently did PT for the hip which helped significantly  She is not wearing a knee brace  Review of Systems  ROS:    See HPI for musculoskeletal review     All other systems reviewed are negative     History:  Past Medical History: Diagnosis Date    Anxiety     Arthritis     Back pain     Lower back    Bipolar 1 disorder (HCC)     Depression     Diverticulosis     Hip dysplasia     Bilateral    Hyperlipidemia     Knee pain, right     Obesity     Psoriasis     Along hairline      Stress incontinence      Past Surgical History:   Procedure Laterality Date    COLONOSCOPY      DILATION AND CURETTAGE OF UTERUS      One D&C and one D&E    ESOPHAGOGASTRODUODENOSCOPY      NASAL SEPTUM SURGERY      [place] KNEE SCOPE,MED/LAT MENISECTOMY Right 9/27/2018    Procedure: ARTHROSCOPY KNEE; PARTIAL MEDIAL MENISECTOMY; CHONDROPLASTY;  Surgeon: Serge Vernon DO;  Location: [facility];  Service: Orthopedics    TONSILECTOMY AND ADNOIDECTOMY      TONSILLECTOMY       Social History   Social History     Substance and Sexual Activity   Alcohol Use Yes    Comment: 5 yearly     Social History     Substance and Sexual Activity   Drug Use No     Social History     Tobacco Use   Smoking Status Never Smoker   Smokeless Tobacco Never Used     Family History:   Family History   Problem Relation Age of Onset    Asthma Mother     No Known Problems Father     Rectal cancer Sister 62    No Known Problems Daughter     No Known Problems Maternal Grandmother     No Known Problems Maternal Grandfather     No Known Problems Paternal Grandmother     No Known Problems Paternal Grandfather     No Known Problems Paternal Aunt        Current Outpatient Medications on File Prior to Visit   Medication Sig Dispense Refill    acetaminophen (TYLENOL) 500 mg tablet Take 500 mg by mouth every 6 (six) hours as needed for mild pain      ALPRAZolam (XANAX) 0 5 mg tablet TAKE 1 TABLET BY MOUTH  TWICE DAILY AS NEEDED FOR  ANXIETY 180 tablet 1    aspirin (ECOTRIN) 325 mg EC tablet Take 1 tablet (325 mg total) by mouth 2 (two) times a day (Patient not taking: Reported on 2/21/2021) 28 tablet 0    buPROPion (WELLBUTRIN) 75 mg tablet Take 1 tablet (75 mg total) by mouth daily 90 tablet 0    clonazePAM (KlonoPIN) 0 5 mg tablet TAKE 1 TABLET BY MOUTH 3 TO 4 TIMES DAILY 180 tablet 1    gabapentin (NEURONTIN) 300 mg capsule 1 at 5PM and 1 HS 60 capsule 2    lithium carbonate 150 mg capsule 1 BID for 1 week,then 1 Daily for 2 weeks and stop 30 capsule 0    Multiple Vitamin (MULTIVITAMIN) capsule Take 1 capsule by mouth daily      oxybutynin (DITROPAN-XL) 10 MG 24 hr tablet Take 10 mg by mouth daily at bedtime      traZODone (DESYREL) 100 mg tablet TAKE 1 TO 2 TABLETS BY  MOUTH AT BEDTIME 180 tablet 3    venlafaxine (EFFEXOR-XR) 75 mg 24 hr capsule TAKE 2 CAPSULES BY MOUTH  DAILY 180 capsule 3     No current facility-administered medications on file prior to visit        Allergies   Allergen Reactions    Banana - Food Allergy Hives     Itching and swelling     Crab (Diagnostic) - Food Allergy Hives    Cleocin [Clindamycin]      Causes C Diff    Latex      Pt told not to be in contact with latex because of banana allergy    Penicillins Other (See Comments)     Causes vaginal itching and must take Diflucan with it        Objective     /77   Pulse 57   Temp 98 3 °F (36 8 °C)   Ht 5' 4" (1 626 m)   Wt 117 kg (257 lb 6 4 oz)   BMI 44 18 kg/m²      PE:  AAOx 3  WDWN  Hearing intact, no drainage from eyes  no audible wheezing  no abdominal distension  LE compartments soft, skin intact    Ortho Exam:  right Knee:   No erythema  no swelling  no effusion  no warmth  +TTP over medial joint line and pes bursa  AROM: 0- 120  Stable to varus/valgus stress

## 2021-05-17 ENCOUNTER — OFFICE VISIT (OUTPATIENT)
Dept: OBGYN CLINIC | Facility: MEDICAL CENTER | Age: 61
End: 2021-05-17
Payer: COMMERCIAL

## 2021-05-17 VITALS — DIASTOLIC BLOOD PRESSURE: 71 MMHG | TEMPERATURE: 97.9 F | HEART RATE: 67 BPM | SYSTOLIC BLOOD PRESSURE: 114 MMHG

## 2021-05-17 DIAGNOSIS — M17.11 PRIMARY OSTEOARTHRITIS OF RIGHT KNEE: Primary | ICD-10-CM

## 2021-05-17 PROCEDURE — 20610 DRAIN/INJ JOINT/BURSA W/O US: CPT | Performed by: PHYSICIAN ASSISTANT

## 2021-05-17 NOTE — PROGRESS NOTES
Assessment/Plan:  1  Primary osteoarthritis of right knee      Orders Placed This Encounter   Procedures    Large joint arthrocentesis       · Patient received right knee Durolane injection in the office today  Tolerated the procedure well  Advised to apply ice and avoid strenuous activity for 1-2 days as needed  · Continue naproxen prn pain  · Continue home exercises  Return in about 2 months (around 7/17/2021) for right knee follow up  I answered all of the patient's questions during the visit and provided education of the patient's condition during the visit  The patient verbalized understanding of the information given and agrees with the plan  This note was dictated using [employer] software  It may contain errors including improperly dictated words  Please contact physician directly for any questions  Subjective   Chief Complaint:   Chief Complaint   Patient presents with    Right Knee - Follow-up       HPI  Eric Chopra is a 63 y o  female who presents for follow up for  Right knee pain due to osteoarthritis  Patient states she is having pain over the anterior medial aspect of the right knee and worse at night  She does state locking at times  She is taking naproxen as needed for pain with relief  She is here today for right knee  [place]  injection  Review of Systems  ROS:    See HPI for musculoskeletal review  All other systems reviewed are negative     History:  Past Medical History:   Diagnosis Date    Anxiety     Arthritis     Back pain     Lower back    Bipolar 1 disorder (HCC)     Depression     Diverticulosis     Hip dysplasia     Bilateral    Hyperlipidemia     Knee pain, right     Obesity     Psoriasis     Along hairline      Stress incontinence      Past Surgical History:   Procedure Laterality Date    COLONOSCOPY      DILATION AND CURETTAGE OF UTERUS      One D&C and one D&E    ESOPHAGOGASTRODUODENOSCOPY      NASAL SEPTUM SURGERY      [place] KNEE SCOPE,MED/LAT MENISECTOMY Right 9/27/2018    Procedure: ARTHROSCOPY KNEE; PARTIAL MEDIAL MENISECTOMY; CHONDROPLASTY;  Surgeon: Terence Davila DO;  Location[facility];  Service: Orthopedics    TONSILECTOMY AND ADNOIDECTOMY      TONSILLECTOMY       Social History   Social History     Substance and Sexual Activity   Alcohol Use Yes    Comment: 5 yearly     Social History     Substance and Sexual Activity   Drug Use No     Social History     Tobacco Use   Smoking Status Never Smoker   Smokeless Tobacco Never Used     Family History:   Family History   Problem Relation Age of Onset    Asthma Mother     No Known Problems Father     Rectal cancer Sister 62    No Known Problems Daughter     No Known Problems Maternal Grandmother     No Known Problems Maternal Grandfather     No Known Problems Paternal Grandmother     No Known Problems Paternal Grandfather     No Known Problems Paternal Aunt        Current Outpatient Medications on File Prior to Visit   Medication Sig Dispense Refill    ALPRAZolam (XANAX) 0 5 mg tablet TAKE 1 TABLET BY MOUTH  TWICE DAILY AS NEEDED FOR  ANXIETY 180 tablet 1    buPROPion (WELLBUTRIN) 75 mg tablet Take 1 tablet (75 mg total) by mouth daily 90 tablet 0    clonazePAM (KlonoPIN) 0 5 mg tablet TAKE 1 TABLET BY MOUTH 3 TO 4 TIMES DAILY 180 tablet 1    gabapentin (NEURONTIN) 300 mg capsule 1 at 5PM and 1 HS 60 capsule 2    Multiple Vitamin (MULTIVITAMIN) capsule Take 1 capsule by mouth daily      oxybutynin (DITROPAN-XL) 10 MG 24 hr tablet Take 10 mg by mouth daily at bedtime      traZODone (DESYREL) 100 mg tablet TAKE 1 TO 2 TABLETS BY  MOUTH AT BEDTIME 180 tablet 3    venlafaxine (EFFEXOR-XR) 75 mg 24 hr capsule TAKE 2 CAPSULES BY MOUTH  DAILY 180 capsule 3    acetaminophen (TYLENOL) 500 mg tablet Take 500 mg by mouth every 6 (six) hours as needed for mild pain      aspirin (ECOTRIN) 325 mg EC tablet Take 1 tablet (325 mg total) by mouth 2 (two) times a day (Patient not taking: Reported on 2/21/2021) 28 tablet 0    lithium carbonate 150 mg capsule 1 BID for 1 week,then 1 Daily for 2 weeks and stop (Patient not taking: Reported on 5/17/2021) 30 capsule 0     No current facility-administered medications on file prior to visit  Allergies   Allergen Reactions    Banana - Food Allergy Hives     Itching and swelling     Crab (Diagnostic) - Food Allergy Hives    Cleocin [Clindamycin]      Causes C Diff    Latex      Pt told not to be in contact with latex because of banana allergy    Penicillins Other (See Comments)     Causes vaginal itching and must take Diflucan with it        Objective     /71   Pulse 67   Temp 97 9 °F (36 6 °C)      PE:  AAOx 3  WDWN  Hearing intact, no drainage from eyes  no audible wheezing  no abdominal distension  LE compartments soft, skin intact    Ortho Exam:  right Knee:   No erythema  no swelling  no effusion  no warmth  +TTP over medial joint line  AROM: 0- 115  Stable to varus/valgus stress      Large joint arthrocentesis: R knee  Universal Protocol:  Consent: Verbal consent obtained    Risks and benefits: risks, benefits and alternatives were discussed  Consent given by: patient  Site marked: the operative site was marked  Supporting Documentation  Indications: pain   Procedure Details  Location: knee - R knee  Preparation: Patient was prepped and draped in the usual sterile fashion  Needle size: 22 G  Ultrasound guidance: no  Approach: anterolateral  Medications administered: 3 mL sodium hyaluronate 60 MG/3ML    Patient tolerance: patient tolerated the procedure well with no immediate complications  Dressing:  Sterile dressing applied

## 2021-05-18 ENCOUNTER — TELEPHONE (OUTPATIENT)
Dept: OBGYN CLINIC | Facility: HOSPITAL | Age: 61
End: 2021-05-18

## 2021-05-18 NOTE — TELEPHONE ENCOUNTER
Patient had a gel injection in her right knee with Dr Daria Woods) yesterday  Patient had vaginal and genital itching and left hand and thumb tching after the injection yesterday  She took a benedryl and it went away shortly after  She's fine today  She just wanted to let the doctor know      Callback IR#[phone]

## 2021-05-18 NOTE — TELEPHONE ENCOUNTER
Spoke with patient  She took benadryl and itching went away  None today  No swelling of the lips or tongue, rash, or trouble breathing  She is icing her knee  No other questions at this time

## 2021-06-07 DIAGNOSIS — M17.11 PRIMARY OSTEOARTHRITIS OF RIGHT KNEE: Primary | ICD-10-CM

## 2021-06-10 ENCOUNTER — EVALUATION (OUTPATIENT)
Dept: PHYSICAL THERAPY | Facility: CLINIC | Age: 61
End: 2021-06-10
Payer: COMMERCIAL

## 2021-06-10 DIAGNOSIS — M94.261 CHONDROMALACIA OF KNEE, RIGHT: ICD-10-CM

## 2021-06-10 DIAGNOSIS — M17.11 PRIMARY OSTEOARTHRITIS OF RIGHT KNEE: Primary | ICD-10-CM

## 2021-06-10 PROCEDURE — 97161 PT EVAL LOW COMPLEX 20 MIN: CPT | Performed by: PHYSICAL THERAPIST

## 2021-06-10 PROCEDURE — 97535 SELF CARE MNGMENT TRAINING: CPT | Performed by: PHYSICAL THERAPIST

## 2021-06-10 PROCEDURE — 97110 THERAPEUTIC EXERCISES: CPT | Performed by: PHYSICAL THERAPIST

## 2021-06-10 NOTE — PROGRESS NOTES
PT Evaluation     Today's date: 6/10/2021  Patient name: Richelle Baig  : 1960  MRN: [id number]  Referring provider: Chantel Vera DO  Dx:   Encounter Diagnosis     ICD-10-CM    1  Primary osteoarthritis of right knee  M17 11    2  Chondromalacia of knee, right  M94 261                   Assessment  Assessment details: Pt is a 57 yoF who presents with R knee pain  She reports pain while sleeping and sitting  She demonstrates strength and ROM deficits in B LE's  She presents with S/S consistent with Chondromalacia Patella  She would benefit from skilled Physical Therapy to improve the aforementioned deficits progressing to improved function  Impairments: activity intolerance, impaired physical strength, lacks appropriate home exercise program and pain with function    Goals  ST  Decrease pain by 25%  2  Sit 30'  3  Initiate HEP    LT  Sit 2 hours  2  Less than 3 sleep disturbances/week  3  Independent with HEP    Plan  Patient would benefit from: PT eval and skilled physical therapy  Planned modality interventions: thermotherapy: hydrocollator packs, cryotherapy and unattended electrical stimulation  Planned therapy interventions: joint mobilization, manual therapy, Rahman taping, neuromuscular re-education, patient education, strengthening, stretching, flexibility, home exercise program, therapeutic activities and therapeutic exercise  Frequency: 2x week  Treatment plan discussed with: patient        Subjective Evaluation    History of Present Illness  Mechanism of injury: Pt reports she tore the meniscus in her R knee in in May 2019 when she stood up and took a step    She reports she has been in pain since  Pain  Current pain ratin  At best pain ratin  At worst pain rating: 10  Aggravating factors: sitting    Treatments  Current treatment: physical therapy  Patient Goals  Patient goals for therapy: decreased edema, decreased pain, increased strength and return to sport/leisure activities          Objective     Active Range of Motion   Left Hip   Flexion: 80 degrees   Extension: -15 degrees   External rotation (90/90): [facility]  Internal rotation (90/90): WFL    Right Hip   Flexion: 98 degrees   Extension: -17 degrees   External rotation (90/90): [facility]  Internal rotation (90/90): WFL  Left Knee   Flexion: WFL  Extension: -14 degrees     Right Knee   Flexion: WFL  Extension: -12 degrees   Left Ankle/Foot   Normal active range of motion    Right Ankle/Foot   Normal active range of motion    Patellar Static Positioning     Additional Patellar Static Positioning Details  Medial Rotation R>L    Strength/Myotome Testing     Left Hip   Planes of Motion   Flexion: 2+  Extension: 2-  Abduction: 2+  External rotation: 4  Internal rotation: 4    Right Hip   Planes of Motion   Flexion: 4-  Extension: 2-  Abduction: 2+  External rotation: 4  Internal rotation: 4    Left Knee   Flexion: 4-  Prone flexion: 2+    Right Knee   Flexion: 4-  Prone flexion: 2+    Left Ankle/Foot   Dorsiflexion: 4+  Plantar flexion: 5  Inversion: 4  Eversion: 4    Right Ankle/Foot   Dorsiflexion: 4+  Plantar flexion: 5  Inversion: 4  Eversion: 4+    Tests     Left Knee   Negative anterior Lachman, posterior sag, valgus stress test at 0 degrees and varus stress test at 0 degrees  Right Knee   Negative anterior Lachman, posterior sag, valgus stress test at 0 degrees and varus stress test at 0 degrees       Additional Tests Details  Hamstring F;exibility R: -46, L: -79  Pavel: (+) Bilaterally      Flowsheet Rows      Most Recent Value   PT/OT G-Codes   Current Score  62   Projected Score  63                Precautions L Inner thigh pain       Manuals        MFR to ITB        PF mobs                        Neuro Re-Ed         Tandem stance        SLS                                                Ther Ex        Elliptical        Hamstring Stretch 3x30" each       ITB stretch 3x30" each       Quad Set        LAQ TKE        Seated ISO hip ADD with PGB        Mini Squats        BOSU Lunges                                        Ther Activity                        Gait Training                        Modalities

## 2021-06-15 ENCOUNTER — OFFICE VISIT (OUTPATIENT)
Dept: PSYCHIATRY | Facility: CLINIC | Age: 61
End: 2021-06-15
Payer: COMMERCIAL

## 2021-06-15 DIAGNOSIS — F31.9 BIPOLAR I DISORDER WITH DEPRESSION (HCC): ICD-10-CM

## 2021-06-15 DIAGNOSIS — F31.9 BIPOLAR DISORDER WITH DEPRESSION (HCC): ICD-10-CM

## 2021-06-15 DIAGNOSIS — F33.1 MAJOR DEPRESSIVE DISORDER, RECURRENT, MODERATE (HCC): ICD-10-CM

## 2021-06-15 PROCEDURE — 1036F TOBACCO NON-USER: CPT | Performed by: NURSE PRACTITIONER

## 2021-06-15 PROCEDURE — 99214 OFFICE O/P EST MOD 30 MIN: CPT | Performed by: NURSE PRACTITIONER

## 2021-06-15 RX ORDER — VENLAFAXINE HYDROCHLORIDE 75 MG/1
75 CAPSULE, EXTENDED RELEASE ORAL DAILY
Qty: 90 CAPSULE | Refills: 3
Start: 2021-06-15 | End: 2021-10-26 | Stop reason: SDUPTHER

## 2021-06-15 RX ORDER — GABAPENTIN 300 MG/1
CAPSULE ORAL
Qty: 90 CAPSULE | Refills: 2 | Status: SHIPPED | OUTPATIENT
Start: 2021-06-15 | End: 2021-09-14 | Stop reason: SDUPTHER

## 2021-06-15 NOTE — PSYCH
MEDICATION MANAGEMENT NOTE        [facility]      Name and Date of Birth:  Herminio Louis 57 y o  1960 MRN: [id number]    Date of Visit: Kelli 15, 2021    Reason for Visit: A medication management appointment for treatment of major depressive disorder and bipolar depressive disorder  SUBJECTIVE:  Patient is a 23-year-old female who suffers from bipolar depressive disorder  Over the last several months, we have been discontinuing use of lithium in this patient and she has been  trialed on gabapentin  So far, she is not feeling any adverse effects from switch over but some mild irritability  Will increase the dose up to 900 mg per day  She is agreeable since she is not experiencing any side effect  Overall mood is stable  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  She is looking forward to her halfway years  This weekend, the family will be leaving for a vacation to [address] [place] [address] which she is looking forward to  She will continue on: Wellbutrin tablet 75 mg daily   Klonopin 0 5 mg will be reduced to 1 a day  Gabapentin 300 mg will be increased to 3 times a day for total dose of 900 mg per day   Trazodone 100 mg HS   Effexor XR 75 mg daily  Follow-up is in 3 months or sooner if necessary  She denies suicidal ideation, intent or plan; denies homicidal ideation, intent or plan  She denies auditory hallucinations, denies visual hallucinations, denies overt delusions  She denies any side effects from medications  HPI ROS Appetite Changes and Sleep:     She reports normal sleep, normal appetite, normal energy level    Current Rating Scores:     MOCA completed   Score=30/30    Review Of Systems:      Constitutional negative   ENT negative   Cardiovascular negative   Respiratory negative   Gastrointestinal negative   Genitourinary negative   Musculoskeletal negative   Integumentary negative   Neurological negative   Endocrine negative   Other Symptoms none, all other systems are negative       Past Medical History:    Past Medical History:   Diagnosis Date    Anxiety     Arthritis     Back pain     Lower back    Bipolar 1 disorder (HCC)     Depression     Diverticulosis     Hip dysplasia     Bilateral    Hyperlipidemia     Knee pain, right     Obesity     Psoriasis     Along hairline      Stress incontinence      Past Medical History Pertinent Negatives:   Diagnosis Date Noted    History of transfusion 09/10/2018     Past Surgical History:   Procedure Laterality Date    COLONOSCOPY      DILATION AND CURETTAGE OF UTERUS      One D&C and one D&E    ESOPHAGOGASTRODUODENOSCOPY      NASAL SEPTUM SURGERY      AL KNEE SCOPE,MED/LAT MENISECTOMY Right 9/27/2018    Procedure: ARTHROSCOPY KNEE; PARTIAL MEDIAL MENISECTOMY; CHONDROPLASTY;  Surgeon: Robin Chauhan DO;  Location: [facility];  Service: Orthopedics    TONSILECTOMY AND ADNOIDECTOMY      TONSILLECTOMY       Allergies   Allergen Reactions    Banana - Food Allergy Hives     Itching and swelling     Crab (Diagnostic) - Food Allergy Hives    Cleocin [Clindamycin]      Causes C Diff    Latex      Pt told not to be in contact with latex because of banana allergy    Penicillins Other (See Comments)     Causes vaginal itching and must take Diflucan with it       Substance Abuse History:    Social History     Substance and Sexual Activity   Alcohol Use Yes    Comment: 5 yearly     Social History     Substance and Sexual Activity   Drug Use No       Social History:    Social History     Socioeconomic History    Marital status: /Civil Union     Spouse name: Not on file    Number of children: Not on file    Years of education: Not on file    Highest education level: Not on file   Occupational History    Not on file   Tobacco Use    Smoking status: Never Smoker    Smokeless tobacco: Never Used   Vaping Use    Vaping Use: Never used Substance and Sexual Activity    Alcohol use: Yes     Comment: 5 yearly    Drug use: No    Sexual activity: Not on file   Other Topics Concern    Not on file   Social History Narrative    Not on file     Social Determinants of Health     Financial Resource Strain:     Difficulty of Paying Living Expenses:    Food Insecurity:     Worried About Running Out of Food in the Last Year:     [address] in the Last Year:    Transportation Needs:     Lack of Transportation (Medical):  Lack of Transportation (Non-Medical):    Physical Activity:     Days of Exercise per Week:     Minutes of Exercise per Session:    Stress:     Feeling of Stress :    Social Connections:     Frequency of Communication with Friends and Family:     Frequency of Social Gatherings with Friends and Family:     Attends Jain Services:     Active Member of Clubs or Organizations:     Attends Club or Organization Meetings:     Marital Status:    Intimate Partner Violence:     Fear of Current or Ex-Partner:     Emotionally Abused:     Physically Abused:     Sexually Abused:        Family Psychiatric History:     Family History   Problem Relation Age of Onset    Asthma Mother     No Known Problems Father     Rectal cancer Sister 62    No Known Problems Daughter     No Known Problems Maternal Grandmother     No Known Problems Maternal Grandfather     No Known Problems Paternal Grandmother     No Known Problems Paternal Grandfather     No Known Problems Paternal Aunt        History Review: Chart reviewed         OBJECTIVE:     Vital signs in last 24 hours: There were no vitals filed for this visit      Mental Status Evaluation:    Appearance age appropriate, casually dressed   Behavior cooperative, calm   Speech normal rate, normal volume, normal pitch   Mood normal   Affect normal range and intensity, appropriate   Thought Processes organized, goal directed   Associations intact associations   Thought Content no overt delusions   Perceptual Disturbances: no auditory hallucinations, no visual hallucinations   Abnormal Thoughts  Risk Potential Suicidal ideation - None  Homicidal ideation - None  Potential for aggression - No   Orientation oriented to person, place, time/date and situation   Memory recent and remote memory grossly intact   Consciousness alert and awake   Attention Span Concentration Span attention span and concentration are age appropriate   Intellect appears to be of average intelligence   Insight intact   Judgement intact   Muscle Strength and  Gait normal muscle strength and normal muscle tone, normal gait and normal balance   Motor activity no abnormal movements   Language no difficulty naming common objects, no difficulty repeating a phrase, no difficulty writing a sentence   Fund of Knowledge adequate knowledge of current events  adequate fund of knowledge regarding past history  adequate fund of knowledge regarding vocabulary    Pain none   Pain Scale 0       Laboratory Results: WNL    Recent Labs (last 2 months):   No visits with results within 2 Month(s) from this visit     Latest known visit with results is:   Lab on 03/04/2021   Component Date Value    Lithium Lvl 03/04/2021 0 5     WBC 03/04/2021 8 04     RBC 03/04/2021 5 16*    Hemoglobin 03/04/2021 13 6     Hematocrit 03/04/2021 43 4     MCV 03/04/2021 84     MCH 03/04/2021 26 4*    MCHC 03/04/2021 31 3*    RDW 03/04/2021 14 5     Platelets 96/26/4775 168     MPV 03/04/2021 11 7     Sodium 03/04/2021 141     Potassium 03/04/2021 4 1     Chloride 03/04/2021 110*    CO2 03/04/2021 26     ANION GAP 03/04/2021 5     BUN 03/04/2021 9     Creatinine 03/04/2021 1 02     Glucose, Fasting 03/04/2021 128*    Calcium 03/04/2021 9 6     AST 03/04/2021 32     ALT 03/04/2021 41     Alkaline Phosphatase 03/04/2021 78     Total Protein 03/04/2021 7 3     Albumin 03/04/2021 3 9     Total Bilirubin 03/04/2021 0 84     eGFR 03/04/2021 60     Hemoglobin A1C 03/04/2021 6 0*    EAG 03/04/2021 126     Cholesterol 03/04/2021 126     Triglycerides 03/04/2021 155*    HDL, Direct 03/04/2021 38*    LDL Calculated 03/04/2021 57     Non-HDL-Chol (CHOL-HDL) 03/04/2021 88     TSH 3RD GENERATON 03/04/2021 1 550        Suicide/Homicide Risk Assessment:    Risk of Harm to Self:  Based on today's assessment, Robson Blank presents the following risk of harm to self: none    Risk of Harm to Others:  Based on today's assessment, Robson Blank presents the following risk of harm to others: none    The following interventions are recommended: no intervention changes needed    Assessment/Plan:       Diagnoses and all orders for this visit:    Bipolar I disorder with depression ([address] )  -     gabapentin (NEURONTIN) 300 mg capsule; 1 in AM and 2 HS    Bipolar disorder with depression ([address] )  -     gabapentin (NEURONTIN) 300 mg capsule; 1 in AM and 2 HS    Major depressive disorder, recurrent, moderate (HCC)  -     venlafaxine (EFFEXOR-XR) 75 mg 24 hr capsule; Take 1 capsule (75 mg total) by mouth daily          Treatment Recommendations/Precautions:      Continue Wellbutrin tablet 75 mg daily   Continue Klonopin with the reduction of 1 tab per day   Continue gabapentin 300 mg t i d  Continue trazodone 100 mg HS  Continue Effexor XR 75 mg daily  Follow-up in 3 months  Medication management every 3 months  Aware of 24 hour and weekend coverage for urgent situations accessed by calling [facility] main practice number    Medications Risks/Benefits      Risks, Benefits And Possible Side Effects Of Medications:    Risks, benefits, and possible side effects of medications explained to Robson Burnslizy and she verbalizes understanding and agreement for treatment  Controlled Medication Discussion:     Not applicable    Psychotherapy Provided:     Individual psychotherapy provided: Yes  Counseling was provided during the session today for 15 minutes       Treatment Plan:    Completed and signed during the session: Yes - Treatment Plan done but not signed at time of office visit due to:  Plan reviewed in person and verbal consent given due to COVID social distancing    Note Share:        RUSSELL Salazar 06/15/21

## 2021-06-16 ENCOUNTER — OFFICE VISIT (OUTPATIENT)
Dept: PHYSICAL THERAPY | Facility: CLINIC | Age: 61
End: 2021-06-16
Payer: COMMERCIAL

## 2021-06-16 DIAGNOSIS — M17.11 PRIMARY OSTEOARTHRITIS OF RIGHT KNEE: Primary | ICD-10-CM

## 2021-06-16 DIAGNOSIS — M94.261 CHONDROMALACIA OF KNEE, RIGHT: ICD-10-CM

## 2021-06-16 PROCEDURE — 97110 THERAPEUTIC EXERCISES: CPT

## 2021-06-16 PROCEDURE — 97112 NEUROMUSCULAR REEDUCATION: CPT

## 2021-06-16 PROCEDURE — 97140 MANUAL THERAPY 1/> REGIONS: CPT

## 2021-06-16 NOTE — PROGRESS NOTES
Daily Note     Today's date: 2021  Patient name: Naveed Gomez  : 1960  MRN: [id number]  Referring provider: Charito Choudhary DO  Dx:   Encounter Diagnosis     ICD-10-CM    1  Primary osteoarthritis of right knee  M17 11    2  Chondromalacia of knee, right  M94 261                   Subjective: Patient reports slight improvement & her knee "doesn't hurt as much when she does her HEP "       Objective: See treatment diary below      Assessment: Tolerated treatment well  Patient would benefit from continued PT  Patient exhibited limited endurance on the elliptical will need to build tolerance  Plan: Continue per plan of care          Precautions L Inner thigh pain        Manuals             MFR to R ITB    13'          R PF mobs    2'                                     Neuro Re-Ed              Tandem stance    1 min ea         SLS                                                                                   Ther Ex             Elliptical    L1 6 min         Hamstring Stretch 30" hd 3x30" each  3x         ITB stretch 30" hd bilat 3x30" each  3x         Quad Set    20x         LAQ bilat    30x         TKE Right    20x Blue         Seated ISO hip ADD with PGB 10" hd    10x         Mini Squats    10x         BOSU Lunges Bilat                                                                     Ther Activity                                         Gait Training                                         Modalities

## 2021-06-18 ENCOUNTER — OFFICE VISIT (OUTPATIENT)
Dept: PHYSICAL THERAPY | Facility: CLINIC | Age: 61
End: 2021-06-18
Payer: COMMERCIAL

## 2021-06-18 DIAGNOSIS — M94.261 CHONDROMALACIA OF KNEE, RIGHT: ICD-10-CM

## 2021-06-18 DIAGNOSIS — M17.11 PRIMARY OSTEOARTHRITIS OF RIGHT KNEE: Primary | ICD-10-CM

## 2021-06-18 PROCEDURE — 97110 THERAPEUTIC EXERCISES: CPT

## 2021-06-18 PROCEDURE — 97112 NEUROMUSCULAR REEDUCATION: CPT

## 2021-06-18 PROCEDURE — 97140 MANUAL THERAPY 1/> REGIONS: CPT

## 2021-06-18 NOTE — PROGRESS NOTES
Daily Note     Today's date: 2021  Patient name: Bushra Joseph  : 1960  MRN: [id number]  Referring provider: Viral Jamison DO  Dx:   Encounter Diagnosis     ICD-10-CM    1  Primary osteoarthritis of right knee  M17 11    2  Chondromalacia of knee, right  M94 261                   Subjective: patient stated she still has pain in right and that the pain would wake her up at night reaching 8/10 on pain scale       Objective: See treatment diary below      Assessment: Patient tolerated treatment well  No exacerbation of sx with program  Patient able to complete all tasks assigned with good execution  Patient would benefit from continued therapy to decrease pain and increase strength and flexibility to improve LOF  Plan: Continue per plan of care  Progress treatment as tolerated         Precautions L Inner thigh pain        Manuals           MFR to R ITB    13'  13 min        R PF mobs    2'  2 min                                   Neuro Re-Ed              Tandem stance    1 min ea  1 min ea       SLS                                                                                   Ther Ex             Elliptical    L1 6 min  L1 6 min       Hamstring Stretch 30" hd 3x30" each  3x  3x       ITB stretch 30" hd bilat 3x30" each  3x  3x        Quad Set    20x  20x        LAQ bilat    30x  20x       TKE Right    20x Blue  20x blue        Seated ISO hip ADD with PGB 10" hd    10x  10x       Mini Squats    10x  2x10       BOSU Lunges Bilat                                                                     Ther Activity                                         Gait Training                                         Modalities

## 2021-06-27 DIAGNOSIS — R37 SEXUAL DYSFUNCTION: ICD-10-CM

## 2021-06-28 ENCOUNTER — OFFICE VISIT (OUTPATIENT)
Dept: PHYSICAL THERAPY | Facility: CLINIC | Age: 61
End: 2021-06-28
Payer: COMMERCIAL

## 2021-06-28 DIAGNOSIS — M17.11 PRIMARY OSTEOARTHRITIS OF RIGHT KNEE: Primary | ICD-10-CM

## 2021-06-28 DIAGNOSIS — M94.261 CHONDROMALACIA OF KNEE, RIGHT: ICD-10-CM

## 2021-06-28 PROCEDURE — 97112 NEUROMUSCULAR REEDUCATION: CPT

## 2021-06-28 PROCEDURE — 97110 THERAPEUTIC EXERCISES: CPT

## 2021-06-28 PROCEDURE — 97140 MANUAL THERAPY 1/> REGIONS: CPT

## 2021-06-28 NOTE — PROGRESS NOTES
Daily Note     Today's date: 2021  Patient name: Ofe Anne  : 1960  MRN: [id number]  Referring provider: Jean Howard DO  Dx:   Encounter Diagnosis     ICD-10-CM    1  Primary osteoarthritis of right knee  M17 11    2  Chondromalacia of knee, right  M94 261                   Subjective: patient state her knee has been killing her lately  She stated she wake up at night with pain or sometimes first think in the morning  Patient reported the the goes across the bottom of her patella to inside of the knee       Objective: See treatment diary below      Assessment: increased pain with adductor squeeze in left hip adductor  Patient tolerated treatment only fair due to increased pain  Patient would benefit from continued therapy to decrease pain and increase strength and flexibility to improve overall LOF      Plan: Continue per plan of care  Progress treatment as tolerated         Precautions L Inner thigh pain        Manuals         MFR to R ITB    13'  13 min  15 min       R PF mobs    2'  2 min                                   Neuro Re-Ed              Tandem stance    1 min ea  1 min ea  2x 30"      SLS                                                                                   Ther Ex             Elliptical    L1 6 min  L1 6 min  L1 7min     Hamstring Stretch 30" hd 3x30" each  3x  3x  3x     ITB stretch 30" hd bilat 3x30" each  3x  3x   3x      Quad Set    20x  20x   20x     LAQ bilat    30x  20x  20x     TKE Right    20x Blue  20x blue   held     Seated ISO hip ADD with PGB 10" hd    10x  10x  7x     Mini Squats    10x  2x10  20x      BOSU Lunges Bilat                                                                     Ther Activity                                         Gait Training                                         Modalities

## 2021-06-29 RX ORDER — BUPROPION HYDROCHLORIDE 75 MG/1
TABLET ORAL
Qty: 90 TABLET | Refills: 3 | Status: SHIPPED | OUTPATIENT
Start: 2021-06-29 | End: 2021-10-26 | Stop reason: DRUGHIGH

## 2021-07-01 ENCOUNTER — OFFICE VISIT (OUTPATIENT)
Dept: PHYSICAL THERAPY | Facility: CLINIC | Age: 61
End: 2021-07-01
Payer: COMMERCIAL

## 2021-07-01 DIAGNOSIS — M17.11 PRIMARY OSTEOARTHRITIS OF RIGHT KNEE: Primary | ICD-10-CM

## 2021-07-01 DIAGNOSIS — M94.261 CHONDROMALACIA OF KNEE, RIGHT: ICD-10-CM

## 2021-07-01 PROCEDURE — 97110 THERAPEUTIC EXERCISES: CPT

## 2021-07-01 PROCEDURE — 97140 MANUAL THERAPY 1/> REGIONS: CPT

## 2021-07-01 NOTE — PROGRESS NOTES
Daily Note     Today's date: 2021  Patient name: Ashely Will  : 1960  MRN: [id number]  Referring provider: Tammy Rowell DO  Dx:   Encounter Diagnosis     ICD-10-CM    1  Primary osteoarthritis of right knee  M17 11    2  Chondromalacia of knee, right  M94 261                   Subjective: Patient states she is doing "about the same"      Objective: See treatment diary below      Assessment: Tolerated treatment well  Patient exhibited good technique with therapeutic exercises      Plan: Continue per plan of care        Precautions L Inner thigh pain        Manuals       MFR to R ITB    13'  13 min  15 min   13 min    R PF mobs    2'  2 min    2 min                               Neuro Re-Ed              Tandem stance    1 min ea  1 min ea  2x 30"   1 min ea   SLS                                                                                   Ther Ex             Elliptical    L1 6 min  L1 6 min  L1 7min  L1 7 min   Hamstring Stretch 30" hd 3x30" each  3x  3x  3x  3x   ITB stretch 30" hd bilat 3x30" each  3x  3x   3x   3x   Quad Set    20x  20x   20x  20x    LAQ bilat    30x  20x  20x  20x 1#   TKE Right    20x Blue  20x blue   held 20x Blue   Seated ISO hip ADD with PGB 10" hd    10x  10x  7x  10x 10" hd   Mini Squats    10x  2x10  20x   20x   BOSU Lunges Bilat          10x ea                                                           Ther Activity                                         Gait Training                                         Modalities

## 2021-07-06 ENCOUNTER — OFFICE VISIT (OUTPATIENT)
Dept: PHYSICAL THERAPY | Facility: CLINIC | Age: 61
End: 2021-07-06
Payer: COMMERCIAL

## 2021-07-06 DIAGNOSIS — M94.261 CHONDROMALACIA OF KNEE, RIGHT: ICD-10-CM

## 2021-07-06 DIAGNOSIS — M17.11 PRIMARY OSTEOARTHRITIS OF RIGHT KNEE: Primary | ICD-10-CM

## 2021-07-06 PROCEDURE — 97110 THERAPEUTIC EXERCISES: CPT

## 2021-07-06 PROCEDURE — 97140 MANUAL THERAPY 1/> REGIONS: CPT

## 2021-07-06 PROCEDURE — 97112 NEUROMUSCULAR REEDUCATION: CPT

## 2021-07-06 NOTE — PROGRESS NOTES
Daily Note     Today's date: 2021  Patient name: Eric Bray  : 1960  MRN: [id number]  Referring provider: Lauren Cullen DO  Dx:   Encounter Diagnosis     ICD-10-CM    1  Primary osteoarthritis of right knee  M17 11    2  Chondromalacia of knee, right  M94 261                   Subjective: patient stated she accidentally rolled onto her right side causing increased pain along her right side  Objective: See treatment diary below      Assessment: Patient progressing well towards goals  Good execution of exercise with program  Patient demonstrated the appropriate amount of fatigue for program   Patient sensitive to palpation to along entire right IT band  Will continue to monitor pain levels and progress as able      Plan: Continue per plan of care  Progress treatment as tolerated         Precautions L Inner thigh pain        Manuals     MFR to R ITB  15 min   13 min  15 min   13 min    R PF mobs     2 min    2 min                             Neuro Re-Ed             Tandem stance  2x 30" hold   1 min ea  2x 30"   1 min ea   SLS         10x     BOSU lunges  10x           BOSU march   2 min  (difficult)                                                 Ther Ex            Elliptical  L1 7 min    L1 6 min  L1 7min  L1 7 min   Hamstring Stretch 30" hd 3x30" each   3x  3x  3x   ITB stretch 30" hd bilat 3x30" each   3x   3x   3x   Quad Set     20x   20x  20x    LAQ bilat  20x 1#   20x  20x  20x 1#   TKE Right  20x blue    20x blue   held 20x Blue   Seated ISO hip ADD with PGB 10" hd  10x 10"hd   10x  7x  10x 10" hd   Mini Squats  20x   2x10  20x   20x                                                                  Ther Activity                                      Gait Training                                      Modalities

## 2021-07-08 ENCOUNTER — EVALUATION (OUTPATIENT)
Dept: PHYSICAL THERAPY | Facility: CLINIC | Age: 61
End: 2021-07-08
Payer: COMMERCIAL

## 2021-07-08 DIAGNOSIS — M17.11 PRIMARY OSTEOARTHRITIS OF RIGHT KNEE: Primary | ICD-10-CM

## 2021-07-08 DIAGNOSIS — M94.261 CHONDROMALACIA OF KNEE, RIGHT: ICD-10-CM

## 2021-07-08 PROCEDURE — 97112 NEUROMUSCULAR REEDUCATION: CPT | Performed by: PHYSICAL THERAPIST

## 2021-07-08 PROCEDURE — 97140 MANUAL THERAPY 1/> REGIONS: CPT | Performed by: PHYSICAL THERAPIST

## 2021-07-08 PROCEDURE — 97110 THERAPEUTIC EXERCISES: CPT | Performed by: PHYSICAL THERAPIST

## 2021-07-08 NOTE — PROGRESS NOTES
PT Evaluation     Today's date: 6/10/2021  Patient name: Virginia Juarez  : 1960  MRN: [id number]  Referring provider: Bay Rodriguez DO  Dx:   Encounter Diagnosis     ICD-10-CM    1  Primary osteoarthritis of right knee  M17 11    2  Chondromalacia of knee, right  M94 261                   Assessment  Assessment details: Pt is a 57 yoF who presents with R knee pain  She reports pain while sleeping and sitting  She demonstrates strength and ROM deficits in B LE's  She presents with S/S consistent with Chondromalacia Patella  She would benefit from skilled Physical Therapy to improve the aforementioned deficits progressing to improved function  Impairments: activity intolerance, impaired physical strength, lacks appropriate home exercise program and pain with function    Goals  ST  Decrease pain by 25% - MET  2  Sit 30' - Progressing  3  Initiate HEP - MET    LT  Sit 2 hours - Progressing  2  Less than 3 sleep disturbances/week - Progressing  3  Independent with HEP - Progressing    Plan  Patient would benefit from: skilled physical therapy  Planned therapy interventions: joint mobilization, manual therapy, Rahman taping, neuromuscular re-education, patient education, strengthening, stretching, flexibility, home exercise program, therapeutic activities and therapeutic exercise  Frequency: 2x week  Treatment plan discussed with: patient        Subjective Evaluation    History of Present Illness  Mechanism of injury: Pt reports she tore the meniscus in her R knee in in May 2019 when she stood up and took a step    She reports she has been in pain since  Pain  Current pain ratin  At best pain ratin  At worst pain rating: 10  Aggravating factors: sitting    Treatments  Current treatment: physical therapy  Patient Goals  Patient goals for therapy: decreased edema, decreased pain, increased strength and return to sport/leisure activities          Objective     Active Range of Motion Left Hip   Flexion: 111 degrees   Extension: -13 degrees   External rotation (90/90): [facility]  Internal rotation (90/90): WFL    Right Hip   Flexion: 98 degrees   Extension: -12 degrees   External rotation (90/90): [facility]  Internal rotation (90/90): WFL    Left Knee   Flexion: WFL  Extension: -11 degrees     Right Knee   Flexion: WFL  Extension: -11 degrees     Left Ankle/Foot   Normal active range of motion    Right Ankle/Foot   Normal active range of motion    Patellar Static Positioning     Additional Patellar Static Positioning Details  Medial Rotation R>L    Strength/Myotome Testing     Left Hip   Planes of Motion   Flexion: 2+  Extension: 2-  Abduction: 2+  External rotation: 4  Internal rotation: 4    Right Hip   Planes of Motion   Flexion: 4  Extension: 2-  Abduction: 2+  External rotation: 4  Internal rotation: 4    Left Knee   Flexion: 4-  Extension: 2+    Right Knee   Flexion: 4-  Extension: 2+    Left Ankle/Foot   Dorsiflexion: 4+  Plantar flexion: 5  Inversion: 4  Eversion: 4    Right Ankle/Foot   Dorsiflexion: 4+  Plantar flexion: 5  Inversion: 4  Eversion: 4+    Tests     Left Knee   Negative anterior Lachman, posterior sag, valgus stress test at 0 degrees and varus stress test at 0 degrees  Right Knee   Negative anterior Lachman, posterior sag, valgus stress test at 0 degrees and varus stress test at 0 degrees       Additional Tests Details  Hamstring F;exibility R: -41, L: -38  Pavel: (+) Bilaterally      Flowsheet Rows      Most Recent Value   PT/OT G-Codes   Current Score  51   Projected Score  63                Precautions L Inner thigh pain        Manuals  7/6 7/8 6/28 7/1   MFR to R ITB  15 min  20'   15 min   13 min    R PF mobs    5'     2 min                             Neuro Re-Ed             Tandem stance  2x 30" hold  2x30" hold   2x 30"   1 min ea   SLS         10x     BOSU lunges  10x  10x each         BOSU march   2 min  (difficult)  2' with UE Assist                                             Ther Ex            Elliptical  L1 7 min   L1 6min   L1 7min  L1 7 min   Hamstring Stretch 30" hd 3x30" each     3x  3x   ITB stretch 30" hd bilat 3x30" each  3x   3x   3x   Quad Set       20x  20x    LAQ bilat  20x 1#     20x  20x 1#   TKE Right  20x blue   30x Blue   held 20x Blue   Seated ISO hip ADD with PGB 10" hd  10x 10"hd     7x  10x 10" hd   Mini Squats  20x  20x   20x   20x   Supine Iso Hip ADD 10" hold    10x                                                            Ther Activity                                      Gait Training                                      Modalities

## 2021-07-12 ENCOUNTER — OFFICE VISIT (OUTPATIENT)
Dept: PHYSICAL THERAPY | Facility: CLINIC | Age: 61
End: 2021-07-12
Payer: COMMERCIAL

## 2021-07-12 DIAGNOSIS — M17.11 PRIMARY OSTEOARTHRITIS OF RIGHT KNEE: Primary | ICD-10-CM

## 2021-07-12 DIAGNOSIS — M94.261 CHONDROMALACIA OF KNEE, RIGHT: ICD-10-CM

## 2021-07-12 PROCEDURE — 97110 THERAPEUTIC EXERCISES: CPT

## 2021-07-12 PROCEDURE — 97140 MANUAL THERAPY 1/> REGIONS: CPT

## 2021-07-12 NOTE — PROGRESS NOTES
Daily Note     Today's date: 2021  Patient name: Maggi Houser  : 1960  MRN: [id number]  Referring provider: Ishaan Eller DO  Dx:   Encounter Diagnosis     ICD-10-CM    1  Primary osteoarthritis of right knee  M17 11    2  Chondromalacia of knee, right  M94 261                   Subjective: "I think there is some improvement"      Objective: See treatment diary below      Assessment: Tolerated treatment well  Patient exhibited good technique with therapeutic exercises      Plan: Continue per plan of care        Precautions L Inner thigh pain        Manuals     MFR to R ITB  15 min 13 min  13 min  15 min   13 min    R PF mobs   2 min  2 min    2 min                             Neuro Re-Ed             Tandem stance 30" hd  2x 30" hold 2x  1 min ea  2x 30"   1 min ea   SLS         10x     BOSU lunges  10x 10x          BOSU march   2 min  (difficult) 2 min                                                Ther Ex            Elliptical  L1 7 min  L1 6 min  L1 6 min  L1 7min  L1 7 min   Hamstring Stretch 30" hd 3x30" each 3x  3x  3x  3x   ITB stretch 30" hd bilat 3x30" each 3x  3x   3x   3x   Quad Set     20x   20x  20x    LAQ bilat  20x 1# 20x 1#  20x  20x  20x 1#   TKE Right  20x blue  20x Blue  20x blue   held 20x Blue   Seated ISO hip ADD with PGB 10" hd  10x 10"hd 10x  10x  7x  10x 10" hd   Mini Squats  20x 20x  2x10  20x   20x                                                                  Ther Activity                                      Gait Training                                      Modalities

## 2021-07-15 ENCOUNTER — OFFICE VISIT (OUTPATIENT)
Dept: PHYSICAL THERAPY | Facility: CLINIC | Age: 61
End: 2021-07-15
Payer: COMMERCIAL

## 2021-07-15 DIAGNOSIS — M94.261 CHONDROMALACIA OF KNEE, RIGHT: ICD-10-CM

## 2021-07-15 DIAGNOSIS — M17.11 PRIMARY OSTEOARTHRITIS OF RIGHT KNEE: Primary | ICD-10-CM

## 2021-07-15 PROCEDURE — 97112 NEUROMUSCULAR REEDUCATION: CPT

## 2021-07-15 PROCEDURE — 97110 THERAPEUTIC EXERCISES: CPT

## 2021-07-15 PROCEDURE — 97140 MANUAL THERAPY 1/> REGIONS: CPT

## 2021-07-15 NOTE — PROGRESS NOTES
Daily Note     Today's date: 7/15/2021  Patient name: Héctor Arreola  : 1960  MRN: [id number]  Referring provider: Gregorio Santiago DO  Dx:   Encounter Diagnosis     ICD-10-CM    1  Primary osteoarthritis of right knee  M17 11    2  Chondromalacia of knee, right  M94 261                   Subjective: Pt reports pain with sleeping at night  Patient reports painful pes anserine  Objective: See treatment diary below      Assessment: Tolerated treatment well  Patient exhibited good technique with therapeutic exercises  Patient was advised to perform ice massage to pes anserine when it is painful  Continue to monitor response to treatment  Plan: Continue per plan of care        Precautions L Inner thigh pain        Manuals  7/6 7/12 7/15   7/1   MFR to R ITB  15 min 13 min & Pes 13 min   13 min    R PF mobs   2 min 2 min   2 min                         Neuro Re-Ed           Tandem stance 30" hd  2x 30" hold 2x 2x   1 min ea   SLS       10x     BOSU lunges  10x 10x 10x       BOSU march   2 min  (difficult) 2 min 2 min                                       Ther Ex          Elliptical  L1 7 min  L1 6 min L1 7"   L1 7 min   Hamstring Stretch 30" hd 3x30" each 3x 3x   3x   ITB stretch 30" hd bilat 3x30" each 3x 3x   3x   Quad Set       20x    LAQ bilat  20x 1# 20x 1# 20x 1#   20x 1#   TKE Right  20x blue  20x Blue 20x Blue  20x Blue   Seated ISO hip ADD with PGB 10" hd  10x 10"hd 10x 10x   10x 10" hd   Mini Squats  20x 20x 20x   20x                                                        Ther Activity                                Gait Training                                Modalities

## 2021-07-19 ENCOUNTER — OFFICE VISIT (OUTPATIENT)
Dept: PHYSICAL THERAPY | Facility: CLINIC | Age: 61
End: 2021-07-19
Payer: COMMERCIAL

## 2021-07-19 DIAGNOSIS — M94.261 CHONDROMALACIA OF KNEE, RIGHT: ICD-10-CM

## 2021-07-19 DIAGNOSIS — M17.11 PRIMARY OSTEOARTHRITIS OF RIGHT KNEE: Primary | ICD-10-CM

## 2021-07-19 PROCEDURE — 97140 MANUAL THERAPY 1/> REGIONS: CPT

## 2021-07-19 PROCEDURE — 97112 NEUROMUSCULAR REEDUCATION: CPT

## 2021-07-19 PROCEDURE — 97110 THERAPEUTIC EXERCISES: CPT

## 2021-07-19 NOTE — PROGRESS NOTES
Daily Note     Today's date: 2021  Patient name: Anatoliy Dudley  : 1960  MRN: [id number]  Referring provider: Jeanie Priest DO  Dx:   Encounter Diagnosis     ICD-10-CM    1  Primary osteoarthritis of right knee  M17 11    2  Chondromalacia of knee, right  M94 261                   Subjective: patient stated that she took the advise of her PTA last visit and started doing ice massages on her pes asinine  She stated it helped with pain tremendously and was able to sleep pain free for the first time in a while       Objective: See treatment diary below      Assessment: Patient tolerated treatment well  No exacerbation of sx with program  Patient able to complete all tasks assigned with good execution  Patient would benefit from continued therapy to decrease pain and increase strength and flexibility to improve LOF  Plan: Continue per plan of care  Progress treatment as tolerated         Precautions L Inner thigh pain        Manuals  7/6 7/12 7/15 7/19    MFR to R ITB  15 min 13 min & Pes 13 min 13 min     R PF mobs   2 min 2 min 2 min                         Neuro Re-Ed          Tandem stance 30" hd on foam    2x 30" hold 2x 2x 2x    SLS          BOSU lunges  10x 10x 10x 10x     BOSU march   2 min  (difficult) 2 min 2 min 2 min                                  Ther Ex         Elliptical  L1 7 min  L1 6 min L1 7" L1 7 min    Hamstring Stretch 30" hd 3x30" each 3x 3x 3x    ITB stretch 30" hd bilat 3x30" each 3x 3x 3x    Quad Set         LAQ bilat  20x 1# 20x 1# 20x 1# 20x 1#    TKE Right  20x blue  20x Blue 20x Blue 20x blue     Seated ISO hip ADD with PGB 10" hd  10x 10"hd 10x 10x 10x    Mini Squats  20x 20x 20x 20x                                                      Ther Activity                             Gait Training                             Modalities

## 2021-07-21 ENCOUNTER — OFFICE VISIT (OUTPATIENT)
Dept: OBGYN CLINIC | Facility: MEDICAL CENTER | Age: 61
End: 2021-07-21
Payer: COMMERCIAL

## 2021-07-21 VITALS
HEART RATE: 76 BPM | BODY MASS INDEX: 43.54 KG/M2 | WEIGHT: 255 LBS | HEIGHT: 64 IN | DIASTOLIC BLOOD PRESSURE: 78 MMHG | SYSTOLIC BLOOD PRESSURE: 118 MMHG

## 2021-07-21 DIAGNOSIS — S83.241D TEAR OF MEDIAL MENISCUS OF RIGHT KNEE, UNSPECIFIED TEAR TYPE, UNSPECIFIED WHETHER OLD OR CURRENT TEAR, SUBSEQUENT ENCOUNTER: ICD-10-CM

## 2021-07-21 DIAGNOSIS — M17.11 PRIMARY OSTEOARTHRITIS OF RIGHT KNEE: Primary | ICD-10-CM

## 2021-07-21 PROCEDURE — 3008F BODY MASS INDEX DOCD: CPT | Performed by: ORTHOPAEDIC SURGERY

## 2021-07-21 PROCEDURE — 1036F TOBACCO NON-USER: CPT | Performed by: ORTHOPAEDIC SURGERY

## 2021-07-21 PROCEDURE — 99213 OFFICE O/P EST LOW 20 MIN: CPT | Performed by: ORTHOPAEDIC SURGERY

## 2021-07-21 NOTE — PROGRESS NOTES
Assessment/Plan:  1  Primary osteoarthritis of right knee    2  Tear of medial meniscus of right knee, unspecified tear type, unspecified whether old or current tear, subsequent encounter      No orders of the defined types were placed in this encounter  · Patient is doing well  · Patient aware she can repeat right knee steroid injection if her pain returns  · Continue PT and home exercises  · Continue naproxen prn pain  Return if symptoms worsen or fail to improve  I answered all of the patient's questions during the visit and provided education of the patient's condition during the visit  The patient verbalized understanding of the information given and agrees with the plan  This note was dictated using GI Track software  It may contain errors including improperly dictated words  Please contact physician directly for any questions  Subjective   Chief Complaint:   Chief Complaint   Patient presents with    Right Knee - Follow-up       HPI  Adrienne Lozano is a 57 y o  female who presents for follow up for right knee pain, OA, and medial meniscus tear  Patient received right knee Durolane injection on 5/17/2021 and reports mild relief  Patient states she has been attending physical therapy and notes significant improvement in her knee pain  She feels her pain may have been muscular  She has been applying ice consistently and notes her pain has improved  No longer having overnight pain  She will take naproxen if needed  She does not feel she needs an injection today  Review of Systems  ROS:    See HPI for musculoskeletal review  All other systems reviewed are negative     History:  Past Medical History:   Diagnosis Date    Anxiety     Arthritis     Back pain     Lower back    Bipolar 1 disorder (HCC)     Depression     Diverticulosis     Hip dysplasia     Bilateral    Hyperlipidemia     Knee pain, right     Obesity     Psoriasis     Along hairline      Stress incontinence Past Surgical History:   Procedure Laterality Date    COLONOSCOPY      DILATION AND CURETTAGE OF UTERUS      One D&C and one D&E    ESOPHAGOGASTRODUODENOSCOPY      NASAL SEPTUM SURGERY      ND KNEE SCOPE,MED/LAT MENISECTOMY Right 9/27/2018    Procedure: ARTHROSCOPY KNEE; PARTIAL MEDIAL MENISECTOMY; CHONDROPLASTY;  Surgeon: Denis Kennedy DO;  Location: [facility];  Service: Orthopedics    TONSILECTOMY AND ADNOIDECTOMY      TONSILLECTOMY       Social History   Social History     Substance and Sexual Activity   Alcohol Use Yes    Comment: 5 yearly     Social History     Substance and Sexual Activity   Drug Use No     Social History     Tobacco Use   Smoking Status Never Smoker   Smokeless Tobacco Never Used     Family History:   Family History   Problem Relation Age of Onset    Asthma Mother     No Known Problems Father     Rectal cancer Sister 62    No Known Problems Daughter     No Known Problems Maternal Grandmother     No Known Problems Maternal Grandfather     No Known Problems Paternal Grandmother     No Known Problems Paternal Grandfather     No Known Problems Paternal Aunt        Current Outpatient Medications on File Prior to Visit   Medication Sig Dispense Refill    acetaminophen (TYLENOL) 500 mg tablet Take 500 mg by mouth every 6 (six) hours as needed for mild pain      aspirin (ECOTRIN) 325 mg EC tablet Take 1 tablet (325 mg total) by mouth 2 (two) times a day (Patient not taking: Reported on 2/21/2021) 28 tablet 0    buPROPion (WELLBUTRIN) 75 mg tablet TAKE 1 TABLET BY MOUTH  DAILY 90 tablet 3    clonazePAM (KlonoPIN) 0 5 mg tablet TAKE 1 TABLET BY MOUTH 3 TO 4 TIMES DAILY 180 tablet 1    gabapentin (NEURONTIN) 300 mg capsule 1 in AM and 2 HS 90 capsule 2    Multiple Vitamin (MULTIVITAMIN) capsule Take 1 capsule by mouth daily      oxybutynin (DITROPAN-XL) 10 MG 24 hr tablet Take 10 mg by mouth daily at bedtime      traZODone (DESYREL) 100 mg tablet TAKE 1 TO 2 TABLETS BY MOUTH AT BEDTIME 180 tablet 3    venlafaxine (EFFEXOR-XR) 75 mg 24 hr capsule Take 1 capsule (75 mg total) by mouth daily 90 capsule 3     No current facility-administered medications on file prior to visit       Allergies   Allergen Reactions    Banana - Food Allergy Hives     Itching and swelling     Crab (Diagnostic) - Food Allergy Hives    Cleocin [Clindamycin]      Causes C Diff    Latex      Pt told not to be in contact with latex because of banana allergy    Penicillins Other (See Comments)     Causes vaginal itching and must take Diflucan with it        Objective     /78   Pulse 76   Ht 5' 4" (1 626 m)   Wt 116 kg (255 lb)   BMI 43 77 kg/m²      PE:  AAOx 3  WDWN  Hearing intact, no drainage from eyes  no audible wheezing  no abdominal distension  LE compartments soft, skin intact    Ortho Exam:  right Knee:   No erythema  no swelling  no effusion  no warmth  Rash over the medial knee  AROM: 0- 115  Stable to varus/valgus stress

## 2021-07-22 ENCOUNTER — OFFICE VISIT (OUTPATIENT)
Dept: PHYSICAL THERAPY | Facility: CLINIC | Age: 61
End: 2021-07-22
Payer: COMMERCIAL

## 2021-07-22 DIAGNOSIS — M94.261 CHONDROMALACIA OF KNEE, RIGHT: ICD-10-CM

## 2021-07-22 DIAGNOSIS — M17.11 PRIMARY OSTEOARTHRITIS OF RIGHT KNEE: Primary | ICD-10-CM

## 2021-07-22 PROCEDURE — 97112 NEUROMUSCULAR REEDUCATION: CPT | Performed by: PHYSICAL THERAPIST

## 2021-07-22 PROCEDURE — 97140 MANUAL THERAPY 1/> REGIONS: CPT | Performed by: PHYSICAL THERAPIST

## 2021-07-22 NOTE — PROGRESS NOTES
Daily Note     Today's date: 2021  Patient name: Maggi Houser  : 1960  MRN: [id number]  Referring provider: Ishaan Eller DO  Dx:   Encounter Diagnosis     ICD-10-CM    1  Primary osteoarthritis of right knee  M17 11    2  Chondromalacia of knee, right  M94 261                   Subjective: Pt reports she feels like her knee is moving better      Objective: See treatment diary below      Assessment: Tolerated treatment well  Patient would benefit from continued PT      Plan: Progress treatment as tolerated         Precautions L Inner thigh pain        Manuals  7/6 7/12 7/15 7/19 7/22   MFR to R ITB  15 min 13 min & Pes 13 min 13 min 13'    R PF mobs   2 min 2 min 2 min  2'                       Neuro Re-Ed          Tandem stance 30" hd on foam    2x 30" hold 2x 2x 2x 2x   SLS          BOSU lunges  10x 10x 10x 10x 10x    BOSU march   2 min  (difficult) 2 min 2 min 2 min 2min                                 Ther Ex         Elliptical  L1 7 min  L1 6 min L1 7" L1 7 min L1 6min   Hamstring Stretch 30" hd 3x30" each 3x 3x 3x 3x   ITB stretch 30" hd bilat 3x30" each 3x 3x 3x 3x   Quad Set         LAQ bilat  20x 1# 20x 1# 20x 1# 20x 1# 30x 2#   TKE Right  20x blue  20x Blue 20x Blue 20x blue  20x Blue   Seated ISO hip ADD with PGB 10" hd  10x 10"hd 10x 10x 10x 10x   Mini Squats  20x 20x 20x 20x  20x                                                    Ther Activity                             Gait Training                             Modalities

## 2021-07-26 ENCOUNTER — OFFICE VISIT (OUTPATIENT)
Dept: PHYSICAL THERAPY | Facility: CLINIC | Age: 61
End: 2021-07-26
Payer: COMMERCIAL

## 2021-07-26 DIAGNOSIS — M17.11 PRIMARY OSTEOARTHRITIS OF RIGHT KNEE: Primary | ICD-10-CM

## 2021-07-26 DIAGNOSIS — M94.261 CHONDROMALACIA OF KNEE, RIGHT: ICD-10-CM

## 2021-07-26 PROCEDURE — 97112 NEUROMUSCULAR REEDUCATION: CPT

## 2021-07-26 PROCEDURE — 97110 THERAPEUTIC EXERCISES: CPT

## 2021-07-26 PROCEDURE — 97140 MANUAL THERAPY 1/> REGIONS: CPT

## 2021-07-26 NOTE — PROGRESS NOTES
Daily Note     Today's date: 2021  Patient name: Santo Lloyd  : 1960  MRN: [id number]  Referring provider: Eva Haque DO  Dx:   Encounter Diagnosis     ICD-10-CM    1  Primary osteoarthritis of right knee  M17 11    2  Chondromalacia of knee, right  M94 261                   Subjective: patient stated her knee hurt Friday and Saturday night when she was in bed but otherwise felt well  Objective: See treatment diary below      Assessment: Patient able to progress with repetitions and resistance with various exercises  Good tolerance with progression with minimal cues required  Patient continues to present with deficits with strength and ROM requiring continued skilled physical therapy      Plan: Continue per plan of care  Progress treatment as tolerated         Precautions L Inner thigh pain        Manuals  7/26  7/15 7/19 7/22   MFR to R ITB  15 min  & Pes 13 min 13 min 13'    R PF mobs    2 min 2 min  2'                       Neuro Re-Ed          Tandem stance 30" hd on foam    3x 30" hold  2x 2x 2x   SLS  2x 15" hold         BOSU lunges  10x  10x 10x 10x    BOSU march   2 min    2 min 2 min 2min    Ball squats against wall with ISO hip adduction with playground ball   20x                           Ther Ex         Elliptical  L1 7 min   L1 7" L1 7 min L1 6min   Hamstring Stretch 30" hd 3x30" each  3x 3x 3x   ITB stretch 30" hd bilat 3x30" each  3x 3x 3x   Quad Set         LAQ bilat  20x 2#  20x 1# 20x 1# 30x 2#   TKE Right  20x blue   20x Blue 20x blue  20x Blue   Seated ISO hip ADD with PGB 10" hd  10x 10"hd  10x 10x 10x   Mini Squats    20x 20x  20x                                                    Ther Activity                             Gait Training                             Modalities

## 2021-07-29 ENCOUNTER — OFFICE VISIT (OUTPATIENT)
Dept: PHYSICAL THERAPY | Facility: CLINIC | Age: 61
End: 2021-07-29
Payer: COMMERCIAL

## 2021-07-29 DIAGNOSIS — M17.11 PRIMARY OSTEOARTHRITIS OF RIGHT KNEE: Primary | ICD-10-CM

## 2021-07-29 DIAGNOSIS — M94.261 CHONDROMALACIA OF KNEE, RIGHT: ICD-10-CM

## 2021-07-29 PROCEDURE — 97112 NEUROMUSCULAR REEDUCATION: CPT

## 2021-07-29 PROCEDURE — 97140 MANUAL THERAPY 1/> REGIONS: CPT

## 2021-07-29 PROCEDURE — 97110 THERAPEUTIC EXERCISES: CPT

## 2021-07-29 NOTE — PROGRESS NOTES
Daily Note     Today's date: 2021  Patient name: Jimmie Hodgkins  : 1960  MRN: [id number]  Referring provider: Angélica George DO  Dx:   Encounter Diagnosis     ICD-10-CM    1  Primary osteoarthritis of right knee  M17 11    2  Chondromalacia of knee, right  M94 261                   Subjective: nothing new to report       Objective: See treatment diary below      Assessment: Patient progressing well towards goals  Good execution of exercise with program  Patient demonstrated the appropriate amount of fatigue for program   Will continue to monitor pain levels and progress as able      Plan: Continue per plan of care  Progress treatment as tolerated         Precautions L Inner thigh pain        Manuals     MFR to R ITB  15 min 15 min  13 min 13'    R PF mobs     2 min  2'                       Neuro Re-Ed          Tandem stance 30" hd on foam    3x 30" hold 3x 30" hold   2x 2x   SLS  2x 15" hold  3x 15" hold       BOSU lunges  10x 10x  10x 10x    BOSU march   2 min   2 min   2 min 2min    Ball squats against wall with ISO hip adduction with playground ball   20x 20x                          Ther Ex         Elliptical  L1 7 min  L1 6 min  L1 7 min L1 6min   Hamstring Stretch 30" hd 3x30" each 3x   3x 3x   ITB stretch 30" hd bilat 3x30" each 3x   3x 3x   Quad Set         LAQ bilat  20x 2# 20x 2#  20x 1# 30x 2#   TKE Right  20x blue  20x blue   20x blue  20x Blue   Seated ISO hip ADD with PGB 10" hd  10x 10"hd Missed   10x 10x   Mini Squats     20x  20x                                                    Ther Activity                             Gait Training                             Modalities

## 2021-08-02 ENCOUNTER — OFFICE VISIT (OUTPATIENT)
Dept: PHYSICAL THERAPY | Facility: CLINIC | Age: 61
End: 2021-08-02
Payer: COMMERCIAL

## 2021-08-02 DIAGNOSIS — M94.261 CHONDROMALACIA OF KNEE, RIGHT: ICD-10-CM

## 2021-08-02 DIAGNOSIS — M17.11 PRIMARY OSTEOARTHRITIS OF RIGHT KNEE: Primary | ICD-10-CM

## 2021-08-02 PROCEDURE — 97140 MANUAL THERAPY 1/> REGIONS: CPT | Performed by: PHYSICAL THERAPIST

## 2021-08-02 PROCEDURE — 97112 NEUROMUSCULAR REEDUCATION: CPT | Performed by: PHYSICAL THERAPIST

## 2021-08-02 PROCEDURE — 97110 THERAPEUTIC EXERCISES: CPT | Performed by: PHYSICAL THERAPIST

## 2021-08-02 NOTE — PROGRESS NOTES
Daily Note     Today's date: 2021  Patient name: Virginia Juarez  : 1960  MRN: [id number]  Referring provider: Bay Rodriguez DO  Dx:   Encounter Diagnosis     ICD-10-CM    1  Primary osteoarthritis of right knee  M17 11    2  Chondromalacia of knee, right  M94 261                   Subjective: Pt reports LBP when washing dishes      Objective: See treatment diary below      Assessment: Tolerated treatment fair  Patient would benefit from continued PT      Plan: Progress treatment as tolerated         Precautions L Inner thigh pain        Manuals     MFR to R ITB  15 min 15 min 15'  13'    R PF mobs      2'                       Neuro Re-Ed          Tandem stance 30" hd on foam    3x 30" hold 3x 30" hold  3x  2x   SLS  2x 15" hold  3x 15" hold 3x 30"      BOSU lunges  10x 10x 10x  10x    BOSU march   2 min   2 min  2 min  2min    Ball squats against wall with ISO hip adduction with playground ball   20x 20x 20x                         Ther Ex         Elliptical  L1 7 min  L1 6 min L2 6 min  L1 6min   Hamstring Stretch 30" hd 3x30" each 3x  3x  3x   ITB stretch 30" hd bilat 3x30" each 3x  3x  3x   Quad Set         LAQ bilat  20x 2# 20x 2# 30x  30x 2#   TKE Right  20x blue  20x blue  30x Blue  20x Blue   Seated ISO hip ADD with PGB 10" hd  10x 10"hd Missed  10x  10x   Mini Squats      20x                                                    Ther Activity                             Gait Training                             Modalities

## 2021-08-05 ENCOUNTER — EVALUATION (OUTPATIENT)
Dept: PHYSICAL THERAPY | Facility: CLINIC | Age: 61
End: 2021-08-05
Payer: COMMERCIAL

## 2021-08-05 DIAGNOSIS — M17.11 PRIMARY OSTEOARTHRITIS OF RIGHT KNEE: Primary | ICD-10-CM

## 2021-08-05 DIAGNOSIS — M94.261 CHONDROMALACIA OF KNEE, RIGHT: ICD-10-CM

## 2021-08-05 PROCEDURE — 97140 MANUAL THERAPY 1/> REGIONS: CPT | Performed by: PHYSICAL THERAPIST

## 2021-08-05 PROCEDURE — 97112 NEUROMUSCULAR REEDUCATION: CPT | Performed by: PHYSICAL THERAPIST

## 2021-08-05 PROCEDURE — 97110 THERAPEUTIC EXERCISES: CPT | Performed by: PHYSICAL THERAPIST

## 2021-08-05 NOTE — PROGRESS NOTES
PT Re-Evaluation      Today's date: 2021  Patient name: Dayanara Ambrose  : 1960  MRN: [id number]  Referring provider: Nae Prasad DO  Dx:   Encounter Diagnosis     ICD-10-CM    1  Primary osteoarthritis of right knee  M17 11    2  Chondromalacia of knee, right  M94 261                   Assessment  Assessment details: Pt is a 57 yoF who presents with R knee pain  She reports improved pain while sleeping and sitting  She demonstrates strength and ROM deficits in B LE's  She presents with S/S consistent with Chondromalacia Patella  She would benefit from skilled Physical Therapy to improve the aforementioned deficits progressing to improved function  Impairments: activity intolerance, impaired physical strength, lacks appropriate home exercise program and pain with function    Goals  ST  Decrease pain by 25% - MET  2  Sit 30' - MET  3  Initiate HEP - MET    LT  Sit 2 hours - Progressing  2  Less than 3 sleep disturbances/week - MET  3  Independent with HEP - Progressing    Plan  Patient would benefit from: skilled physical therapy  Planned therapy interventions: joint mobilization, manual therapy, Rahman taping, neuromuscular re-education, patient education, strengthening, stretching, flexibility, home exercise program, therapeutic activities and therapeutic exercise  Frequency: 2x week  Treatment plan discussed with: patient        Subjective Evaluation    History of Present Illness  Mechanism of injury: Pt reports she tore the meniscus in her R knee in in May 2019 when she stood up and took a step    She reports she has been in pain since  Pain  Current pain ratin  At best pain ratin  At worst pain rating: 10  Aggravating factors: sitting    Treatments  Current treatment: physical therapy  Patient Goals  Patient goals for therapy: decreased edema, decreased pain, increased strength and return to sport/leisure activities          Objective     Active Range of Motion   Left Hip   Flexion: 111 degrees   Extension: -8 degrees   External rotation (90/90): [facility] [place]  Internal rotation (90/90): WFL    Right Hip   Flexion: 98 degrees   Extension: -9 degrees   External rotation (90/90): [facility] [place]  Internal rotation (90/90): WFL    Left Knee   Flexion: WFL  Extension: -9 degrees     Right Knee   Flexion: WFL  Extension: -10 degrees     Left Ankle/Foot   Normal active range of motion    Right Ankle/Foot   Normal active range of motion    Patellar Static Positioning     Additional Patellar Static Positioning Details  Medial Rotation R>L    Strength/Myotome Testing     Left Hip   Planes of Motion   Flexion: 4-  Extension: 2-  Abduction: 2+  External rotation: 4  Internal rotation: 4    Right Hip   Planes of Motion   Flexion: 4  Extension: 2-  Abduction: 2+  External rotation: 4  Internal rotation: 4    Left Knee   Flexion: 4-  Extension: 2+    Right Knee   Flexion: 4-  Extension: 2+    Left Ankle/Foot   Dorsiflexion: 4+  Plantar flexion: 5  Inversion: 4  Eversion: 4    Right Ankle/Foot   Dorsiflexion: 4+  Plantar flexion: 5  Inversion: 4  Eversion: 4+    Tests     Left Knee   Negative anterior Lachman, posterior sag, valgus stress test at 0 degrees and varus stress test at 0 degrees  Right Knee   Negative anterior Lachman, posterior sag, valgus stress test at 0 degrees and varus stress test at 0 degrees       Additional Tests Details  Hamstring Flexibility R: -21, L: -38  Liberty: (+) Bilaterally      Flowsheet Rows      Most Recent Value   PT/OT G-Codes   Current Score  72   Projected Score  63              Precautions L Inner thigh pain        Manuals  7/26 7/29 8/2 8/5    MFR to R ITB  15 min 15 min 15'  15'     R PF mobs                                      Neuro Re-Ed             Tandem stance 30" hd on foam     3x 30" hold 3x 30" hold  3x  3x    SLS  2x 15" hold  3x 15" hold 3x 30"  3x30"     BOSU lunges  10x 10x 10x       BOSU march   2 min    2 min  2 min       Ball squats against wall with ISO hip adduction with playground ball   20x 20x 20x                                Ther Ex            Elliptical  L1 7 min  L1 6 min L2 6 min  L2 6 min    Hamstring Stretch 30" hd 3x30" each 3x  3x      ITB stretch 30" hd bilat 3x30" each 3x  3x      Quad Set            LAQ bilat  20x 2# 20x 2# 30x  30x    TKE Right  20x blue  20x blue  30x Blue  20x Blue    Seated ISO hip ADD with PGB 10" hd  10x 10"hd Missed  10x      Mini Squats                                                                             Ther Activity                                      Gait Training                                       Modalities

## 2021-08-09 ENCOUNTER — OFFICE VISIT (OUTPATIENT)
Dept: PHYSICAL THERAPY | Facility: CLINIC | Age: 61
End: 2021-08-09
Payer: COMMERCIAL

## 2021-08-09 DIAGNOSIS — M17.11 PRIMARY OSTEOARTHRITIS OF RIGHT KNEE: Primary | ICD-10-CM

## 2021-08-09 DIAGNOSIS — M94.261 CHONDROMALACIA OF KNEE, RIGHT: ICD-10-CM

## 2021-08-09 PROCEDURE — 97110 THERAPEUTIC EXERCISES: CPT

## 2021-08-09 PROCEDURE — 97140 MANUAL THERAPY 1/> REGIONS: CPT

## 2021-08-09 NOTE — PROGRESS NOTES
Daily Note     Today's date: 2021  Patient name: Doug Fowler  : 1960  MRN: [id number]  Referring provider: Lianne Grant DO  Dx:   Encounter Diagnosis     ICD-10-CM    1  Primary osteoarthritis of right knee  M17 11    2  Chondromalacia of knee, right  M94 261                   Subjective:Patient reports decreased pain with ice massage  Objective: See treatment diary below      Assessment: Tolerated treatment well  Patient exhibited good technique with therapeutic exercises      Plan: Continue per plan of care        Precautions L Inner thigh pain        Manuals      MFR to R ITB  15 min 15 min 15' 15'     R PF mobs                             Neuro Re-Ed          Tandem stance 30" hd on foam    3x 30" hold 3x 30" hold  3x 3x    SLS 30" hd  2x 15" hold  3x 15" hold 3x 30" 3x     BOSU lunges  10x 10x 10x      BOSU march   2 min   2 min  2 min 2 min     Ball squats against wall with ISO hip adduction with playground ball   20x 20x 20x 20x                        Ther Ex         Elliptical  L1 7 min  L1 6 min L2 6 min L2 6 min    Hamstring Stretch 30" hd 3x30" each 3x  3x 3x    ITB stretch 30" hd bilat 3x30" each 3x  3x 3x    Quad Set         LAQ bilat  20x 2# 20x 2# 30x 30x 2#    TKE Right  20x blue  20x blue  30x Blue 30x Blue    Seated ISO hip ADD with PGB 10" hd  10x 10"hd Missed  10x 10x    Mini Squats                                                          Ther Activity                             Gait Training                             Modalities

## 2021-08-12 ENCOUNTER — OFFICE VISIT (OUTPATIENT)
Dept: PHYSICAL THERAPY | Facility: CLINIC | Age: 61
End: 2021-08-12
Payer: COMMERCIAL

## 2021-08-12 DIAGNOSIS — M94.261 CHONDROMALACIA OF KNEE, RIGHT: ICD-10-CM

## 2021-08-12 DIAGNOSIS — M17.11 PRIMARY OSTEOARTHRITIS OF RIGHT KNEE: Primary | ICD-10-CM

## 2021-08-12 PROCEDURE — 97110 THERAPEUTIC EXERCISES: CPT | Performed by: PHYSICAL THERAPIST

## 2021-08-12 PROCEDURE — 97112 NEUROMUSCULAR REEDUCATION: CPT | Performed by: PHYSICAL THERAPIST

## 2021-08-12 PROCEDURE — 97140 MANUAL THERAPY 1/> REGIONS: CPT | Performed by: PHYSICAL THERAPIST

## 2021-08-12 NOTE — PROGRESS NOTES
Daily Note     Today's date: 2021  Patient name: Tyree Piña  : 1960  MRN: [id number]  Referring provider: Rocael Turner DO  Dx:   Encounter Diagnosis     ICD-10-CM    1  Primary osteoarthritis of right knee  M17 11    2  Chondromalacia of knee, right  M94 261                   Subjective: Pt with no new complaints      Objective: See treatment diary below      Assessment: Tolerated treatment well  Patient would benefit from continued PT      Plan: Progress treatment as tolerated         Precautions L Inner thigh pain        Manuals     MFR to R ITB  15 min 15 min 15' 15' 12'    R PF mobs      3'                       Neuro Re-Ed          Tandem stance 30" hd on foam    3x 30" hold 3x 30" hold  3x 3x 3x   SLS 30" hd on foam  2x 15" hold  3x 15" hold 3x 30" 3x 3x    BOSU lunges  10x 10x 10x  10x    BOSU march   2 min   2 min  2 min 2 min 2 min    Ball squats against wall with ISO hip adduction with playground ball   20x 20x 20x 20x 20x                       Ther Ex         Elliptical  L1 7 min  L1 6 min L2 6 min L2 6 min L2 7 min   Hamstring Stretch 30" hd 3x30" each 3x  3x 3x 3x   ITB stretch 30" hd bilat 3x30" each 3x  3x 3x 3x   Quad Set         LAQ bilat  20x 2# 20x 2# 30x 30x 2# 30X 3#   TKE Right  20x blue  20x blue  30x Blue 30x Blue 30x Blue   Seated ISO hip ADD with PGB 10" hd  10x 10"hd Missed  10x 10x 10x   Mini Squats                                                          Ther Activity                             Gait Training                             Modalities                       Incoming refill request for: Tramadol  Per PDMP last dispensed on: 7/7/21  Last seen by: Meri Dillard NP on:4/9/21  Future appointment to see PCP on:   Future Appointments   Date Time Provider Department Center   9/1/2021  1:00 PM [facility]     Active medication agreement updated on: 12/9/2019  Last Drug Screen Collected on: 4/9/21    Script pended, with a start date of: 8/7/21    PDMP review:    Criteria met. Forwarded to Physician/SAMIR for signature.

## 2021-08-16 ENCOUNTER — OFFICE VISIT (OUTPATIENT)
Dept: PHYSICAL THERAPY | Facility: CLINIC | Age: 61
End: 2021-08-16
Payer: COMMERCIAL

## 2021-08-16 DIAGNOSIS — M94.261 CHONDROMALACIA OF KNEE, RIGHT: ICD-10-CM

## 2021-08-16 DIAGNOSIS — M17.11 PRIMARY OSTEOARTHRITIS OF RIGHT KNEE: Primary | ICD-10-CM

## 2021-08-16 PROCEDURE — 97140 MANUAL THERAPY 1/> REGIONS: CPT | Performed by: PHYSICAL THERAPIST

## 2021-08-16 PROCEDURE — 97112 NEUROMUSCULAR REEDUCATION: CPT | Performed by: PHYSICAL THERAPIST

## 2021-08-16 PROCEDURE — 97110 THERAPEUTIC EXERCISES: CPT | Performed by: PHYSICAL THERAPIST

## 2021-08-16 NOTE — PROGRESS NOTES
Daily Note     Today's date: 2021  Patient name: Doug Fowler  : 1960  MRN: [id number]  Referring provider: Lianne Grant DO  Dx:   Encounter Diagnosis     ICD-10-CM    1  Primary osteoarthritis of right knee  M17 11    2  Chondromalacia of knee, right  M94 261                   Subjective: Pt reports she almost quit coming to therapy because she is retired and wants free time  Objective: See treatment diary below      Assessment: Tolerated treatment well  Patient would benefit from continued PT      Plan: Progress treatment as tolerated         Precautions L Inner thigh pain        Manuals     MFR to R ITB  15 min 15 min 15' 15' 12'    R PF mobs      3'                       Neuro Re-Ed         Tandem stance 30" hd on foam   3x 3x 30" hold  3x 3x 3x   SLS 30" hd on foam 3x 3x 15" hold 3x 30" 3x 3x    BOSU lunges 10x 10x 10x  10x    BOSU march  2' 2 min  2 min 2 min 2 min    Ball squats against wall with ISO hip adduction with playground ball  20x 20x 20x 20x 20x                     Ther Ex        Elliptical L2 6 min L1 6 min L2 6 min L2 6 min L2 7 min   Hamstring Stretch 30" hd 3x 3x  3x 3x 3x   ITB stretch 30" hd bilat 3x 3x  3x 3x 3x   Quad Set        LAQ bilat 30x 3# 20x 2# 30x 30x 2# 30X 3#   TKE Right  20x blue  30x Blue 30x Blue 30x Blue   Seated ISO hip ADD with PGB 10" hd 10x Missed  10x 10x 10x   Mini Squats                                                    Ther Activity                          Gait Training                          Modalities

## 2021-08-19 ENCOUNTER — OFFICE VISIT (OUTPATIENT)
Dept: PHYSICAL THERAPY | Facility: CLINIC | Age: 61
End: 2021-08-19
Payer: COMMERCIAL

## 2021-08-19 DIAGNOSIS — M17.11 PRIMARY OSTEOARTHRITIS OF RIGHT KNEE: Primary | ICD-10-CM

## 2021-08-19 DIAGNOSIS — M94.261 CHONDROMALACIA OF KNEE, RIGHT: ICD-10-CM

## 2021-08-19 PROCEDURE — 97110 THERAPEUTIC EXERCISES: CPT

## 2021-08-19 PROCEDURE — 97112 NEUROMUSCULAR REEDUCATION: CPT

## 2021-08-19 PROCEDURE — 97140 MANUAL THERAPY 1/> REGIONS: CPT

## 2021-08-19 NOTE — PROGRESS NOTES
Daily Note     Today's date: 2021  Patient name: Reno Sanches  : 1960  MRN: [id number]  Referring provider: Malinda Arvizu DO  Dx:   Encounter Diagnosis     ICD-10-CM    1  Primary osteoarthritis of right knee  M17 11    2  Chondromalacia of knee, right  M94 261                   Subjective: patient c/o pain on the inside of her groin on left side  Objective: See treatment diary below      Assessment: Patient able to progress with repetitions and resistance with various exercises  Good tolerance with progression with minimal cues required  Patient continues to present with deficits with strength and ROM requiring continued skilled physical therapy      Plan: Continue per plan of care  Progress treatment as tolerated         Precautions L Inner thigh pain        Manuals     MFR to R ITB  15 min 15 min 15' 15' 12'    R PF mobs      3'                       Neuro Re-Ed         Tandem stance 30" hd on foam   3x 3x 30" hold  3x 3x 3x   SLS 30" hd on foam 3x 3x 15" hold 3x 30" 3x 3x    BOSU lunges 10x 10x 10x  10x    BOSU march  2' 3 min  2 min 2 min 2 min    Ball squats against wall with ISO hip adduction with playground ball  20x 20x 20x 20x 20x                     Ther Ex        Elliptical L2 6 min L3 6 min L2 6 min L2 6 min L2 7 min   Hamstring Stretch 30" hd 3x 3x  3x 3x 3x   ITB stretch 30" hd bilat 3x 3x  3x 3x 3x   Quad Set        LAQ bilat 30x 3# 20x 2# 30x 30x 2# 30X 3#   TKE Right  20x blue  30x Blue 30x Blue 30x Blue   Seated ISO hip ADD with PGB 10" hd 10x  10x 10x 10x                                                       Ther Activity                          Gait Training                          Modalities

## 2021-08-23 ENCOUNTER — OFFICE VISIT (OUTPATIENT)
Dept: PHYSICAL THERAPY | Facility: CLINIC | Age: 61
End: 2021-08-23
Payer: COMMERCIAL

## 2021-08-23 DIAGNOSIS — M17.11 PRIMARY OSTEOARTHRITIS OF RIGHT KNEE: Primary | ICD-10-CM

## 2021-08-23 DIAGNOSIS — M94.261 CHONDROMALACIA OF KNEE, RIGHT: ICD-10-CM

## 2021-08-23 PROCEDURE — 97110 THERAPEUTIC EXERCISES: CPT | Performed by: PHYSICAL THERAPIST

## 2021-08-23 PROCEDURE — 97140 MANUAL THERAPY 1/> REGIONS: CPT | Performed by: PHYSICAL THERAPIST

## 2021-08-23 PROCEDURE — 97112 NEUROMUSCULAR REEDUCATION: CPT | Performed by: PHYSICAL THERAPIST

## 2021-08-23 NOTE — PROGRESS NOTES
Daily Note     Today's date: 2021  Patient name: Garnell Apgar  : 1960  MRN: [id number]  Referring provider: Martín Ruggiero DO  Dx:   Encounter Diagnosis     ICD-10-CM    1  Primary osteoarthritis of right knee  M17 11    2  Chondromalacia of knee, right  M94 261                   Subjective: Pt reports her knee pain has improved  Her C/C now is R hip pain      Objective: See treatment diary below      Assessment: Tolerated treatment well  Patient would benefit from continued PT      Plan: Progress treatment as tolerated         Precautions L Inner thigh pain        Manuals     MFR to R ITB  15 min 15 min 15 min  12'    R PF mobs      3'                       Neuro Re-Ed         Tandem stance 30" hd on foam   3x 3x 30" hold  3x  3x   SLS 30" hd on foam 3x 3x 15" hold 3x   3x    BOSU lunges 10x 10x 10x  10x    BOSU march  2 3 min  3 min  2 min    Ball squats against wall with ISO hip adduction with playground ball  20x 20x 20x  20x                     Ther Ex        Elliptical L2 6 min L3 6 min L3 7 min  L2 7 min   Hamstring Stretch 30" hd 3x 3x  3x  3x   ITB stretch 30" hd bilat 3x 3x  3x  3x   Quad Set        LAQ bilat 30x 3# 20x 2# 20x 3#  30X 3#   TKE Right  20x blue  20x Blue  30x Blue   Seated ISO hip ADD with PGB 10" hd 10x    10x                                                       Ther Activity                          Gait Training                          Modalities

## 2021-08-26 ENCOUNTER — OFFICE VISIT (OUTPATIENT)
Dept: PHYSICAL THERAPY | Facility: CLINIC | Age: 61
End: 2021-08-26
Payer: COMMERCIAL

## 2021-08-26 DIAGNOSIS — M17.11 PRIMARY OSTEOARTHRITIS OF RIGHT KNEE: Primary | ICD-10-CM

## 2021-08-26 DIAGNOSIS — M94.261 CHONDROMALACIA OF KNEE, RIGHT: ICD-10-CM

## 2021-08-26 PROCEDURE — 97140 MANUAL THERAPY 1/> REGIONS: CPT | Performed by: PHYSICAL THERAPIST

## 2021-08-26 PROCEDURE — 97112 NEUROMUSCULAR REEDUCATION: CPT | Performed by: PHYSICAL THERAPIST

## 2021-08-26 NOTE — PROGRESS NOTES
Daily Note     Today's date: 2021  Patient name: Garnell Apgar  : 1960  MRN: [id number]  Referring provider: Martín Ruggiero DO  Dx:   Encounter Diagnosis     ICD-10-CM    1  Primary osteoarthritis of right knee  M17 11    2  Chondromalacia of knee, right  M94 261                   Subjective: Pt reports frustration with feeling pain on a daily basis      Objective: See treatment diary below      Assessment: Tolerated treatment well  Patient would benefit from continued PT      Plan: Progress treatment as tolerated         Precautions L Inner thigh pain        Manuals      MFR to R ITB  15 min 15 min 15 min      R PF mobs                             Neuro Re-Ed         Tandem stance 30" hd on foam   3x 3x 30" hold  3x 3x    SLS 30" hd on foam 3x 3x 15" hold 3x  3x     BOSU lunges 10x 10x 10x 10x     BOSU march  2' 3 min  3 min 3'     Ball squats against wall with ISO hip adduction with playground ball  20x 20x 20x 20x                      Ther Ex        Elliptical L2 6 min L3 6 min L3 7 min L3 7 min    Hamstring Stretch 30" hd 3x 3x  3x 3x    ITB stretch 30" hd bilat 3x 3x  3x 3x    Quad Set        LAQ bilat 30x 3# 20x 2# 20x 3# 20x 3#    TKE Right  20x blue  20x Blue 20x Blue    Seated ISO hip ADD with PGB 10" hd 10x   10x                                                        Ther Activity                          Gait Training                          Modalities

## 2021-08-30 ENCOUNTER — OFFICE VISIT (OUTPATIENT)
Dept: PHYSICAL THERAPY | Facility: CLINIC | Age: 61
End: 2021-08-30
Payer: COMMERCIAL

## 2021-08-30 DIAGNOSIS — M94.261 CHONDROMALACIA OF KNEE, RIGHT: ICD-10-CM

## 2021-08-30 DIAGNOSIS — M17.11 PRIMARY OSTEOARTHRITIS OF RIGHT KNEE: Primary | ICD-10-CM

## 2021-08-30 PROCEDURE — 97110 THERAPEUTIC EXERCISES: CPT

## 2021-08-30 PROCEDURE — 97140 MANUAL THERAPY 1/> REGIONS: CPT

## 2021-08-30 PROCEDURE — 97112 NEUROMUSCULAR REEDUCATION: CPT

## 2021-08-30 NOTE — PROGRESS NOTES
Daily Note     Today's date: 2021  Patient name: Keila Scott  : 1960  MRN: [id number]  Referring provider: Rosmery Sheppard DO  Dx:   Encounter Diagnosis     ICD-10-CM    1  Primary osteoarthritis of right knee  M17 11    2  Chondromalacia of knee, right  M94 261                   Subjective: nothing new to report       Objective: See treatment diary below      Assessment: Patient progressing well towards goals  Good execution of exercise with program  Patient demonstrated the appropriate amount of fatigue for program   Will continue to monitor pain levels and progress as able      Plan: Continue per plan of care  Progress treatment as tolerated         Precautions L Inner thigh pain        Manuals     MFR to R ITB  15 min 15 min 15 min  15 min    R PF mobs                             Neuro Re-Ed         Tandem stance 30" hd on foam   3x 3x 30" hold  3x 3x 3x   SLS 30" hd on foam 3x 3x 15" hold 3x  3x 3x    BOSU lunges 10x 10x 10x 10x 10x    BOSU march  2' 3 min  3 min 3' 3'    Ball squats against wall with ISO hip adduction with playground ball  20x 20x 20x 20x 20x                     Ther Ex        Elliptical L2 6 min L3 6 min L3 7 min L3 7 min L3 8 min   Hamstring Stretch 30" hd 3x 3x  3x 3x 3x   ITB stretch 30" hd bilat 3x 3x  3x 3x 3x   Quad Set        LAQ bilat 30x 3# 20x 2# 20x 3# 20x 3# 20x 3#   TKE Right  20x blue  20x Blue 20x Blue 20x blue    Seated ISO hip ADD with PGB 10" hd 10x   10x 10x                                                        Ther Activity                          Gait Training                          Modalities

## 2021-09-02 ENCOUNTER — EVALUATION (OUTPATIENT)
Dept: PHYSICAL THERAPY | Facility: CLINIC | Age: 61
End: 2021-09-02
Payer: COMMERCIAL

## 2021-09-02 DIAGNOSIS — M94.261 CHONDROMALACIA OF KNEE, RIGHT: ICD-10-CM

## 2021-09-02 DIAGNOSIS — M17.11 PRIMARY OSTEOARTHRITIS OF RIGHT KNEE: Primary | ICD-10-CM

## 2021-09-02 PROCEDURE — 97110 THERAPEUTIC EXERCISES: CPT | Performed by: PHYSICAL THERAPIST

## 2021-09-02 NOTE — PROGRESS NOTES
PT Re-Evaluation      Today's date: 2021  Patient name: Eric Bray  : 1960  MRN: [id number]  Referring provider: Lauren Cullen DO  Dx:   Encounter Diagnosis     ICD-10-CM    1  Primary osteoarthritis of right knee  M17 11    2  Chondromalacia of knee, right  M94 261                   Assessment  Assessment details: Pt is a 57 yoF who presents with R knee pain  She reports improved pain while sleeping and sitting  She demonstrates strength and ROM deficits in B LE's  She presents with S/S consistent with Chondromalacia Patella  She would benefit from skilled Physical Therapy to improve the aforementioned deficits progressing to improved function  Impairments: activity intolerance, impaired physical strength, lacks appropriate home exercise program and pain with function    Goals  ST  Decrease pain by 25% - MET  2  Sit 30' - MET  3  Initiate HEP - MET    LT  Sit 2 hours - MET  2   Less than 3 sleep disturbances/week - MET  3  Independent with HEP - Progressing    Plan  Patient would benefit from: skilled physical therapy  Planned therapy interventions: joint mobilization, manual therapy, Rahman taping, neuromuscular re-education, patient education, strengthening, stretching, flexibility, home exercise program, therapeutic activities and therapeutic exercise  Frequency: 1x week  Treatment plan discussed with: patient        Subjective Evaluation    History of Present Illness  Mechanism of injury: Pt reports she tore the meniscus in her R knee in in May 2019 when she stood up and took a step    She reports she has been in pain since  Pain  Current pain ratin  At best pain ratin  At worst pain ratin  Aggravating factors: sitting    Treatments  Current treatment: physical therapy  Patient Goals  Patient goals for therapy: decreased edema, decreased pain, increased strength and return to sport/leisure activities          Objective     Active Range of Motion   Left Hip Flexion: 111 degrees   Extension: -8 degrees   External rotation (90/90): [facility] [place]  Internal rotation (90/90): WFL    Right Hip   Flexion: 98 degrees   Extension: -9 degrees   External rotation (90/90): [facility] [place]  Internal rotation (90/90): WFL    Left Knee   Flexion: WFL  Extension: -3 degrees     Right Knee   Flexion: WFL  Extension: -4 degrees     Left Ankle/Foot   Normal active range of motion    Right Ankle/Foot   Normal active range of motion    Patellar Static Positioning     Additional Patellar Static Positioning Details  Medial Rotation R>L    Strength/Myotome Testing     Left Hip   Planes of Motion   Flexion: 4-  Extension: 2-  Abduction: 2+  External rotation: 4  Internal rotation: 4    Right Hip   Planes of Motion   Flexion: 4  Extension: 2-  Abduction: 2+  External rotation: 4  Internal rotation: 4    Left Knee   Flexion: 4-  Extension: 2+    Right Knee   Flexion: 4-  Extension: 2+    Left Ankle/Foot   Dorsiflexion: 4+  Plantar flexion: 5  Inversion: 4  Eversion: 4    Right Ankle/Foot   Dorsiflexion: 4+  Plantar flexion: 5  Inversion: 4  Eversion: 4+    Tests     Left Knee   Negative anterior Lachman, posterior sag, valgus stress test at 0 degrees and varus stress test at 0 degrees  Right Knee   Negative anterior Lachman, posterior sag, valgus stress test at 0 degrees and varus stress test at 0 degrees       Additional Tests Details  Hamstring Flexibility R: -21, L: -21  Pavel: (+) on Left      Precautions L Inner thigh pain        Manuals  9/2 8/19 8/23 8/26 8/30   MFR to R ITB  15 min 15 min 15 min   15 min    R PF mobs                                         Neuro Re-Ed              Tandem stance 30" hd on foam     3x 30" hold  3x 3x 3x   SLS 30" hd on foam  3x 15" hold 3x  3x 3x    BOSU lunges  10x 10x 10x 10x    BOSU march   3 min  3 min 3' 3'    Ball squats against wall with ISO hip adduction with playground ball   20x 20x 20x 20x                             Ther Ex            Elliptical L3 8 min L3 6 min L3 7 min L3 7 min L3 8 min   Hamstring Stretch 30" hd  3x  3x 3x 3x   ITB stretch 30" hd bilat  3x  3x 3x 3x   Quad Set            LAQ bilat  20x 2# 20x 3# 20x 3# 20x 3#   TKE Right  20x blue  20x Blue 20x Blue 20x blue    Seated ISO hip ADD with PGB 10" hd      10x 10x                                                                                  Ther Activity                                      Gait Training                                       Modalities

## 2021-09-09 ENCOUNTER — APPOINTMENT (OUTPATIENT)
Dept: PHYSICAL THERAPY | Facility: CLINIC | Age: 61
End: 2021-09-09
Payer: COMMERCIAL

## 2021-09-14 ENCOUNTER — TELEMEDICINE (OUTPATIENT)
Dept: PSYCHIATRY | Facility: CLINIC | Age: 61
End: 2021-09-14
Payer: COMMERCIAL

## 2021-09-14 DIAGNOSIS — F31.9 BIPOLAR DISORDER WITH DEPRESSION (HCC): ICD-10-CM

## 2021-09-14 DIAGNOSIS — F31.9 BIPOLAR DEPRESSION (HCC): Primary | ICD-10-CM

## 2021-09-14 DIAGNOSIS — F31.9 BIPOLAR I DISORDER WITH DEPRESSION (HCC): ICD-10-CM

## 2021-09-14 PROCEDURE — 99214 OFFICE O/P EST MOD 30 MIN: CPT | Performed by: NURSE PRACTITIONER

## 2021-09-14 RX ORDER — CLONAZEPAM 0.5 MG/1
TABLET ORAL
Qty: 180 TABLET | Refills: 1 | Status: SHIPPED | OUTPATIENT
Start: 2021-09-14 | End: 2022-02-08 | Stop reason: SDUPTHER

## 2021-09-14 RX ORDER — GABAPENTIN 300 MG/1
CAPSULE ORAL
Qty: 270 CAPSULE | Refills: 2 | Status: SHIPPED | OUTPATIENT
Start: 2021-09-14 | End: 2022-02-08 | Stop reason: SDUPTHER

## 2021-09-14 RX ORDER — CLONAZEPAM 0.5 MG/1
0.5 TABLET ORAL 2 TIMES DAILY
Qty: 60 TABLET | Refills: 0 | Status: SHIPPED | OUTPATIENT
Start: 2021-09-14 | End: 2021-10-26 | Stop reason: SDUPTHER

## 2021-09-14 NOTE — PSYCH
TREATMENT PLAN (Medication Management Only)  Treatment Plan done but not signed at time of office visit due to:  Plan reviewed by phone or in person  and verbal consent given due to [address]    Name and Date of Birth:  Meek Schwarz 57 y o  1960  Date of Treatment Plan: September 14, 2021  Diagnosis/Diagnoses:    1  Bipolar depression ([facility] )    2  Bipolar I disorder with depression ([facility] )    3  Bipolar disorder with depression [facility])      Strengths/Personal Resources for Self-Care: supportive family, supportive friends  Area/Areas of need (in own words): anxiety symptoms  1  Long Term Goal: alleviate anxiety  Target Date: 6 months - 3/14/2022  Person/Persons responsible for completion of goal: Maia  2  Short Term Objective (s) - How will we reach this goal?:   A  Provider new recommended medication/dosage changes and/or continue medication(s): continue current medications as prescribed  B   N/A  Target Date: 6 months - 3/14/2022  Person/Persons Responsible for Completion of Goal: Maia  Progress Towards Goals: stable  Treatment Modality: medication education at every visit  Review due 6 months from date of this plan: 6 months - 3/14/2022  Expected length of service: over 1 year  My Physician/PA/NP and I have developed this plan together and I agree to work on the goals and objectives  I understand the treatment goals that were developed for my treatment

## 2021-09-14 NOTE — PSYCH
Virtual Regular Visit    Verification of patient location:    Patient is located in the following state in which I hold an active license [place]      Assessment/Plan:    Problem List Items Addressed This Visit     None      Visit Diagnoses     Bipolar depression ([facility] [address] )    -  Primary    Relevant Medications    clonazePAM (KlonoPIN) 0 5 mg tablet    Bipolar I disorder with depression ([facility] [address] )        Relevant Medications    clonazePAM (KlonoPIN) 0 5 mg tablet    gabapentin (NEURONTIN) 300 mg capsule    Bipolar disorder with depression ([facility] [address] )        Relevant Medications    gabapentin (NEURONTIN) 300 mg capsule          Goals addressed in session: Reduce anxiety         Reason for visit is   Chief Complaint   Patient presents with    Virtual Regular Visit        Encounter provider RUSSELL Avitia    Provider located at [address] [id number]      Recent Visits  No visits were found meeting these conditions  Showing recent visits within past 7 days and meeting all other requirements  Future Appointments  No visits were found meeting these conditions  Showing future appointments within next 150 days and meeting all other requirements       The patient was identified by name and date of birth  Kanwal Perez was informed that this is a telemedicine visit and [facility] and patient was informed that this is a secure, HIPAA-compliant platform  She agrees to proceed     My office door was closed  No one else was in the room  She acknowledged consent and understanding of privacy and security of the video platform  The patient has agreed to participate and understands they can discontinue the visit at any time  Patient is aware this is a billable service       Subjective  Kanwal Perez is a 57 y o  female  Who is seen today for medication management appointment for treatment of bipolar depression  Overall she is doing fairly well except some breakthrough symptoms of anxiety  She is questioning her [employer] right now and I have recommended that she speak with a member of the clergy which she has been seeking out  Suffer from that, she is enjoying her life  She has a great support system with her  and children and she has wonderful grandchildren  She spends a lot of time with them  She is doing the best in her skilled nursing  She will continue on: Wellbutrin tablet 75 mg daily   Gabapentin 100 mg in the morning and 200 mg in the evening   Trazodone 100 mg HS  Klonopin 0 5 mg b i d  Effexor XR 75 mg daily  Follow-up with me in 6 weeks  Mental status exam:  Patient is awake and alert and oriented x3  Mood is mildly anxious  Patient is not suicidal, not homicidal and not psychotic  Speech is clear and thoughts are linear  Attention span is good  Impulse control is good  Judgment and insight are good  Memory is good  Jaelyn AMIN     Past Medical History:   Diagnosis Date    Anxiety     Arthritis     Back pain     Lower back    Bipolar 1 disorder (HCC)     Depression     Diverticulosis     Hip dysplasia     Bilateral    Hyperlipidemia     Knee pain, right     Obesity     Psoriasis     Along hairline      Stress incontinence        Past Surgical History:   Procedure Laterality Date    COLONOSCOPY      DILATION AND CURETTAGE OF UTERUS      One D&C and one D&E    ESOPHAGOGASTRODUODENOSCOPY      NASAL SEPTUM SURGERY      AK KNEE SCOPE,MED/LAT MENISECTOMY Right 9/27/2018    Procedure: ARTHROSCOPY KNEE; PARTIAL MEDIAL MENISECTOMY; CHONDROPLASTY;  Surgeon: Naina Davis DO;  Location: [facility];  Service: Orthopedics    TONSILECTOMY AND ADNOIDECTOMY      TONSILLECTOMY         Current Outpatient Medications   Medication Sig Dispense Refill    buPROPion (WELLBUTRIN) 75 mg tablet TAKE 1 TABLET BY MOUTH  DAILY 90 tablet 3    gabapentin (NEURONTIN) 300 mg capsule 1 in AM and 2  capsule 2    traZODone (DESYREL) 100 mg tablet TAKE 1 TO 2 TABLETS BY  MOUTH AT BEDTIME 180 tablet 3    acetaminophen (TYLENOL) 500 mg tablet Take 500 mg by mouth every 6 (six) hours as needed for mild pain      clonazePAM (KlonoPIN) 0 5 mg tablet 1 in AM and 1 in afternoon 180 tablet 1    clonazePAM (KlonoPIN) 0 5 mg tablet Take 1 tablet (0 5 mg total) by mouth 2 (two) times a day 60 tablet 0    Multiple Vitamin (MULTIVITAMIN) capsule Take 1 capsule by mouth daily      oxybutynin (DITROPAN-XL) 10 MG 24 hr tablet Take 10 mg by mouth daily at bedtime      venlafaxine (EFFEXOR-XR) 75 mg 24 hr capsule Take 1 capsule (75 mg total) by mouth daily 90 capsule 3     No current facility-administered medications for this visit  Allergies   Allergen Reactions    Banana - Food Allergy Hives     Itching and swelling     Crab (Diagnostic) - Food Allergy Hives    Cleocin [Clindamycin]      Causes C Diff    Latex      Pt told not to be in contact with latex because of banana allergy    Penicillins Other (See Comments)     Causes vaginal itching and must take Diflucan with it       Review of Systems    Video Exam    There were no vitals filed for this visit  Physical Exam     I spent 25 minutes with patient today in which greater than 50% of the time was spent in counseling/coordination of care regarding Medication management, treatment and chart review    VIRTUAL VISIT [facility] verbally agrees to participate in [email or web address]  Pt is aware that [employer] could be limited without vital signs or the ability to perform a full hands-on physical Collene Gowers understands she or the provider may request at any time to terminate the video visit and request the patient to seek care or treatment in person

## 2021-10-01 ENCOUNTER — APPOINTMENT (OUTPATIENT)
Dept: LAB | Facility: CLINIC | Age: 61
End: 2021-10-01
Payer: COMMERCIAL

## 2021-10-01 DIAGNOSIS — E78.5 HYPERLIPIDEMIA, UNSPECIFIED HYPERLIPIDEMIA TYPE: ICD-10-CM

## 2021-10-01 DIAGNOSIS — R79.9 ABNORMAL BLOOD CHEMISTRY: ICD-10-CM

## 2021-10-01 LAB
ALBUMIN SERPL BCP-MCNC: 3.7 G/DL (ref 3.5–5)
ALP SERPL-CCNC: 100 U/L (ref 46–116)
ALT SERPL W P-5'-P-CCNC: 39 U/L (ref 12–78)
ANION GAP SERPL CALCULATED.3IONS-SCNC: 4 MMOL/L (ref 4–13)
AST SERPL W P-5'-P-CCNC: 27 U/L (ref 5–45)
BILIRUB SERPL-MCNC: 0.81 MG/DL (ref 0.2–1)
BUN SERPL-MCNC: 14 MG/DL (ref 5–25)
CALCIUM SERPL-MCNC: 9.5 MG/DL (ref 8.3–10.1)
CHLORIDE SERPL-SCNC: 109 MMOL/L (ref 100–108)
CHOLEST SERPL-MCNC: 124 MG/DL (ref 50–200)
CO2 SERPL-SCNC: 25 MMOL/L (ref 21–32)
CREAT SERPL-MCNC: 0.94 MG/DL (ref 0.6–1.3)
ERYTHROCYTE [DISTWIDTH] IN BLOOD BY AUTOMATED COUNT: 15.4 % (ref 11.6–15.1)
EST. AVERAGE GLUCOSE BLD GHB EST-MCNC: 148 MG/DL
GFR SERPL CREATININE-BSD FRML MDRD: 65 ML/MIN/1.73SQ M
GLUCOSE P FAST SERPL-MCNC: 135 MG/DL (ref 65–99)
HBA1C MFR BLD: 6.8 %
HCT VFR BLD AUTO: 38.7 % (ref 34.8–46.1)
HDLC SERPL-MCNC: 37 MG/DL
HGB BLD-MCNC: 11.8 G/DL (ref 11.5–15.4)
LDLC SERPL CALC-MCNC: 58 MG/DL (ref 0–100)
MCH RBC QN AUTO: 23.1 PG (ref 26.8–34.3)
MCHC RBC AUTO-ENTMCNC: 30.5 G/DL (ref 31.4–37.4)
MCV RBC AUTO: 76 FL (ref 82–98)
NONHDLC SERPL-MCNC: 87 MG/DL
PLATELET # BLD AUTO: 183 THOUSANDS/UL (ref 149–390)
PMV BLD AUTO: 12.4 FL (ref 8.9–12.7)
POTASSIUM SERPL-SCNC: 4 MMOL/L (ref 3.5–5.3)
PROT SERPL-MCNC: 7.4 G/DL (ref 6.4–8.2)
RBC # BLD AUTO: 5.1 MILLION/UL (ref 3.81–5.12)
SODIUM SERPL-SCNC: 138 MMOL/L (ref 136–145)
TRIGL SERPL-MCNC: 144 MG/DL
WBC # BLD AUTO: 4.9 THOUSAND/UL (ref 4.31–10.16)

## 2021-10-01 PROCEDURE — 85027 COMPLETE CBC AUTOMATED: CPT

## 2021-10-01 PROCEDURE — 36415 COLL VENOUS BLD VENIPUNCTURE: CPT

## 2021-10-01 PROCEDURE — 80061 LIPID PANEL: CPT

## 2021-10-01 PROCEDURE — 80053 COMPREHEN METABOLIC PANEL: CPT

## 2021-10-01 PROCEDURE — 83036 HEMOGLOBIN GLYCOSYLATED A1C: CPT

## 2021-10-20 ENCOUNTER — HOSPITAL ENCOUNTER (OUTPATIENT)
Dept: MAMMOGRAPHY | Facility: HOSPITAL | Age: 61
Discharge: HOME | End: 2021-10-20
Attending: FAMILY MEDICINE
Payer: COMMERCIAL

## 2021-10-20 VITALS — WEIGHT: 245 LBS | BODY MASS INDEX: 41.83 KG/M2 | HEIGHT: 64 IN

## 2021-10-20 DIAGNOSIS — Z12.31 ENCOUNTER FOR SCREENING MAMMOGRAM FOR MALIGNANT NEOPLASM OF BREAST: ICD-10-CM

## 2021-10-20 PROCEDURE — 77063 BREAST TOMOSYNTHESIS BI: CPT

## 2021-10-20 PROCEDURE — 77067 SCR MAMMO BI INCL CAD: CPT

## 2021-10-26 ENCOUNTER — OFFICE VISIT (OUTPATIENT)
Dept: PSYCHIATRY | Facility: CLINIC | Age: 61
End: 2021-10-26
Payer: COMMERCIAL

## 2021-10-26 DIAGNOSIS — F33.1 MODERATE EPISODE OF RECURRENT MAJOR DEPRESSIVE DISORDER (HCC): Primary | ICD-10-CM

## 2021-10-26 DIAGNOSIS — F33.1 MAJOR DEPRESSIVE DISORDER, RECURRENT, MODERATE (HCC): ICD-10-CM

## 2021-10-26 DIAGNOSIS — R37 SEXUAL DYSFUNCTION: ICD-10-CM

## 2021-10-26 DIAGNOSIS — F31.9 BIPOLAR I DISORDER WITH DEPRESSION (HCC): ICD-10-CM

## 2021-10-26 PROCEDURE — 99214 OFFICE O/P EST MOD 30 MIN: CPT | Performed by: NURSE PRACTITIONER

## 2021-10-26 RX ORDER — BUPROPION HYDROCHLORIDE 100 MG/1
100 TABLET, EXTENDED RELEASE ORAL DAILY
Qty: 30 TABLET | Refills: 2 | Status: SHIPPED | OUTPATIENT
Start: 2021-10-26 | End: 2021-11-23

## 2021-10-26 RX ORDER — TRAZODONE HYDROCHLORIDE 100 MG/1
TABLET ORAL
Qty: 180 TABLET | Refills: 3
Start: 2021-10-26 | End: 2021-12-15 | Stop reason: SDUPTHER

## 2021-10-26 RX ORDER — VENLAFAXINE HYDROCHLORIDE 75 MG/1
75 CAPSULE, EXTENDED RELEASE ORAL DAILY
Qty: 90 CAPSULE | Refills: 3 | Status: SHIPPED | OUTPATIENT
Start: 2021-10-26 | End: 2021-11-24 | Stop reason: DRUGHIGH

## 2021-10-26 RX ORDER — VENLAFAXINE HYDROCHLORIDE 75 MG/1
75 CAPSULE, EXTENDED RELEASE ORAL DAILY
Qty: 30 CAPSULE | Refills: 1 | Status: SHIPPED | OUTPATIENT
Start: 2021-10-26 | End: 2021-11-24 | Stop reason: SDUPTHER

## 2021-10-27 ENCOUNTER — IMMUNIZATIONS (OUTPATIENT)
Dept: FAMILY MEDICINE CLINIC | Facility: HOSPITAL | Age: 61
End: 2021-10-27

## 2021-10-27 DIAGNOSIS — Z23 ENCOUNTER FOR IMMUNIZATION: Primary | ICD-10-CM

## 2021-11-09 ENCOUNTER — TELEPHONE (OUTPATIENT)
Dept: DIABETES SERVICES | Facility: CLINIC | Age: 61
End: 2021-11-09

## 2021-11-18 ENCOUNTER — OFFICE VISIT (OUTPATIENT)
Dept: ENDOCRINOLOGY | Facility: OTHER | Age: 61
End: 2021-11-18
Payer: COMMERCIAL

## 2021-11-18 VITALS — BODY MASS INDEX: 39.49 KG/M2 | HEIGHT: 65 IN | WEIGHT: 237 LBS

## 2021-11-18 DIAGNOSIS — E11.9 TYPE 2 DIABETES MELLITUS WITHOUT COMPLICATION, WITHOUT LONG-TERM CURRENT USE OF INSULIN (HCC): Primary | ICD-10-CM

## 2021-11-18 PROCEDURE — 97802 MEDICAL NUTRITION INDIV IN: CPT | Performed by: DIETITIAN, REGISTERED

## 2021-11-23 ENCOUNTER — OFFICE VISIT (OUTPATIENT)
Dept: PSYCHIATRY | Facility: CLINIC | Age: 61
End: 2021-11-23
Payer: COMMERCIAL

## 2021-11-23 DIAGNOSIS — F33.1 MODERATE EPISODE OF RECURRENT MAJOR DEPRESSIVE DISORDER (HCC): ICD-10-CM

## 2021-11-23 PROCEDURE — 99214 OFFICE O/P EST MOD 30 MIN: CPT | Performed by: NURSE PRACTITIONER

## 2021-11-23 RX ORDER — VENLAFAXINE HYDROCHLORIDE 37.5 MG/1
37.5 CAPSULE, EXTENDED RELEASE ORAL DAILY
Qty: 30 CAPSULE | Refills: 2 | Status: SHIPPED | OUTPATIENT
Start: 2021-11-23 | End: 2021-12-15 | Stop reason: SDUPTHER

## 2021-11-23 RX ORDER — BUPROPION HYDROCHLORIDE 100 MG/1
200 TABLET, EXTENDED RELEASE ORAL DAILY
Qty: 180 TABLET | Refills: 1 | Status: SHIPPED | OUTPATIENT
Start: 2021-11-23 | End: 2021-12-15 | Stop reason: SDUPTHER

## 2021-11-30 ENCOUNTER — NURSE TRIAGE (OUTPATIENT)
Dept: OTHER | Facility: OTHER | Age: 61
End: 2021-11-30

## 2021-11-30 DIAGNOSIS — R09.89 SUSPECTED SARS: Primary | ICD-10-CM

## 2021-11-30 PROCEDURE — U0003 INFECTIOUS AGENT DETECTION BY NUCLEIC ACID (DNA OR RNA); SEVERE ACUTE RESPIRATORY SYNDROME CORONAVIRUS 2 (SARS-COV-2) (CORONAVIRUS DISEASE [COVID-19]), AMPLIFIED PROBE TECHNIQUE, MAKING USE OF HIGH THROUGHPUT TECHNOLOGIES AS DESCRIBED BY CMS-2020-01-R: HCPCS | Performed by: FAMILY MEDICINE

## 2021-11-30 PROCEDURE — U0005 INFEC AGEN DETEC AMPLI PROBE: HCPCS | Performed by: FAMILY MEDICINE

## 2021-12-01 LAB — SARS-COV-2 RNA RESP QL NAA+PROBE: NEGATIVE

## 2021-12-15 ENCOUNTER — OFFICE VISIT (OUTPATIENT)
Dept: PSYCHIATRY | Facility: CLINIC | Age: 61
End: 2021-12-15
Payer: COMMERCIAL

## 2021-12-15 DIAGNOSIS — F31.9 BIPOLAR I DISORDER WITH DEPRESSION (HCC): ICD-10-CM

## 2021-12-15 DIAGNOSIS — F33.1 MODERATE EPISODE OF RECURRENT MAJOR DEPRESSIVE DISORDER (HCC): ICD-10-CM

## 2021-12-15 PROCEDURE — 99214 OFFICE O/P EST MOD 30 MIN: CPT | Performed by: NURSE PRACTITIONER

## 2021-12-15 RX ORDER — TRAZODONE HYDROCHLORIDE 100 MG/1
TABLET ORAL
Qty: 180 TABLET | Refills: 3
Start: 2021-12-15 | End: 2022-02-08

## 2021-12-15 RX ORDER — VENLAFAXINE HYDROCHLORIDE 37.5 MG/1
CAPSULE, EXTENDED RELEASE ORAL
Qty: 30 CAPSULE | Refills: 2
Start: 2021-12-15 | End: 2021-12-23 | Stop reason: SDUPTHER

## 2021-12-15 RX ORDER — BUPROPION HYDROCHLORIDE 100 MG/1
TABLET, EXTENDED RELEASE ORAL
Qty: 180 TABLET | Refills: 1
Start: 2021-12-15 | End: 2022-01-11

## 2021-12-23 DIAGNOSIS — F33.1 MODERATE EPISODE OF RECURRENT MAJOR DEPRESSIVE DISORDER (HCC): ICD-10-CM

## 2021-12-23 RX ORDER — VENLAFAXINE HYDROCHLORIDE 37.5 MG/1
CAPSULE, EXTENDED RELEASE ORAL
Qty: 30 CAPSULE | Refills: 2
Start: 2021-12-23 | End: 2022-01-11 | Stop reason: ALTCHOICE

## 2021-12-29 ENCOUNTER — APPOINTMENT (OUTPATIENT)
Dept: LAB | Facility: CLINIC | Age: 61
End: 2021-12-29
Payer: COMMERCIAL

## 2021-12-29 DIAGNOSIS — E61.1 IRON DEFICIENCY: ICD-10-CM

## 2021-12-29 DIAGNOSIS — E78.5 HYPERLIPIDEMIA, UNSPECIFIED HYPERLIPIDEMIA TYPE: ICD-10-CM

## 2021-12-29 DIAGNOSIS — E11.9 TYPE 2 DIABETES MELLITUS WITHOUT COMPLICATION, WITHOUT LONG-TERM CURRENT USE OF INSULIN (HCC): ICD-10-CM

## 2021-12-29 LAB
ALBUMIN SERPL BCP-MCNC: 3.9 G/DL (ref 3.5–5)
ALP SERPL-CCNC: 77 U/L (ref 46–116)
ALT SERPL W P-5'-P-CCNC: 34 U/L (ref 12–78)
ANION GAP SERPL CALCULATED.3IONS-SCNC: 5 MMOL/L (ref 4–13)
AST SERPL W P-5'-P-CCNC: 21 U/L (ref 5–45)
BILIRUB SERPL-MCNC: 0.42 MG/DL (ref 0.2–1)
BUN SERPL-MCNC: 13 MG/DL (ref 5–25)
CALCIUM SERPL-MCNC: 9.8 MG/DL (ref 8.3–10.1)
CHLORIDE SERPL-SCNC: 111 MMOL/L (ref 100–108)
CHOLEST SERPL-MCNC: 120 MG/DL
CO2 SERPL-SCNC: 25 MMOL/L (ref 21–32)
CREAT SERPL-MCNC: 0.93 MG/DL (ref 0.6–1.3)
ERYTHROCYTE [DISTWIDTH] IN BLOOD BY AUTOMATED COUNT: 17.4 % (ref 11.6–15.1)
EST. AVERAGE GLUCOSE BLD GHB EST-MCNC: 123 MG/DL
FERRITIN SERPL-MCNC: 6 NG/ML (ref 8–388)
GFR SERPL CREATININE-BSD FRML MDRD: 66 ML/MIN/1.73SQ M
GLUCOSE P FAST SERPL-MCNC: 108 MG/DL (ref 65–99)
HBA1C MFR BLD: 5.9 %
HCT VFR BLD AUTO: 41.1 % (ref 34.8–46.1)
HDLC SERPL-MCNC: 37 MG/DL
HGB BLD-MCNC: 12.3 G/DL (ref 11.5–15.4)
IRON SATN MFR SERPL: 8 % (ref 15–50)
IRON SERPL-MCNC: 30 UG/DL (ref 50–170)
LDLC SERPL CALC-MCNC: 61 MG/DL (ref 0–100)
MCH RBC QN AUTO: 23.5 PG (ref 26.8–34.3)
MCHC RBC AUTO-ENTMCNC: 29.9 G/DL (ref 31.4–37.4)
MCV RBC AUTO: 79 FL (ref 82–98)
NONHDLC SERPL-MCNC: 83 MG/DL
PLATELET # BLD AUTO: 185 THOUSANDS/UL (ref 149–390)
PMV BLD AUTO: 12.6 FL (ref 8.9–12.7)
POTASSIUM SERPL-SCNC: 4.1 MMOL/L (ref 3.5–5.3)
PROT SERPL-MCNC: 7.2 G/DL (ref 6.4–8.2)
RBC # BLD AUTO: 5.23 MILLION/UL (ref 3.81–5.12)
SODIUM SERPL-SCNC: 141 MMOL/L (ref 136–145)
TIBC SERPL-MCNC: 378 UG/DL (ref 250–450)
TRIGL SERPL-MCNC: 108 MG/DL
WBC # BLD AUTO: 5.92 THOUSAND/UL (ref 4.31–10.16)

## 2021-12-29 PROCEDURE — 80053 COMPREHEN METABOLIC PANEL: CPT

## 2021-12-29 PROCEDURE — 83036 HEMOGLOBIN GLYCOSYLATED A1C: CPT

## 2021-12-29 PROCEDURE — 36415 COLL VENOUS BLD VENIPUNCTURE: CPT

## 2021-12-29 PROCEDURE — 85027 COMPLETE CBC AUTOMATED: CPT

## 2021-12-29 PROCEDURE — 82728 ASSAY OF FERRITIN: CPT

## 2021-12-29 PROCEDURE — 80061 LIPID PANEL: CPT

## 2021-12-29 PROCEDURE — 83550 IRON BINDING TEST: CPT

## 2021-12-29 PROCEDURE — 83540 ASSAY OF IRON: CPT

## 2022-01-11 ENCOUNTER — OFFICE VISIT (OUTPATIENT)
Dept: PSYCHIATRY | Facility: CLINIC | Age: 62
End: 2022-01-11
Payer: COMMERCIAL

## 2022-01-11 DIAGNOSIS — F33.1 MODERATE EPISODE OF RECURRENT MAJOR DEPRESSIVE DISORDER (HCC): ICD-10-CM

## 2022-01-11 PROCEDURE — 99214 OFFICE O/P EST MOD 30 MIN: CPT | Performed by: NURSE PRACTITIONER

## 2022-01-11 RX ORDER — BUPROPION HYDROCHLORIDE 100 MG/1
TABLET, EXTENDED RELEASE ORAL
Qty: 270 TABLET | Refills: 1 | Status: SHIPPED | OUTPATIENT
Start: 2022-01-11 | End: 2022-06-02

## 2022-01-11 NOTE — PSYCH
PROGRESS NOTE        [place] [place] [facility]      Name and Date of Birth:  Nel Turner 57 y o  1960    Date of Visit: 01/11/22    SUBJECTIVE:  Patient is a 76-year-old female has a history of major depressive disorder bipolar depression  On examination today, she is doing well with her current medication regimen  We have successfully taken her off of Effexor and now she is on Wellbutrin only for depression  She is telling me she is having some episodes forgetfulness and word-finding issues and some mild cognitive issues  Could be related to the medications so I am having her split the doses of Wellbutrin throughout the day  We did complete a [place] today and his score was 25/30  We will continue to monitor closely   notices some mild incidence of forgetfulness but nothing overt  Again, we will keep an eye on it  She will continue with word finding and crosswords and also watching some game shows that test your  knowledge  Follow-up in 1 month  She denies suicidal ideation, intent or plan at present, has no suicidal ideation, intent or plan at present  She denies any auditory hallucinations and visual hallucinations, denies any other delusional thinking, denies any delusional thinking  She denies any side effects from medications    HPI ROS Appetite Changes and Sleep: normal appetite, normal sleep    Review Of Systems:      Constitutional Negative   ENT Negative   Cardiovascular Negative   Respiratory As Noted in HPI   Gastrointestinal Negative   Genitourinary Negative   Musculoskeletal Negative   Integumentary Negative   Neurological Negative   Endocrine Negative   Other Symptoms Negative and None       Laboratory Results: No results found for this or any previous visit      Substance Abuse History:    Social History     Substance and Sexual Activity   Drug Use No       Family Psychiatric History:     Family History   Problem Relation Age of Onset    Asthma Mother     No Known Problems Father     Rectal cancer Sister 62    No Known Problems Daughter     No Known Problems Maternal Grandmother     No Known Problems Maternal Grandfather     No Known Problems Paternal Grandmother     No Known Problems Paternal Grandfather     No Known Problems Paternal Aunt        The following portions of the patient's history were reviewed and updated as appropriate: past family history, past medical history, past social history, past surgical history and problem list     Social History     Socioeconomic History    Marital status: /Civil Union     Spouse name: Not on file    Number of children: Not on file    Years of education: Not on file    Highest education level: Not on file   Occupational History    Not on file   Tobacco Use    Smoking status: Never Smoker    Smokeless tobacco: Never Used   Vaping Use    Vaping Use: Never used   Substance and Sexual Activity    Alcohol use: Yes     Comment: rare    Drug use: No    Sexual activity: Not on file   Other Topics Concern    Not on file   Social History Narrative    Not on file     Social Determinants of Health     Financial Resource Strain: Not on file   Food Insecurity: Not on file   Transportation Needs: Not on file   Physical Activity: Not on file   Stress: Not on file   Social Connections: Not on file   Intimate Partner Violence: Not on file   Housing Stability: Not on file     Social History     Social History Narrative    Not on file        Social History     Tobacco History     Smoking Status  Never Smoker    Smokeless Tobacco Use  Never Used          Alcohol History     Alcohol Use Status  Yes Comment  rare          Drug Use     Drug Use Status  No          Sexual Activity     Sexually Active  Not Asked          Activities of Daily Living    Not Asked                     OBJECTIVE:     Mental Status Evaluation:    Appearance age appropriate, casually dressed   Behavior pleasant, cooperative   Speech normal volume, normal pitch   Mood Stable mood  Less depressed   Affect brighter   Thought Processes logical   Associations intact associations   Thought Content Normal   Perceptual Disturbances: None   Abnormal Thoughts  Risk Potential Suicidal ideation - None  Homicidal ideation - None  Potential for aggression - No   Orientation oriented to person, place, time/date and situation   Memory Notes some mild cognitive issues  Could be med related  Cosciousness alert and awake   Attention Span attention span and concentration are age appropriate   Intellect Appears to be of Average Intelligence   Insight Good   Judgement Good   Muscle Strength and  Gait muscle strength and tone were normal   Language no difficulty naming common objects   Fund of Knowledge displays adequate knowledge of current events   Pain None   Pain Scale 0       Assessment/Plan:       Diagnoses and all orders for this visit:    Moderate episode of recurrent major depressive disorder (HCC)  -     buPROPion (Wellbutrin SR) 100 mg 12 hr tablet; 1 Tab Breakfast, lunch and dinner          Treatment Recommendations/Precautions:    Continue current medications: Wellbutrin  mg, breakfast lunch and dinner   Klonopin 0 5 mg b i d    Gabapentin 300 mg in the morning and 600 mg HS  Trazodone 100 mg HS    Risks/Benefits      Risks, Benefits And Possible Side Effects Of Medications:    Risks, benefits, and possible side effects of medications explained to patient and patient verbalizes understanding and agreement for treatment      Controlled Medication Discussion:  No history of any overuse or abuse of controlled substances    Not applicable    Psychotherapy Provided:     Individual psychotherapy provided: No

## 2022-01-20 ENCOUNTER — TELEPHONE (OUTPATIENT)
Dept: OTHER | Facility: OTHER | Age: 62
End: 2022-01-20

## 2022-01-20 NOTE — TELEPHONE ENCOUNTER
Patient called to cancel her appointment due to the weather she will call back to reschedule it      Thursday Jan 20, 2022  10:00 AM  FOLLOW UP PG  Berna Shanks [facility]

## 2022-02-08 ENCOUNTER — OFFICE VISIT (OUTPATIENT)
Dept: PSYCHIATRY | Facility: CLINIC | Age: 62
End: 2022-02-08
Payer: COMMERCIAL

## 2022-02-08 DIAGNOSIS — F31.9 BIPOLAR DISORDER WITH DEPRESSION (HCC): ICD-10-CM

## 2022-02-08 DIAGNOSIS — F31.9 BIPOLAR I DISORDER WITH DEPRESSION (HCC): ICD-10-CM

## 2022-02-08 PROCEDURE — 99214 OFFICE O/P EST MOD 30 MIN: CPT | Performed by: NURSE PRACTITIONER

## 2022-02-08 RX ORDER — CLONAZEPAM 0.5 MG/1
TABLET ORAL
Qty: 180 TABLET | Refills: 1 | Status: SHIPPED | OUTPATIENT
Start: 2022-02-08 | End: 2022-05-10

## 2022-02-08 RX ORDER — GABAPENTIN 300 MG/1
CAPSULE ORAL
Qty: 270 CAPSULE | Refills: 2 | Status: SHIPPED | OUTPATIENT
Start: 2022-02-08 | End: 2022-05-23 | Stop reason: SDUPTHER

## 2022-02-08 RX ORDER — TRAZODONE HYDROCHLORIDE 100 MG/1
TABLET ORAL
Qty: 180 TABLET | Refills: 3
Start: 2022-02-08 | End: 2022-06-28 | Stop reason: SDUPTHER

## 2022-02-08 NOTE — PSYCH
PROGRESS NOTE        [facility]      Name and Date of Birth:  Eliezer Dennis 57 y o  1960    Date of Visit: 02/08/22    SUBJECTIVE:  Patient is a 48-year-old female who has a history of bipolar disorder  She is seen today for continued follow-up regarding her depression and symptoms  She feels good  She has lost approximately 30 lb with being on Trulicity for diabetes  She is also now very diligent about watching her intake of food  Her mood is stable  She is looking forward to this weekend she is having a [employer] Day party for her children and grandchildren  She is sleeping well she is eating well and her mood is good  We will follow up with each other in 8 weeks  She denies suicidal ideation, intent or plan at present, has no suicidal ideation, intent or plan at present  She denies any auditory hallucinations and visual hallucinations, denies any other delusional thinking, denies any delusional thinking  She denies any side effects from medications    HPI ROS Appetite Changes and Sleep: normal appetite, normal sleep    Review Of Systems:      Constitutional Negative   ENT Negative   Cardiovascular Negative   Respiratory As Noted in HPI   Gastrointestinal Negative   Genitourinary Negative   Musculoskeletal Negative   Integumentary Negative   Neurological Negative   Endocrine Negative   Other Symptoms Negative and None       Laboratory Results: No results found for this or any previous visit      Substance Abuse History:    Social History     Substance and Sexual Activity   Drug Use No       Family Psychiatric History:     Family History   Problem Relation Age of Onset    Asthma Mother     No Known Problems Father     Rectal cancer Sister 62    No Known Problems Daughter     No Known Problems Maternal Grandmother     No Known Problems Maternal Grandfather     No Known Problems Paternal Grandmother     No Known Problems Paternal Grandfather     No Known Problems Paternal Aunt        The following portions of the patient's history were reviewed and updated as appropriate: past family history, past medical history, past social history, past surgical history and problem list     Social History     Socioeconomic History    Marital status: /Civil Union     Spouse name: Not on file    Number of children: Not on file    Years of education: Not on file    Highest education level: Not on file   Occupational History    Not on file   Tobacco Use    Smoking status: Never Smoker    Smokeless tobacco: Never Used   Vaping Use    Vaping Use: Never used   Substance and Sexual Activity    Alcohol use: Yes     Comment: rare    Drug use: No    Sexual activity: Not on file   Other Topics Concern    Not on file   Social History Narrative    Not on file     Social Determinants of Health     Financial Resource Strain: Not on file   Food Insecurity: Not on file   Transportation Needs: Not on file   Physical Activity: Not on file   Stress: Not on file   Social Connections: Not on file   Intimate Partner Violence: Not on file   Housing Stability: Not on file     Social History     Social History Narrative    Not on file        Social History     Tobacco History     Smoking Status  Never Smoker    Smokeless Tobacco Use  Never Used          Alcohol History     Alcohol Use Status  Yes Comment  rare          Drug Use     Drug Use Status  No          Sexual Activity     Sexually Active  Not Asked          Activities of Daily Living    Not Asked                     OBJECTIVE:     Mental Status Evaluation:    Appearance age appropriate, casually dressed   Behavior pleasant, cooperative   Speech normal volume, normal pitch   Mood Stable mood    Doing well off lithium and on different mood stabilizer   Affect Bright    Thought Processes logical   Associations intact associations   Thought Content Normal   Perceptual Disturbances: None   Abnormal Thoughts  Risk Potential Suicidal ideation - None  Homicidal ideation - None  Potential for aggression - No   Orientation oriented to person, place, time/date and situation   Memory recent and remote memory grossly intact   Cosciousness alert and awake   Attention Span attention span and concentration are age appropriate   Intellect Appears to be of Average Intelligence   Insight Good   Judgement Good   Muscle Strength and  Gait muscle strength and tone were normal   Language no difficulty naming common objects   Fund of Knowledge displays adequate knowledge of current events   Pain None   Pain Scale 0       Assessment/Plan:       Diagnoses and all orders for this visit:    Bipolar I disorder with depression ([facility] [address] )  -     traZODone (DESYREL) 100 mg tablet; 1/2HS  -     clonazePAM (KlonoPIN) 0 5 mg tablet; 1 in AM and 1 in afternoon  -     gabapentin (NEURONTIN) 300 mg capsule; 1 in AM and 2 HS    Bipolar disorder with depression ([facility])  -     gabapentin (NEURONTIN) 300 mg capsule; 1 in AM and 2 HS          Treatment Recommendations/Precautions:    Continue current medications: Wellbutrin  mg t i d    Klonopin 0 5 mg b i d    Gabapentin 300 mg in the morning and 600 mg HS   Trazodone 100 mg, patient takes 50 mg HS   Follow-up with me in 8 weeks  Risks/Benefits      Risks, Benefits And Possible Side Effects Of Medications:    Risks, benefits, and possible side effects of medications explained to patient and patient verbalizes understanding and agreement for treatment  Controlled Medication Discussion:  Patient has been using Klonopin for some time  Have successfully reduce the dose over time  She uses it about twice a day mostly as needed  There has never been any time where she was filling the prescription early  No history of any overuse or abuse of controlled substances    She is aware not to drive a vehicle or operate any machinery prior to taking clonazepam     Risk assessment:  Based on today's interview, the patient is not a risk to harm herself or others      Not applicable    Psychotherapy Provided:     Individual psychotherapy provided: No

## 2022-02-08 NOTE — PSYCH
TREATMENT PLAN (Medication Management Only)  Treatment Plan done but not signed at time of office visit due to:  Plan reviewed by phone or in person  and verbal consent given due to [address]    Name and Date of Birth:  Esther Amado 57 y o  1960  Date of Treatment Plan: February 8, 2022  Diagnosis/Diagnoses:    1  Bipolar I disorder with depression ([facility] [address] )    2  Bipolar disorder with depression [facility])      Strengths/Personal Resources for Self-Care: supportive family, supportive friends  Area/Areas of need (in own words): "I am feeling good right now "  1  Long Term Goal: "To continue to feel well and be productive"  Target Date: 6 months - 8/8/2022  Person/Persons responsible for completion of goal: Viktor Mckeon  2  Short Term Objective (s) - How will we reach this goal?:   A  Provider new recommended medication/dosage changes and/or continue medication(s): continue current medications as prescribed  B   N/A  Target Date: 6 months - 8/8/2022  Person/Persons Responsible for Completion of Goal: Viktor Mckeon  Progress Towards Goals: stable, continuing treatment  Treatment Modality: medication education at every visit  Review due 6 months from date of this plan: 6 months - 8/8/2022  Expected length of service: ongoing treatment unless revised  My Physician/PA/NP and I have developed this plan together and I agree to work on the goals and objectives  I understand the treatment goals that were developed for my treatment

## 2022-03-11 ENCOUNTER — APPOINTMENT (OUTPATIENT)
Dept: RADIOLOGY | Facility: CLINIC | Age: 62
End: 2022-03-11
Payer: COMMERCIAL

## 2022-03-11 ENCOUNTER — OFFICE VISIT (OUTPATIENT)
Dept: URGENT CARE | Facility: CLINIC | Age: 62
End: 2022-03-11
Payer: COMMERCIAL

## 2022-03-11 VITALS
BODY MASS INDEX: 39.44 KG/M2 | WEIGHT: 237 LBS | RESPIRATION RATE: 18 BRPM | SYSTOLIC BLOOD PRESSURE: 122 MMHG | HEART RATE: 60 BPM | DIASTOLIC BLOOD PRESSURE: 58 MMHG | TEMPERATURE: 98.3 F | OXYGEN SATURATION: 96 %

## 2022-03-11 DIAGNOSIS — N30.01 ACUTE CYSTITIS WITH HEMATURIA: Primary | ICD-10-CM

## 2022-03-11 DIAGNOSIS — R30.0 DYSURIA: ICD-10-CM

## 2022-03-11 DIAGNOSIS — M76.62 ACHILLES TENDINITIS OF LEFT LOWER EXTREMITY: ICD-10-CM

## 2022-03-11 LAB
SL AMB  POCT GLUCOSE, UA: ABNORMAL
SL AMB LEUKOCYTE ESTERASE,UA: ABNORMAL
SL AMB POCT BILIRUBIN,UA: ABNORMAL
SL AMB POCT BLOOD,UA: ABNORMAL
SL AMB POCT CLARITY,UA: ABNORMAL
SL AMB POCT COLOR,UA: YELLOW
SL AMB POCT KETONES,UA: ABNORMAL
SL AMB POCT NITRITE,UA: ABNORMAL
SL AMB POCT PH,UA: 5
SL AMB POCT SPECIFIC GRAVITY,UA: 1
SL AMB POCT URINE PROTEIN: ABNORMAL
SL AMB POCT UROBILINOGEN: 0.2

## 2022-03-11 PROCEDURE — 87086 URINE CULTURE/COLONY COUNT: CPT | Performed by: NURSE PRACTITIONER

## 2022-03-11 PROCEDURE — 99213 OFFICE O/P EST LOW 20 MIN: CPT | Performed by: NURSE PRACTITIONER

## 2022-03-11 PROCEDURE — 73650 X-RAY EXAM OF HEEL: CPT

## 2022-03-11 RX ORDER — ROSUVASTATIN CALCIUM 10 MG/1
TABLET, COATED ORAL
COMMUNITY
Start: 2022-01-25

## 2022-03-11 RX ORDER — SOD SULF/POT CHLORIDE/MAG SULF 1.479 G
TABLET ORAL
COMMUNITY
Start: 2022-02-16

## 2022-03-11 RX ORDER — DULAGLUTIDE 0.75 MG/.5ML
INJECTION, SOLUTION SUBCUTANEOUS
COMMUNITY
Start: 2022-02-22

## 2022-03-11 RX ORDER — CEPHALEXIN 500 MG/1
500 CAPSULE ORAL EVERY 12 HOURS SCHEDULED
Qty: 14 CAPSULE | Refills: 0 | Status: SHIPPED | OUTPATIENT
Start: 2022-03-11 | End: 2022-03-18

## 2022-03-11 NOTE — PATIENT INSTRUCTIONS
Take antibiotic as directed  Recommend drinking plenty of fluids including water and cranberry juice  Recommend avoiding caffeine or alcohol  Empty bladder frequently  If you develop any high fever, severe back pain, abdominal pain, nausea, vomiting or any concerning symptoms please return proceed ER  Recommend following up with PCP in 3-5 days      Urinary Tract Infection in Women   WHAT YOU NEED TO KNOW:   A urinary tract infection (UTI) is caused by bacteria that get inside your urinary tract  Most bacteria that enter your urinary tract come out when you urinate  If the bacteria stay in your urinary tract, you may get an infection  Your urinary tract includes your kidneys, ureters, bladder, and urethra  Urine is made in your kidneys, and it flows from the ureters to the bladder  Urine leaves the bladder through the urethra  A UTI is more common in your lower urinary tract, which includes your bladder and urethra  DISCHARGE INSTRUCTIONS:   Return to the emergency department if:   · You are urinating very little or not at all  · You have a high fever with shaking chills  · You have side or back pain that gets worse  Call your doctor if:   · You have a fever  · You do not feel better after 2 days of taking antibiotics  · You are vomiting  · You have questions or concerns about your condition or care  Medicines:   · Antibiotics  help fight a bacterial infection  If you have UTIs often (called recurrent UTIs), you may be given antibiotics to take regularly  You will be given directions for when and how to use antibiotics  The goal is to prevent UTIs but not cause antibiotic resistance by using antibiotics too often  · Medicines  may be given to decrease pain and burning when you urinate  They will also help decrease the feeling that you need to urinate often  These medicines will make your urine orange or red  · Take your medicine as directed    Contact your healthcare provider if you think your medicine is not helping or if you have side effects  Tell him or her if you are allergic to any medicine  Keep a list of the medicines, vitamins, and herbs you take  Include the amounts, and when and why you take them  Bring the list or the pill bottles to follow-up visits  Carry your medicine list with you in case of an emergency  Follow up with your doctor as directed:  Write down your questions so you remember to ask them during your visits  Prevent another UTI:   · Empty your bladder often  Urinate and empty your bladder as soon as you feel the need  Do not hold your urine for long periods of time  · Wipe from front to back after you urinate or have a bowel movement  This will help prevent germs from getting into your urinary tract through your urethra  · Drink liquids as directed  Ask how much liquid to drink each day and which liquids are best for you  You may need to drink more liquids than usual to help flush out the bacteria  Do not drink alcohol, caffeine, or citrus juices  These can irritate your bladder and increase your symptoms  Your healthcare provider may recommend cranberry juice to help prevent a UTI  · Urinate after you have sex  This can help flush out bacteria passed during sex  · Do not douche or use feminine deodorants  These can change the chemical balance in your vagina  · Change sanitary pads or tampons often  This will help prevent germs from getting into your urinary tract  · Talk to your healthcare provider about your birth control method  You may need to change your method if it is increasing your risk for UTIs  · Wear cotton underwear and clothes that are loose  Tight pants and nylon underwear can trap moisture and cause bacteria to grow  · Vaginal estrogen may be recommended  This medicine helps prevent UTIs in women who have gone through menopause or are in pippa-menopause  · Do pelvic muscle exercises often    Pelvic muscle exercises may help you start and stop urinating  Strong pelvic muscles may help you empty your bladder easier  Squeeze these muscles tightly for 5 seconds like you are trying to hold back urine  Then relax for 5 seconds  Gradually work up to squeezing for 10 seconds  Do 3 sets of 15 repetitions a day, or as directed  © Copyright [employer] 2022 Information is for End User's use only and may not be sold, redistributed or otherwise used for commercial purposes  All illustrations and images included in CareNotes® are the copyrighted property of [employer]  or [facility] [place] [facility]   The above information is an  only  It is not intended as medical advice for individual conditions or treatments  Talk to your doctor, nurse or pharmacist before following any medical regimen to see if it is safe and effective for you

## 2022-03-11 NOTE — PROGRESS NOTES
[facility]        NAME: Gisel Potter is a 57 y o  female  : 1960    MRN: [id number]  DATE: 2022  TIME: 2:54 PM    Assessment and Plan   Acute cystitis with hematuria [N30 01]  1  Acute cystitis with hematuria  cephalexin (KEFLEX) 500 mg capsule   2  Dysuria  POCT urine dip auto non-scope    Urine culture         Patient Instructions     Patient Instructions     Take antibiotic as directed  Recommend drinking plenty of fluids including water and cranberry juice  Recommend avoiding caffeine or alcohol  Empty bladder frequently  If you develop any high fever, severe back pain, abdominal pain, nausea, vomiting or any concerning symptoms please return proceed ER  Recommend following up with PCP in 3-5 days      Urinary Tract Infection in Women   WHAT YOU NEED TO KNOW:   A urinary tract infection (UTI) is caused by bacteria that get inside your urinary tract  Most bacteria that enter your urinary tract come out when you urinate  If the bacteria stay in your urinary tract, you may get an infection  Your urinary tract includes your kidneys, ureters, bladder, and urethra  Urine is made in your kidneys, and it flows from the ureters to the bladder  Urine leaves the bladder through the urethra  A UTI is more common in your lower urinary tract, which includes your bladder and urethra  DISCHARGE INSTRUCTIONS:   Return to the emergency department if:   · You are urinating very little or not at all  · You have a high fever with shaking chills  · You have side or back pain that gets worse  Call your doctor if:   · You have a fever  · You do not feel better after 2 days of taking antibiotics  · You are vomiting  · You have questions or concerns about your condition or care  Medicines:   · Antibiotics  help fight a bacterial infection  If you have UTIs often (called recurrent UTIs), you may be given antibiotics to take regularly   You will be given directions for when and how to use antibiotics  The goal is to prevent UTIs but not cause antibiotic resistance by using antibiotics too often  · Medicines  may be given to decrease pain and burning when you urinate  They will also help decrease the feeling that you need to urinate often  These medicines will make your urine orange or red  · Take your medicine as directed  Contact your healthcare provider if you think your medicine is not helping or if you have side effects  Tell him or her if you are allergic to any medicine  Keep a list of the medicines, vitamins, and herbs you take  Include the amounts, and when and why you take them  Bring the list or the pill bottles to follow-up visits  Carry your medicine list with you in case of an emergency  Follow up with your doctor as directed:  Write down your questions so you remember to ask them during your visits  Prevent another UTI:   · Empty your bladder often  Urinate and empty your bladder as soon as you feel the need  Do not hold your urine for long periods of time  · Wipe from front to back after you urinate or have a bowel movement  This will help prevent germs from getting into your urinary tract through your urethra  · Drink liquids as directed  Ask how much liquid to drink each day and which liquids are best for you  You may need to drink more liquids than usual to help flush out the bacteria  Do not drink alcohol, caffeine, or citrus juices  These can irritate your bladder and increase your symptoms  Your healthcare provider may recommend cranberry juice to help prevent a UTI  · Urinate after you have sex  This can help flush out bacteria passed during sex  · Do not douche or use feminine deodorants  These can change the chemical balance in your vagina  · Change sanitary pads or tampons often  This will help prevent germs from getting into your urinary tract  · Talk to your healthcare provider about your birth control method    You may need to change your method if it is increasing your risk for UTIs  · Wear cotton underwear and clothes that are loose  Tight pants and nylon underwear can trap moisture and cause bacteria to grow  · Vaginal estrogen may be recommended  This medicine helps prevent UTIs in women who have gone through menopause or are in pippa-menopause  · Do pelvic muscle exercises often  Pelvic muscle exercises may help you start and stop urinating  Strong pelvic muscles may help you empty your bladder easier  Squeeze these muscles tightly for 5 seconds like you are trying to hold back urine  Then relax for 5 seconds  Gradually work up to squeezing for 10 seconds  Do 3 sets of 15 repetitions a day, or as directed  © Copyright [employer] 2022 Information is for End User's use only and may not be sold, redistributed or otherwise used for commercial purposes  All illustrations and images included in CareNotes® are the copyrighted property of A D A M , Inc  or [facility]   The above information is an  only  It is not intended as medical advice for individual conditions or treatments  Talk to your doctor, nurse or pharmacist before following any medical regimen to see if it is safe and effective for you  Follow up with PCP in 3-5 days  Proceed to  ER if symptoms worsen  Chief Complaint     Chief Complaint   Patient presents with    Possible UTI     x 3 days         History of Present Illness       Urinary Tract Infection   This is a new problem  Episode onset: 3 days ago  The problem occurs every urination  The problem has been gradually worsening  The quality of the pain is described as aching  The pain is mild  There has been no fever  There is no history of pyelonephritis  Associated symptoms include frequency and urgency  Pertinent negatives include no chills, discharge, flank pain, hematuria, hesitancy, nausea, sweats or vomiting  She has tried nothing for the symptoms   There is no history of recurrent UTIs  Review of Systems   Review of Systems   Constitutional: Negative for chills, diaphoresis, fatigue and fever  Respiratory: Negative for cough, chest tightness and shortness of breath  Cardiovascular: Negative for chest pain  Gastrointestinal: Negative for abdominal pain, diarrhea, nausea and vomiting  Genitourinary: Positive for dysuria, frequency and urgency  Negative for decreased urine volume, difficulty urinating, flank pain, hematuria, hesitancy, pelvic pain, vaginal bleeding, vaginal discharge and vaginal pain  Musculoskeletal: Negative for back pain, joint swelling, myalgias, neck pain and neck stiffness  Skin: Negative for rash  Neurological: Negative for dizziness, weakness, numbness and headaches           Current Medications       Current Outpatient Medications:     acetaminophen (TYLENOL) 500 mg tablet, Take 500 mg by mouth every 6 (six) hours as needed for mild pain, Disp: , Rfl:     buPROPion (Wellbutrin SR) 100 mg 12 hr tablet, 1 Tab Breakfast, lunch and dinner, Disp: 270 tablet, Rfl: 1    clonazePAM (KlonoPIN) 0 5 mg tablet, 1 in AM and 1 in afternoon, Disp: 180 tablet, Rfl: 1    Diclofenac Sodium (VOLTAREN) 1 %, apply 4 grams every 6 hours if needed for pain IN THE LEFT HEEL, Disp: , Rfl:     gabapentin (NEURONTIN) 300 mg capsule, 1 in AM and 2 HS, Disp: 270 capsule, Rfl: 2    Multiple Vitamin (MULTIVITAMIN) capsule, Take 1 capsule by mouth daily, Disp: , Rfl:     oxybutynin (DITROPAN-XL) 10 MG 24 hr tablet, Take 10 mg by mouth daily at bedtime, Disp: , Rfl:     rosuvastatin (CRESTOR) 10 MG tablet, , Disp: , Rfl:     Sutab 6075-579-271 MG TABS, Take by mouth Take as directed, Disp: , Rfl:     traZODone (DESYREL) 100 mg tablet, 1/2HS, Disp: 180 tablet, Rfl: 3    Trulicity 2 43 LX/7 8AK SOPN, inject 0 5 milliliters ( 0 75 milligrams ) subcutaneously every week, Disp: , Rfl:     cephalexin (KEFLEX) 500 mg capsule, Take 1 capsule (500 mg total) by mouth every 12 (twelve) hours for 7 days, Disp: 14 capsule, Rfl: 0    Current Allergies     Allergies as of 03/11/2022 - Reviewed 03/11/2022   Allergen Reaction Noted    Banana - food allergy Hives 09/27/2018    Crab (diagnostic) - food allergy Hives 09/10/2018    Cleocin [clindamycin]  09/27/2018    Latex  09/27/2018    Penicillins Other (See Comments) 07/11/2018            The following portions of the patient's history were reviewed and updated as appropriate: allergies, current medications, past family history, past medical history, past social history, past surgical history and problem list      Past Medical History:   Diagnosis Date    Anxiety     Arthritis     Back pain     Lower back    Bipolar 1 disorder ([address] )     Depression     Diverticulosis     Hip dysplasia     Bilateral    Hyperlipidemia     Knee pain, right     Obesity     Psoriasis     Along hairline   Stress incontinence        Past Surgical History:   Procedure Laterality Date    COLONOSCOPY      DILATION AND CURETTAGE OF UTERUS      One D&C and one D&E    ESOPHAGOGASTRODUODENOSCOPY      NASAL SEPTUM SURGERY      ME KNEE SCOPE,MED/LAT MENISECTOMY Right 9/27/2018    Procedure: ARTHROSCOPY KNEE; PARTIAL MEDIAL MENISECTOMY; CHONDROPLASTY;  Surgeon: Regan Clemens DO;  Location: [facility];  Service: Orthopedics    TONSILECTOMY AND ADNOIDECTOMY      TONSILLECTOMY         Family History   Problem Relation Age of Onset    Asthma Mother     No Known Problems Father     Rectal cancer Sister 62    No Known Problems Daughter     No Known Problems Maternal Grandmother     No Known Problems Maternal Grandfather     No Known Problems Paternal Grandmother     No Known Problems Paternal Grandfather     No Known Problems Paternal Aunt          Medications have been verified          Objective   /58   Pulse 60   Temp 98 3 °F (36 8 °C)   Resp 18   Wt 108 kg (237 lb)   SpO2 96%   BMI 39 44 kg/m²   No LMP recorded  Patient is postmenopausal        Physical Exam     Physical Exam  Constitutional:       General: She is not in acute distress  Appearance: Normal appearance  She is well-developed  She is not diaphoretic  Cardiovascular:      Rate and Rhythm: Normal rate and regular rhythm  Heart sounds: Normal heart sounds  Pulmonary:      Effort: Pulmonary effort is normal       Breath sounds: Normal breath sounds  Abdominal:      General: Bowel sounds are normal  There is no distension  Palpations: Abdomen is soft  Abdomen is not rigid  There is no mass  Tenderness: There is no abdominal tenderness  There is no right CVA tenderness, left CVA tenderness, guarding or rebound  Negative signs include Valera's sign and McBurney's sign  Skin:     General: Skin is warm and dry  Neurological:      Mental Status: She is alert and oriented to person, place, and time

## 2022-03-12 LAB — BACTERIA UR CULT: NORMAL

## 2022-03-19 ENCOUNTER — APPOINTMENT (OUTPATIENT)
Dept: LAB | Facility: CLINIC | Age: 62
End: 2022-03-19
Payer: COMMERCIAL

## 2022-03-19 DIAGNOSIS — E61.1 IRON DEFICIENCY: ICD-10-CM

## 2022-03-19 DIAGNOSIS — E13.69 OTHER SPECIFIED DIABETES MELLITUS WITH OTHER SPECIFIED COMPLICATION, UNSPECIFIED WHETHER LONG TERM INSULIN USE (HCC): ICD-10-CM

## 2022-03-19 LAB
ALBUMIN SERPL BCP-MCNC: 4.4 G/DL (ref 3.5–5)
ALP SERPL-CCNC: 87 U/L (ref 46–116)
ALT SERPL W P-5'-P-CCNC: 40 U/L (ref 12–78)
ANION GAP SERPL CALCULATED.3IONS-SCNC: 5 MMOL/L (ref 4–13)
AST SERPL W P-5'-P-CCNC: 29 U/L (ref 5–45)
BILIRUB SERPL-MCNC: 0.83 MG/DL (ref 0.2–1)
BUN SERPL-MCNC: 14 MG/DL (ref 5–25)
CALCIUM SERPL-MCNC: 10.2 MG/DL (ref 8.3–10.1)
CHLORIDE SERPL-SCNC: 109 MMOL/L (ref 100–108)
CHOLEST SERPL-MCNC: 118 MG/DL
CO2 SERPL-SCNC: 26 MMOL/L (ref 21–32)
CREAT SERPL-MCNC: 1.11 MG/DL (ref 0.6–1.3)
ERYTHROCYTE [DISTWIDTH] IN BLOOD BY AUTOMATED COUNT: 16 % (ref 11.6–15.1)
EST. AVERAGE GLUCOSE BLD GHB EST-MCNC: 111 MG/DL
FERRITIN SERPL-MCNC: 11 NG/ML (ref 8–388)
GFR SERPL CREATININE-BSD FRML MDRD: 53 ML/MIN/1.73SQ M
GLUCOSE P FAST SERPL-MCNC: 107 MG/DL (ref 65–99)
HBA1C MFR BLD: 5.5 %
HCT VFR BLD AUTO: 44 % (ref 34.8–46.1)
HDLC SERPL-MCNC: 38 MG/DL
HGB BLD-MCNC: 13.5 G/DL (ref 11.5–15.4)
IRON SATN MFR SERPL: 14 % (ref 15–50)
IRON SERPL-MCNC: 55 UG/DL (ref 50–170)
LDLC SERPL CALC-MCNC: 55 MG/DL (ref 0–100)
MCH RBC QN AUTO: 25 PG (ref 26.8–34.3)
MCHC RBC AUTO-ENTMCNC: 30.7 G/DL (ref 31.4–37.4)
MCV RBC AUTO: 82 FL (ref 82–98)
NONHDLC SERPL-MCNC: 80 MG/DL
PLATELET # BLD AUTO: 181 THOUSANDS/UL (ref 149–390)
PMV BLD AUTO: 12.8 FL (ref 8.9–12.7)
POTASSIUM SERPL-SCNC: 4.5 MMOL/L (ref 3.5–5.3)
PROT SERPL-MCNC: 7.8 G/DL (ref 6.4–8.2)
RBC # BLD AUTO: 5.4 MILLION/UL (ref 3.81–5.12)
SODIUM SERPL-SCNC: 140 MMOL/L (ref 136–145)
TIBC SERPL-MCNC: 396 UG/DL (ref 250–450)
TRIGL SERPL-MCNC: 124 MG/DL
WBC # BLD AUTO: 5.19 THOUSAND/UL (ref 4.31–10.16)

## 2022-03-19 PROCEDURE — 83540 ASSAY OF IRON: CPT

## 2022-03-19 PROCEDURE — 83550 IRON BINDING TEST: CPT

## 2022-03-19 PROCEDURE — 85027 COMPLETE CBC AUTOMATED: CPT

## 2022-03-19 PROCEDURE — 83036 HEMOGLOBIN GLYCOSYLATED A1C: CPT

## 2022-03-19 PROCEDURE — 80053 COMPREHEN METABOLIC PANEL: CPT

## 2022-03-19 PROCEDURE — 82728 ASSAY OF FERRITIN: CPT

## 2022-03-19 PROCEDURE — 36415 COLL VENOUS BLD VENIPUNCTURE: CPT

## 2022-03-19 PROCEDURE — 80061 LIPID PANEL: CPT

## 2022-03-22 ENCOUNTER — OFFICE VISIT (OUTPATIENT)
Dept: DIABETES SERVICES | Facility: CLINIC | Age: 62
End: 2022-03-22
Payer: COMMERCIAL

## 2022-03-22 VITALS — WEIGHT: 220 LBS | BODY MASS INDEX: 36.61 KG/M2

## 2022-03-22 DIAGNOSIS — E11.9 TYPE 2 DIABETES MELLITUS WITHOUT COMPLICATION, WITHOUT LONG-TERM CURRENT USE OF INSULIN (HCC): Primary | ICD-10-CM

## 2022-03-22 PROCEDURE — 97802 MEDICAL NUTRITION INDIV IN: CPT | Performed by: DIETITIAN, REGISTERED

## 2022-03-22 NOTE — PROGRESS NOTES
Medical Nutrition Therapy      Assessment    Chief complaint [address] reports having an EGD 2 weeks ago that revealed food remaining in her stomach after a 13 hour fast, she has no further work-up scheduled but there is a concern for gastroparesis  She also reports her  thinks she is losing weight too fast  There may be a discrepancy between scales or how much Juliette Pulido had on at 3/11 appointment, today's weight is 17# lower  Visit Type: Follow-up visit    HPI: Enriqeu Calhoun returned for follow-up today  Christiano Swan food recall reveals 3 meals and evening snack only  She is eating smaller portions and choosing very little sugary foods  She does drink a lot of diet green tea  Overall, [facility] meals provide 25-60 grams carbohydrate  Based on meal plan review and recommendation from her doctor about eating 4-5 small meals provided education about how Trulicity effect gastric motility, mid-afternoon snack ideas, consistent carbohydrate, increased fiber intake, and drinking more water and less artificial sweetener       Ht Readings from Last 1 Encounters:   11/18/21 5' 5" (1 651 m)     Wt Readings from Last 2 Encounters:   03/22/22 99 8 kg (220 lb)   03/11/22 108 kg (237 lb)     Weight Change: Yes -25# since diagnosis    Medical Diagnosis/reason for visit E11 9    Food Log: Completed via the method of food recall     Breakfast: 8:30-9 am, 2 pieces raisin toast w/ a little butter/canola oil blend OR Special K w/ protein or Puffed wheat or Wheat chex w/ Skim milk OR ww Eggo waffle w/ no sugar syrup, more milk to drink  Morning Snack:nothing  Lunch:12:30-1, string cheese OR a yogurt Greek Dannon Light and Fit, triscuit or wheat thin crackers, pear fruit cup  Afternoon Snack: none since diagnosis  Dinner: 5-6, leftover grilled chicken panini - grilled chicken, onions, tomato, lettuce OR 1 cup noodles, sauce, 1 meatball, 1 piece of garlic bread  Evening Snack:sugar free single stick popsicle "Outshine", later has sugar free wafer cookies or a little ice cream OR Splenda sugar free supplement (15 g carb, 15 g protein)    Beverages: diet green tea, water (more tea than water some days)  Eating out/Take out:2-3 times per month    Exercise not using stationary bike as much as she could  Can do more walking, currently only walking the dog  Calorie needs 1370 kcals/day Carbs: 30-45g/meal, 15g/snack       Nutrition Diagnosis:  Inconsistent carbohydrate intake  intake related to Physiological causes requiring careful timing and consistency in the amount of carbohydrate (i e  diabetes mellitus, hypoglycemia) as evidenced by  Estimated carbohydrate intake that is different from recommended types or ingested on an irregular basis    Intervention: carbohydrate counting, increased plant based foods, meal planning, monitoring portion control and exercise guidelines     Treatment Goals: Patient understands education and recommendations, Patient will consume snacks, Patient will count carbohydrates and Patient will exercise    Monitoring and evaluation:    Term code indicator  FH 1 3 2 Food Intake Criteria: Consume 3 meals and 2 snacks daily  Term code indicator  FH 1 6 3 Carbohydrate Intake Criteria: 30-45 per meal, 15 per snack  Term code indicator  CH 2 2 Treatments/Therapy/Alternative Medicine Criteria: Walk for 10 minutes daily, apart from walking the dog    Patients Response to Instruction:  Comprehensionvery good  Motivationvery good  Expected Compliancevery good    Thank you for coming to the [facility] for education today  Please feel free to call with any questions or concerns      Krupa Shanks  [address] [place] [id number] [phone]

## 2022-03-23 ENCOUNTER — APPOINTMENT (OUTPATIENT)
Dept: LAB | Facility: CLINIC | Age: 62
End: 2022-03-23
Payer: COMMERCIAL

## 2022-03-23 DIAGNOSIS — Z13.9 SCREENING FOR UNSPECIFIED CONDITION: ICD-10-CM

## 2022-03-23 DIAGNOSIS — E03.9 HYPOTHYROIDISM, UNSPECIFIED TYPE: ICD-10-CM

## 2022-03-23 LAB
TSH SERPL DL<=0.05 MIU/L-ACNC: 0.86 UIU/ML (ref 0.36–3.74)
VIT B12 SERPL-MCNC: 197 PG/ML (ref 100–900)

## 2022-03-23 PROCEDURE — 36415 COLL VENOUS BLD VENIPUNCTURE: CPT

## 2022-03-23 PROCEDURE — 86376 MICROSOMAL ANTIBODY EACH: CPT

## 2022-03-23 PROCEDURE — 84443 ASSAY THYROID STIM HORMONE: CPT

## 2022-03-23 PROCEDURE — 86800 THYROGLOBULIN ANTIBODY: CPT

## 2022-03-23 PROCEDURE — 82607 VITAMIN B-12: CPT

## 2022-03-24 LAB
THYROGLOB AB SERPL-ACNC: <1 IU/ML (ref 0–0.9)
THYROPEROXIDASE AB SERPL-ACNC: 10 IU/ML (ref 0–34)

## 2022-04-04 ENCOUNTER — OFFICE VISIT (OUTPATIENT)
Dept: PSYCHIATRY | Facility: CLINIC | Age: 62
End: 2022-04-04
Payer: COMMERCIAL

## 2022-04-04 DIAGNOSIS — F31.62 BIPOLAR MIXED AFFECTIVE DISORDER, MODERATE (HCC): Primary | ICD-10-CM

## 2022-04-04 PROCEDURE — 99213 OFFICE O/P EST LOW 20 MIN: CPT | Performed by: NURSE PRACTITIONER

## 2022-04-04 NOTE — PSYCH
PROGRESS NOTE        [place]      Name and Date of Birth:  Carlos Miller 57 y o  1960    Date of Visit: 04/04/22    SUBJECTIVE:  Patient is a 74-year-old female who suffers from bipolar depressive disorder  She is doing well on this current medication regimen  She is reporting no new clinical issues or concerns  Sleep is good as well as appetite  She has energy interest and motivation  She will continue on this current ready medication regimen and follow-up will be in 3 months  She denies suicidal ideation, intent or plan at present, has no suicidal ideation, intent or plan at present  She denies any auditory hallucinations and visual hallucinations, denies any other delusional thinking, denies any delusional thinking  She denies any side effects from medications    HPI ROS Appetite Changes and Sleep: normal appetite, normal sleep    Review Of Systems:      Constitutional Negative   ENT Negative   Cardiovascular Negative   Respiratory As Noted in HPI   Gastrointestinal Negative   Genitourinary Negative   Musculoskeletal Negative   Integumentary Negative   Neurological Negative   Endocrine Negative   Other Symptoms Negative and None       Laboratory Results: No results found for this or any previous visit      Substance Abuse History:    Social History     Substance and Sexual Activity   Drug Use No       Family Psychiatric History:     Family History   Problem Relation Age of Onset    Asthma Mother     No Known Problems Father     Rectal cancer Sister 62    No Known Problems Daughter     No Known Problems Maternal Grandmother     No Known Problems Maternal Grandfather     No Known Problems Paternal Grandmother     No Known Problems Paternal Grandfather     No Known Problems Paternal Aunt        The following portions of the patient's history were reviewed and updated as appropriate: past family history, past medical history, past social history, past surgical history and problem list     Social History     Socioeconomic History    Marital status: /Civil Union     Spouse name: Not on file    Number of children: Not on file    Years of education: Not on file    Highest education level: Not on file   Occupational History    Not on file   Tobacco Use    Smoking status: Never Smoker    Smokeless tobacco: Never Used   Vaping Use    Vaping Use: Never used   Substance and Sexual Activity    Alcohol use: Yes     Comment: rare    Drug use: No    Sexual activity: Not on file   Other Topics Concern    Not on file   Social History Narrative    Not on file     Social Determinants of Health     Financial Resource Strain: Not on file   Food Insecurity: Not on file   Transportation Needs: Not on file   Physical Activity: Not on file   Stress: Not on file   Social Connections: Not on file   Intimate Partner Violence: Not on file   Housing Stability: Not on file     Social History     Social History Narrative    Not on file        Social History     Tobacco History     Smoking Status  Never Smoker    Smokeless Tobacco Use  Never Used          Alcohol History     Alcohol Use Status  Yes Comment  rare          Drug Use     Drug Use Status  No          Sexual Activity     Sexually Active  Not Asked          Activities of Daily Living    Not Asked                     OBJECTIVE:     Mental Status Evaluation:    Appearance age appropriate, casually dressed   Behavior pleasant, cooperative   Speech normal volume, normal pitch   Mood Improved   Affect brighter   Thought Processes logical   Associations intact associations   Thought Content Normal   Perceptual Disturbances: None   Abnormal Thoughts  Risk Potential Suicidal ideation - None  Homicidal ideation - None  Potential for aggression - No   Orientation oriented to person, place, time/date and situation   Memory recent and remote memory grossly intact   Cosciousness alert and awake Attention Span attention span and concentration are age appropriate   Intellect Appears to be of Average Intelligence   Insight Good   Judgement Good   Muscle Strength and  Gait muscle strength and tone were normal   Language no difficulty naming common objects   Fund of Knowledge displays adequate knowledge of current events   Pain None   Pain Scale 0       Assessment/Plan:       Diagnoses and all orders for this visit:    Bipolar mixed affective disorder, moderate ([facility] [address] )          Treatment Recommendations/Precautions:    Continue current medications: Wellbutrin  mg t i d    Klonopin 0 5 mg b i d    Gabapentin 300 mg, 1 in the morning and 2 at bedtime  Trazodone  mg HS  Follow-up in 3 months or sooner if necessary  Risks/Benefits      Risks, Benefits And Possible Side Effects Of Medications:    Risks, benefits, and possible side effects of medications explained to patient and patient verbalizes understanding and agreement for treatment      Controlled Medication Discussion:     Not applicable    Psychotherapy Provided:     Individual psychotherapy provided: No

## 2022-04-27 ENCOUNTER — APPOINTMENT (OUTPATIENT)
Dept: LAB | Facility: CLINIC | Age: 62
End: 2022-04-27
Payer: COMMERCIAL

## 2022-04-27 DIAGNOSIS — E11.9 TYPE 2 DIABETES MELLITUS WITHOUT COMPLICATION, UNSPECIFIED WHETHER LONG TERM INSULIN USE (HCC): ICD-10-CM

## 2022-04-27 LAB
ALBUMIN SERPL BCP-MCNC: 4 G/DL (ref 3.5–5)
ALP SERPL-CCNC: 90 U/L (ref 46–116)
ALT SERPL W P-5'-P-CCNC: 33 U/L (ref 12–78)
ANION GAP SERPL CALCULATED.3IONS-SCNC: 3 MMOL/L (ref 4–13)
AST SERPL W P-5'-P-CCNC: 19 U/L (ref 5–45)
BILIRUB SERPL-MCNC: 0.63 MG/DL (ref 0.2–1)
BUN SERPL-MCNC: 13 MG/DL (ref 5–25)
CALCIUM SERPL-MCNC: 10.1 MG/DL (ref 8.3–10.1)
CHLORIDE SERPL-SCNC: 109 MMOL/L (ref 100–108)
CHOLEST SERPL-MCNC: 112 MG/DL
CO2 SERPL-SCNC: 28 MMOL/L (ref 21–32)
CREAT SERPL-MCNC: 1.09 MG/DL (ref 0.6–1.3)
ERYTHROCYTE [DISTWIDTH] IN BLOOD BY AUTOMATED COUNT: 15.7 % (ref 11.6–15.1)
EST. AVERAGE GLUCOSE BLD GHB EST-MCNC: 114 MG/DL
GFR SERPL CREATININE-BSD FRML MDRD: 54 ML/MIN/1.73SQ M
GLUCOSE P FAST SERPL-MCNC: 95 MG/DL (ref 65–99)
HBA1C MFR BLD: 5.6 %
HCT VFR BLD AUTO: 43.8 % (ref 34.8–46.1)
HDLC SERPL-MCNC: 36 MG/DL
HGB BLD-MCNC: 13.5 G/DL (ref 11.5–15.4)
LDLC SERPL CALC-MCNC: 44 MG/DL (ref 0–100)
MCH RBC QN AUTO: 25 PG (ref 26.8–34.3)
MCHC RBC AUTO-ENTMCNC: 30.8 G/DL (ref 31.4–37.4)
MCV RBC AUTO: 81 FL (ref 82–98)
NONHDLC SERPL-MCNC: 76 MG/DL
PLATELET # BLD AUTO: 155 THOUSANDS/UL (ref 149–390)
PMV BLD AUTO: 12.7 FL (ref 8.9–12.7)
POTASSIUM SERPL-SCNC: 4 MMOL/L (ref 3.5–5.3)
PROT SERPL-MCNC: 7.7 G/DL (ref 6.4–8.2)
RBC # BLD AUTO: 5.41 MILLION/UL (ref 3.81–5.12)
SODIUM SERPL-SCNC: 140 MMOL/L (ref 136–145)
TRIGL SERPL-MCNC: 161 MG/DL
WBC # BLD AUTO: 5.69 THOUSAND/UL (ref 4.31–10.16)

## 2022-04-27 PROCEDURE — 83036 HEMOGLOBIN GLYCOSYLATED A1C: CPT

## 2022-04-27 PROCEDURE — 36415 COLL VENOUS BLD VENIPUNCTURE: CPT

## 2022-04-27 PROCEDURE — 85027 COMPLETE CBC AUTOMATED: CPT

## 2022-04-27 PROCEDURE — 80053 COMPREHEN METABOLIC PANEL: CPT

## 2022-04-27 PROCEDURE — 80061 LIPID PANEL: CPT

## 2022-05-04 ENCOUNTER — APPOINTMENT (OUTPATIENT)
Dept: LAB | Facility: CLINIC | Age: 62
End: 2022-05-04
Payer: COMMERCIAL

## 2022-05-04 DIAGNOSIS — E55.9 VITAMIN D DEFICIENCY: ICD-10-CM

## 2022-05-04 LAB — 25(OH)D3 SERPL-MCNC: 57 NG/ML (ref 30–100)

## 2022-05-04 PROCEDURE — 36415 COLL VENOUS BLD VENIPUNCTURE: CPT

## 2022-05-04 PROCEDURE — 82306 VITAMIN D 25 HYDROXY: CPT

## 2022-05-12 ENCOUNTER — TELEPHONE (OUTPATIENT)
Dept: PSYCHIATRY | Facility: CLINIC | Age: 62
End: 2022-05-12

## 2022-05-12 NOTE — TELEPHONE ENCOUNTER
Placed pt on the wait list for talk therapy no referral in system  She is a current patient  She said she is open to vv  What ever is the fastest  She stated she felt like she was falling apart  I let her know the best thing to do was go to the er if she was in crisis   I let her know to get a referral from her

## 2022-05-23 ENCOUNTER — TELEPHONE (OUTPATIENT)
Dept: PSYCHIATRY | Facility: CLINIC | Age: 62
End: 2022-05-23

## 2022-05-23 DIAGNOSIS — F31.9 BIPOLAR I DISORDER WITH DEPRESSION (HCC): ICD-10-CM

## 2022-05-23 DIAGNOSIS — F31.9 BIPOLAR DISORDER WITH DEPRESSION (HCC): ICD-10-CM

## 2022-05-23 RX ORDER — GABAPENTIN 400 MG/1
400 CAPSULE ORAL 3 TIMES DAILY
Qty: 90 CAPSULE | Refills: 0 | Status: SHIPPED | OUTPATIENT
Start: 2022-05-23 | End: 2022-06-22

## 2022-05-23 NOTE — TELEPHONE ENCOUNTER
Returned Evelia's call  Kia Reich states that she has been having a lot of depression, anxiety and rage lately  States there have been numerous triggers  Found out her daughter in law had Covid and grandson is now being tested for it also  States that things are really getting to her  Things going on in the world, politics and the news are causing her a lot of depression and anxiety  States that Dawn Flannery previously mentioned increasing her Gabapentin and she is wondering if this can be done  She is aware Dawn Flanenry is on vacation  Would also like to see about getting in sooner to see Dawn Flannery as her appointment is not until July  Will refer to Dr Delano Eng for review

## 2022-05-23 NOTE — TELEPHONE ENCOUNTER
Marlon Carney sees Shahriar Storey, she called to speak to nurse or covering provider because she has been anxious, and rage  So she has been taking klonopin 3 times a day which has helped somewhat  Mariam Puente mentioned previously about Maybe increasing gabapentin if this occurred

## 2022-05-23 NOTE — TELEPHONE ENCOUNTER
Called Evelia to discuss increase in Gabapentin  Aneudy Castellanos is willing to try the 400 MG TID  She is currently taking Klonopin TID but I informed her Dr Leslie Tai would like her not to exceed taking it twice a day  Aneudy Castellanos is requesting that Gabapentin be sent to [facility] instead of mail order  Will refer to Dr Leslie Tai for script

## 2022-06-06 NOTE — TELEPHONE ENCOUNTER
Spoke with patient  Patient is doing better on 1200 mg of gabapentin per day  She is also taking clonazepam 0 5 mg b i d  and not t i d   For now, she is feeling well with no new complaints  Will follow-up at her next appointment

## 2022-06-08 ENCOUNTER — OFFICE VISIT (OUTPATIENT)
Dept: DIABETES SERVICES | Facility: CLINIC | Age: 62
End: 2022-06-08
Payer: COMMERCIAL

## 2022-06-08 VITALS — BODY MASS INDEX: 35.28 KG/M2 | WEIGHT: 212 LBS

## 2022-06-08 DIAGNOSIS — E11.9 TYPE 2 DIABETES MELLITUS WITHOUT COMPLICATION, WITHOUT LONG-TERM CURRENT USE OF INSULIN (HCC): Primary | ICD-10-CM

## 2022-06-08 PROCEDURE — 97803 MED NUTRITION INDIV SUBSEQ: CPT | Performed by: DIETITIAN, REGISTERED

## 2022-06-08 NOTE — PROGRESS NOTES
Medical Nutrition Therapy      Assessment    Chief complaint Constipation  Frank Reyes doesn't c/o gastroparesis symptoms at all but reports needing miralax and metamucil and still only moving bowels every 3 days    Visit Type: Follow-up visit    HPI: Galo Novak returned for follow-up today  Walter Jara food recall reveals 3 meals and 2 snacks  Most meals provide 20-30 g carb, but she does have around 45-50 at breakfast  All of her random BG tests are   Overall, Walter Jara diet seems to meet her needs and facilitate continued gradual weight loss  Based on meal plan review and discussion provided education about managing constipation, exercise benefits, and A1c goals  [employer]      Ht Readings from Last 1 Encounters:   11/18/21 5' 5" (1 651 m)     Wt Readings from Last 2 Encounters:   06/08/22 96 2 kg (212 lb)   03/22/22 99 8 kg (220 lb)     Weight Change: Yes -8# since March    Medical Diagnosis/reason for visit E11 9    Food Log: Completed via the method of food recall     Breakfast: 8 am, 2 ww waffles w/ sugar free syrup, 1 cup skim milk, 1/2 piece of cupcake  Morning Snack:none  Lunch:12:30-1, light yogurt and bran buds, sliced apple or a few grapes  Afternoon Snack: may have fruit if didn't have it with lunch OR a few "veggie" chips  Dinner:1/2 can of bean soup (mouth hurt from dental work), few oyster crackers, OR a few red potato pieces, sliced pork, broccoli, very little butter w/ canola oil OR chicken fingers w/ veggies  Evening Snack:a couple sugar free wafer cookies and milk, metamucil w/ miralax    Beverages: milk, water, occasional diet iced tea  Eating out/Take out:occasionally, somewhere like [employer]    Exercise only walking the dog, can try to do a little more    Calorie needs 1350kcals/day Carbs: 30-45g/meal, 15g/snack     Nutrition Diagnosis:  Physical inactivity  related to Lack of value for behavior or competing values as evidenced by Infrequent, low duration and/or low intensity physical activity    Intervention: behavior modification strategies, carbohydrate counting and exercise guidelines     Treatment Goals: Patient understands education and recommendations, Patient will count carbohydrates and Patient will exercise    Monitoring and evaluation:    Term code indicator  FH 1 6 3 Carbohydrate Intake Criteria: 30-45 g per meal  Term code indicator  CH 2 2 Treatments/Therapy/Alternative Medicine Criteria: Add physical activity 4-5 days per week  Alternate walking after meals and seated strength training    Patients Response to Instruction:  Comprehensionvery good  Motivationvery good  Expected Compliancevery good    Thank you for coming to the [facility] for education today  Please feel free to call with any questions or concerns      [address] [id number] [phone]

## 2022-06-08 NOTE — PATIENT INSTRUCTIONS
Keep aiming for 30-45 grams of carb per meal  Add exercise, nightly walks, upper body exercise a few times a week  Keep up with the metamucil and intentional fiber intake

## 2022-06-28 ENCOUNTER — OFFICE VISIT (OUTPATIENT)
Dept: PSYCHIATRY | Facility: CLINIC | Age: 62
End: 2022-06-28
Payer: COMMERCIAL

## 2022-06-28 DIAGNOSIS — F33.1 MODERATE EPISODE OF RECURRENT MAJOR DEPRESSIVE DISORDER (HCC): ICD-10-CM

## 2022-06-28 DIAGNOSIS — F31.9 BIPOLAR DEPRESSION (HCC): Primary | ICD-10-CM

## 2022-06-28 DIAGNOSIS — F31.9 BIPOLAR I DISORDER WITH DEPRESSION (HCC): ICD-10-CM

## 2022-06-28 PROCEDURE — 99214 OFFICE O/P EST MOD 30 MIN: CPT | Performed by: NURSE PRACTITIONER

## 2022-06-28 RX ORDER — TRAZODONE HYDROCHLORIDE 100 MG/1
TABLET ORAL
Qty: 180 TABLET | Refills: 3 | Status: SHIPPED | OUTPATIENT
Start: 2022-06-28

## 2022-06-28 RX ORDER — ESCITALOPRAM OXALATE 5 MG/1
5 TABLET ORAL DAILY
Qty: 30 TABLET | Refills: 1 | Status: SHIPPED | OUTPATIENT
Start: 2022-06-28

## 2022-06-28 RX ORDER — CLONAZEPAM 0.5 MG/1
TABLET ORAL
Qty: 270 TABLET | Refills: 1 | Status: SHIPPED | OUTPATIENT
Start: 2022-06-28

## 2022-06-28 NOTE — PSYCH
PROGRESS NOTE        [facility]      Name and Date of Birth:  John Cordoba 60 y o  1960    Date of Visit: 06/28/22    SUBJECTIVE:  Patient is a 58-year-old female who has a history of bipolar depressive disorder  Recently, she had an increase of gabapentin which has been successful in reducing her mood fluctuations where she was very irritable and agitated to now where she is somewhat depressed  She is crying very easily and not sure while the time  I reviewed her meds and we are going to start her on a very low-dose of Lexapro 5 mg daily and continue the Wellbutrin at 300mg a day  We will continue the gabapentin is is and she will follow-up with me in 4-6 weeks or sooner if necessary  She denies suicidal ideation, intent or plan at present, has no suicidal ideation, intent or plan at present  She denies any auditory hallucinations and visual hallucinations, denies any other delusional thinking, denies any delusional thinking  She denies any side effects from medications    HPI ROS Appetite Changes and Sleep: normal appetite, normal sleep    Review Of Systems:      Constitutional Negative   ENT Negative   Cardiovascular Negative   Respiratory As Noted in HPI   Gastrointestinal Negative   Genitourinary Negative   Musculoskeletal Negative   Integumentary Negative   Neurological Negative   Endocrine Negative   Other Symptoms Negative and None       Laboratory Results: No results found for this or any previous visit      Substance Abuse History:    Social History     Substance and Sexual Activity   Drug Use No       Family Psychiatric History:     Family History   Problem Relation Age of Onset    Asthma Mother     No Known Problems Father     Rectal cancer Sister 62    No Known Problems Daughter     No Known Problems Maternal Grandmother     No Known Problems Maternal Grandfather     No Known Problems Paternal Grandmother     No Known Problems Paternal Grandfather     No Known Problems Paternal Aunt        The following portions of the patient's history were reviewed and updated as appropriate: past family history, past medical history, past social history, past surgical history and problem list     Social History     Socioeconomic History    Marital status: /Civil Union     Spouse name: Not on file    Number of children: Not on file    Years of education: Not on file    Highest education level: Not on file   Occupational History    Not on file   Tobacco Use    Smoking status: Never Smoker    Smokeless tobacco: Never Used   Vaping Use    Vaping Use: Never used   Substance and Sexual Activity    Alcohol use: Yes     Comment: rare    Drug use: No    Sexual activity: Not on file   Other Topics Concern    Not on file   Social History Narrative    Not on file     Social Determinants of Health     Financial Resource Strain: Not on file   Food Insecurity: Not on file   Transportation Needs: Not on file   Physical Activity: Not on file   Stress: Not on file   Social Connections: Not on file   Intimate Partner Violence: Not on file   Housing Stability: Not on file     Social History     Social History Narrative    Not on file        Social History     Tobacco History     Smoking Status  Never Smoker    Smokeless Tobacco Use  Never Used          Alcohol History     Alcohol Use Status  Yes Comment  rare          Drug Use     Drug Use Status  No          Sexual Activity     Sexually Active  Not Asked          Activities of Daily Living    Not Asked                     OBJECTIVE:     Mental Status Evaluation:    Appearance age appropriate, casually dressed   Behavior Mild depression   Speech normal volume, normal pitch   Mood Tearful   Affect Variable   Thought Processes Ruminates   Associations intact associations   Thought Content No overt delusion   Perceptual Disturbances: None   Abnormal Thoughts  Risk Potential Suicidal ideation - None  Homicidal ideation - None  Potential for aggression - no   Orientation oriented to person, place, time/date and situation   Memory recent and remote memory grossly intact   Cosciousness alert and awake   Attention Span attention span and concentration are age appropriate   Intellect Appears to be of Average Intelligence   Insight Good   Judgement Good   Muscle Strength and  Gait muscle strength and tone were normal   Language no difficulty naming common objects   Fund of Knowledge displays adequate knowledge of current events   Pain None   Pain Scale 0       Assessment/Plan:       Diagnoses and all orders for this visit:    B[facility] depression ([facility] )    Moderate episode of recurrent major depressive disorder (HCC)  -     escitalopram (LEXAPRO) 5 mg tablet; Take 1 tablet (5 mg total) by mouth daily    Bipolar I disorder with depression ([facility] )  -     traZODone (DESYREL) 100 mg tablet; 1/2-2 HS  -     clonazePAM (KlonoPIN) 0 5 mg tablet; TID PRN          Treatment Recommendations/Precautions:    Continue current medications: Add Lexapro 5 mg daily  Trazodone 100 mg may take up to 200 mg HS p r n  for sleep  Klonopin 0 5 mg t i d  P r n  For anxiety  Wellbutrin  mg daily  Gabapentin 400 mg t i d  Follow-up with me in 4-6 weeks or sooner if necessary    Risks/Benefits      Risks, Benefits And Possible Side Effects Of Medications:    Risks, benefits, and possible side effects of medications explained to patient and patient verbalizes understanding and agreement for treatment  Risk assessment:  Based on today's interview, the patient is not a risk for self-harm or harm to others  Controlled Medication Discussion:  Patient is aware of all the risks and the benefits of taking benzodiazepine  She fills all prescriptions on time          Psychotherapy Provided:     Individual psychotherapy provided: No

## 2022-07-29 ENCOUNTER — LAB REQUISITION (OUTPATIENT)
Dept: LAB | Facility: HOSPITAL | Age: 62
End: 2022-07-29
Payer: COMMERCIAL

## 2022-07-29 DIAGNOSIS — E11.9 TYPE 2 DIABETES MELLITUS WITHOUT COMPLICATIONS (HCC): ICD-10-CM

## 2022-07-29 LAB
ALBUMIN SERPL BCP-MCNC: 3.9 G/DL (ref 3.5–5)
ALP SERPL-CCNC: 88 U/L (ref 46–116)
ALT SERPL W P-5'-P-CCNC: 31 U/L (ref 12–78)
ANION GAP SERPL CALCULATED.3IONS-SCNC: 5 MMOL/L (ref 4–13)
AST SERPL W P-5'-P-CCNC: 27 U/L (ref 5–45)
BILIRUB SERPL-MCNC: 0.61 MG/DL (ref 0.2–1)
BUN SERPL-MCNC: 12 MG/DL (ref 5–25)
CALCIUM SERPL-MCNC: 10 MG/DL (ref 8.3–10.1)
CHLORIDE SERPL-SCNC: 110 MMOL/L (ref 96–108)
CHOLEST SERPL-MCNC: 199 MG/DL
CO2 SERPL-SCNC: 25 MMOL/L (ref 21–32)
CREAT SERPL-MCNC: 1.09 MG/DL (ref 0.6–1.3)
ERYTHROCYTE [DISTWIDTH] IN BLOOD BY AUTOMATED COUNT: 14.6 % (ref 11.6–15.1)
EST. AVERAGE GLUCOSE BLD GHB EST-MCNC: 111 MG/DL
GFR SERPL CREATININE-BSD FRML MDRD: 54 ML/MIN/1.73SQ M
GLUCOSE SERPL-MCNC: 88 MG/DL (ref 65–140)
HBA1C MFR BLD: 5.5 %
HCT VFR BLD AUTO: 41.1 % (ref 34.8–46.1)
HDLC SERPL-MCNC: 39 MG/DL
HGB BLD-MCNC: 12.4 G/DL (ref 11.5–15.4)
LDLC SERPL CALC-MCNC: 130 MG/DL (ref 0–100)
MCH RBC QN AUTO: 24.5 PG (ref 26.8–34.3)
MCHC RBC AUTO-ENTMCNC: 30.2 G/DL (ref 31.4–37.4)
MCV RBC AUTO: 81 FL (ref 82–98)
NONHDLC SERPL-MCNC: 160 MG/DL
PLATELET # BLD AUTO: 175 THOUSANDS/UL (ref 149–390)
PMV BLD AUTO: 13.5 FL (ref 8.9–12.7)
POTASSIUM SERPL-SCNC: 4.1 MMOL/L (ref 3.5–5.3)
PROT SERPL-MCNC: 7.3 G/DL (ref 6.4–8.4)
RBC # BLD AUTO: 5.07 MILLION/UL (ref 3.81–5.12)
SODIUM SERPL-SCNC: 140 MMOL/L (ref 135–147)
TRIGL SERPL-MCNC: 149 MG/DL
WBC # BLD AUTO: 5.15 THOUSAND/UL (ref 4.31–10.16)

## 2022-07-29 PROCEDURE — 83036 HEMOGLOBIN GLYCOSYLATED A1C: CPT | Performed by: FAMILY MEDICINE

## 2022-07-29 PROCEDURE — 85027 COMPLETE CBC AUTOMATED: CPT | Performed by: FAMILY MEDICINE

## 2022-07-29 PROCEDURE — 80053 COMPREHEN METABOLIC PANEL: CPT | Performed by: FAMILY MEDICINE

## 2022-07-29 PROCEDURE — 80061 LIPID PANEL: CPT | Performed by: FAMILY MEDICINE

## 2022-08-11 ENCOUNTER — TELEMEDICINE (OUTPATIENT)
Dept: PSYCHIATRY | Facility: CLINIC | Age: 62
End: 2022-08-11
Payer: COMMERCIAL

## 2022-08-11 DIAGNOSIS — F31.9 BIPOLAR DEPRESSION (HCC): Primary | ICD-10-CM

## 2022-08-11 PROCEDURE — 99214 OFFICE O/P EST MOD 30 MIN: CPT | Performed by: NURSE PRACTITIONER

## 2022-08-11 NOTE — PSYCH
Virtual Regular Visit    Verification of patient location:    Patient is located in the following state in which I hold an active license [place]  Assessment/Plan:    Problem List Items Addressed This Visit    None     Visit Diagnoses     Bipolar depression ([facility] )    -  Primary          Goals addressed in session:  Maintain current level of stability          Reason for visit is   Chief Complaint   Patient presents with    Virtual Regular Visit        Encounter provider RUSSELL Stinson    Provider located at [address] [address]      Recent Visits  No visits were found meeting these conditions  Showing recent visits within past 7 days and meeting all other requirements  Future Appointments  No visits were found meeting these conditions  Showing future appointments within next 150 days and meeting all other requirements       The patient was identified by name and date of birth  Nel Turner was informed that this is a telemedicine visit and that the visit is being conducted through[employer] and patient was informed that this is a secure, HIPAA-compliant platform  She agrees to proceed     My office door was closed  No one else was in the room  She acknowledged consent and understanding of privacy and security of the video platform  The patient has agreed to participate and understands they can discontinue the visit at any time  Patient is aware this is a billable service  Subjective  Nel Turner is a 60 y o  female has a history of bipolar depressive disorder  We did attempt to connect via video but unfortunately, technology did not allow for it  Fortunately on examination today, she is doing very well  Her medication regimen is perfect for her current mood stability  She is reporting no breakthrough mood fluctuations at all    She feels happy and content and reports no other problems or concerns  Will continue her on her current medication regimen  And follow-up will be in 3 months or sooner if necessary  Sleep and appetite are good  Currently, enjoying life without episodes of mood fluctuation  She will continue on: Wellbutrin  mg t i d    Klonopin 0 5 mg t i d  p r n  Gabapentin 400 mg t i d   Trazodone 100 mg, 1/2-2 tablets HS   Lexapro 5 mg daily   Follow-up with me in 3 months or sooner if necessary  Mental status exam:  Patient is awake and alert oriented x3  Mood is stable  Patient is not suicidal, not homicidal not psychotic  Speech is clear and thoughts are well organized, coherent goal-directed  Judgment insight are good  Memory is good  Past Medical History:   Diagnosis Date    Anxiety     Arthritis     Back pain     Lower back    Bipolar 1 disorder (HCC)     Depression     Diverticulosis     Hip dysplasia     Bilateral    Hyperlipidemia     Knee pain, right     Obesity     Psoriasis     Along hairline   Stress incontinence        Past Surgical History:   Procedure Laterality Date    COLONOSCOPY      DILATION AND CURETTAGE OF UTERUS      One D&C and one D&E    ESOPHAGOGASTRODUODENOSCOPY      NASAL SEPTUM SURGERY      RI KNEE SCOPE,MED/LAT MENISECTOMY Right 9/27/2018    Procedure: ARTHROSCOPY KNEE; PARTIAL MEDIAL MENISECTOMY; CHONDROPLASTY;  Surgeon: Libby Granger DO;  Location: [facility];  Service: Orthopedics    TONSILECTOMY AND ADNOIDECTOMY      TONSILLECTOMY         Current Outpatient Medications   Medication Sig Dispense Refill    buPROPion (WELLBUTRIN SR) 100 mg 12 hr tablet TAKE 1 TABLET BY MOUTH AT  BREAKFAST, LUNCH AND DINNER 270 tablet 0    clonazePAM (KlonoPIN) 0 5 mg tablet TID  tablet 1    gabapentin (NEURONTIN) 400 mg capsule Take 1 capsule (400 mg total) by mouth in the morning and 1 capsule (400 mg total) in the evening and 1 capsule (400 mg total) before bedtime   90 capsule 0    traZODone (DESYREL) 100 mg tablet 1/2-2  tablet 3    acetaminophen (TYLENOL) 500 mg tablet Take 500 mg by mouth every 6 (six) hours as needed for mild pain      escitalopram (LEXAPRO) 5 mg tablet Take 1 tablet (5 mg total) by mouth daily 30 tablet 1    Multiple Vitamin (MULTIVITAMIN) capsule Take 1 capsule by mouth daily      oxybutynin (DITROPAN-XL) 10 MG 24 hr tablet Take 10 mg by mouth daily at bedtime      rosuvastatin (CRESTOR) 10 MG tablet       Trulicity 3 95 WL/1 8LL SOPN inject 0 5 milliliters ( 0 75 milligrams ) subcutaneously every week       No current facility-administered medications for this visit  Allergies   Allergen Reactions    Banana - Food Allergy Hives     Itching and swelling     Crab (Diagnostic) - Food Allergy Hives    Cleocin [Clindamycin]      Causes C Diff    Latex      Pt told not to be in contact with latex because of banana allergy    Penicillins Other (See Comments)     Causes vaginal itching and must take Diflucan with it       Review of Systems    Video Exam    There were no vitals filed for this visit      Physical Exam     I spent 25 minutes with patient today in which greater than 50% of the time was spent in counseling/coordination of care regarding Medication management, treatment and chart review

## 2022-08-11 NOTE — PSYCH
TREATMENT PLAN (Medication Management Only)  Treatment Plan done but not signed at time of office visit due to:  Plan reviewed by phone or in person  and verbal consent given due to [address]    Name and Date of Birth:  Orlando Rae 60 y o  1960  Date of Treatment Plan: August 11, 2022  Diagnosis/Diagnoses:    1  Bipolar depression [facility])      Strengths/Personal Resources for Self-Care: supportive family, supportive friends  Area/Areas of need (in own words): "Feeling pretty good"  1  Long Term Goal: "To continue to feel well and function at baseline"  Target Date: 6 months - 2/11/2023  Person/Persons responsible for completion of goal: Sindhu MCKENZIE  2  Short Term Objective (s) - How will we reach this goal?:   A  Provider new recommended medication/dosage changes and/or continue medication(s): continue current medications as prescribed  B   N/A  Target Date: 6 months - 2/11/2023  Person/Persons Responsible for Completion of Goal: Hoang Clark  Progress Towards Goals: stable  Treatment Modality: medication education at every visit  Review due 6 months from date of this plan: 6 months - 2/11/2023  Expected length of service: ongoing treatment unless revised  My Physician/PA/NP and I have developed this plan together and I agree to work on the goals and objectives  I understand the treatment goals that were developed for my treatment

## 2022-08-14 DIAGNOSIS — F33.1 MODERATE EPISODE OF RECURRENT MAJOR DEPRESSIVE DISORDER (HCC): ICD-10-CM

## 2022-08-15 RX ORDER — ESCITALOPRAM OXALATE 5 MG/1
TABLET ORAL
Qty: 30 TABLET | Refills: 1 | Status: SHIPPED | OUTPATIENT
Start: 2022-08-15 | End: 2022-09-16 | Stop reason: SDUPTHER

## 2022-08-16 DIAGNOSIS — F33.1 MODERATE EPISODE OF RECURRENT MAJOR DEPRESSIVE DISORDER (HCC): ICD-10-CM

## 2022-08-16 RX ORDER — BUPROPION HYDROCHLORIDE 100 MG/1
TABLET, EXTENDED RELEASE ORAL
Qty: 270 TABLET | Refills: 0 | Status: SHIPPED | OUTPATIENT
Start: 2022-08-16 | End: 2022-10-28

## 2022-08-29 ENCOUNTER — APPOINTMENT (OUTPATIENT)
Dept: RADIOLOGY | Facility: CLINIC | Age: 62
End: 2022-08-29
Payer: COMMERCIAL

## 2022-08-29 DIAGNOSIS — M79.672 LEFT FOOT PAIN: ICD-10-CM

## 2022-08-29 PROCEDURE — 73630 X-RAY EXAM OF FOOT: CPT

## 2022-09-09 DIAGNOSIS — F31.9 BIPOLAR DISORDER WITH DEPRESSION (HCC): ICD-10-CM

## 2022-09-09 DIAGNOSIS — F31.9 BIPOLAR I DISORDER WITH DEPRESSION (HCC): ICD-10-CM

## 2022-09-09 RX ORDER — GABAPENTIN 400 MG/1
400 CAPSULE ORAL 3 TIMES DAILY
Qty: 90 CAPSULE | Refills: 0 | Status: SHIPPED | OUTPATIENT
Start: 2022-09-09 | End: 2022-09-19 | Stop reason: SDUPTHER

## 2022-09-13 DIAGNOSIS — F33.1 MODERATE EPISODE OF RECURRENT MAJOR DEPRESSIVE DISORDER (HCC): ICD-10-CM

## 2022-09-13 RX ORDER — ESCITALOPRAM OXALATE 5 MG/1
5 TABLET ORAL DAILY
Qty: 30 TABLET | Refills: 1 | Status: CANCELLED | OUTPATIENT
Start: 2022-09-13

## 2022-09-16 ENCOUNTER — TELEPHONE (OUTPATIENT)
Dept: PSYCHIATRY | Facility: CLINIC | Age: 62
End: 2022-09-16

## 2022-09-16 DIAGNOSIS — F33.1 MODERATE EPISODE OF RECURRENT MAJOR DEPRESSIVE DISORDER (HCC): ICD-10-CM

## 2022-09-16 RX ORDER — ESCITALOPRAM OXALATE 5 MG/1
5 TABLET ORAL DAILY
Qty: 90 TABLET | Refills: 2 | Status: SHIPPED | OUTPATIENT
Start: 2022-09-16 | End: 2022-09-27 | Stop reason: SDUPTHER

## 2022-09-16 NOTE — TELEPHONE ENCOUNTER
Garrett Berry picked up her last 30 day refill of lexapro from [facility] on 9/13  She would like a 90 day sent to [facility]

## 2022-09-19 ENCOUNTER — TELEPHONE (OUTPATIENT)
Dept: PSYCHIATRY | Facility: CLINIC | Age: 62
End: 2022-09-19

## 2022-09-19 DIAGNOSIS — F31.9 BIPOLAR I DISORDER WITH DEPRESSION (HCC): ICD-10-CM

## 2022-09-19 DIAGNOSIS — F31.9 BIPOLAR DISORDER WITH DEPRESSION (HCC): ICD-10-CM

## 2022-09-19 RX ORDER — GABAPENTIN 400 MG/1
400 CAPSULE ORAL 3 TIMES DAILY
Qty: 270 CAPSULE | Refills: 2 | Status: SHIPPED | OUTPATIENT
Start: 2022-09-19 | End: 2023-05-25

## 2022-09-19 NOTE — TELEPHONE ENCOUNTER
Fax from [facility] requesting refill for Gabapentin  Script sent to [facility] on 9/9  LM on Evelia's VM to call the office if she needs script sent to [facility] instead  [facility] and was informed that script was picked up on 9/9 for a 30 day supply  Called [facility] and advised them that medication was already filled at [facility]  Will refer to Dr Karen Wade for his information

## 2022-09-27 DIAGNOSIS — F33.1 MODERATE EPISODE OF RECURRENT MAJOR DEPRESSIVE DISORDER (HCC): ICD-10-CM

## 2022-09-27 RX ORDER — ESCITALOPRAM OXALATE 5 MG/1
5 TABLET ORAL DAILY
Qty: 90 TABLET | Refills: 2 | Status: SHIPPED | OUTPATIENT
Start: 2022-09-27 | End: 2023-04-06 | Stop reason: SDUPTHER

## 2022-10-11 ENCOUNTER — OFFICE VISIT (OUTPATIENT)
Dept: OBGYN CLINIC | Facility: MEDICAL CENTER | Age: 62
End: 2022-10-11
Payer: COMMERCIAL

## 2022-10-11 ENCOUNTER — APPOINTMENT (OUTPATIENT)
Dept: RADIOLOGY | Facility: MEDICAL CENTER | Age: 62
End: 2022-10-11
Payer: COMMERCIAL

## 2022-10-11 VITALS
WEIGHT: 216 LBS | SYSTOLIC BLOOD PRESSURE: 127 MMHG | HEART RATE: 77 BPM | BODY MASS INDEX: 35.99 KG/M2 | DIASTOLIC BLOOD PRESSURE: 84 MMHG | HEIGHT: 65 IN

## 2022-10-11 DIAGNOSIS — M25.552 BILATERAL HIP PAIN: ICD-10-CM

## 2022-10-11 DIAGNOSIS — M70.61 GREATER TROCHANTERIC BURSITIS OF BOTH HIPS: Primary | ICD-10-CM

## 2022-10-11 DIAGNOSIS — M16.0 BILATERAL PRIMARY OSTEOARTHRITIS OF HIP: ICD-10-CM

## 2022-10-11 DIAGNOSIS — M25.551 BILATERAL HIP PAIN: ICD-10-CM

## 2022-10-11 DIAGNOSIS — M70.62 GREATER TROCHANTERIC BURSITIS OF BOTH HIPS: Primary | ICD-10-CM

## 2022-10-11 PROCEDURE — 20610 DRAIN/INJ JOINT/BURSA W/O US: CPT | Performed by: ORTHOPAEDIC SURGERY

## 2022-10-11 PROCEDURE — 73521 X-RAY EXAM HIPS BI 2 VIEWS: CPT

## 2022-10-11 PROCEDURE — 99214 OFFICE O/P EST MOD 30 MIN: CPT | Performed by: ORTHOPAEDIC SURGERY

## 2022-10-11 RX ORDER — BUPIVACAINE HYDROCHLORIDE 5 MG/ML
1 INJECTION, SOLUTION PERINEURAL
Status: COMPLETED | OUTPATIENT
Start: 2022-10-11 | End: 2022-10-11

## 2022-10-11 RX ORDER — TRIAMCINOLONE ACETONIDE 40 MG/ML
20 INJECTION, SUSPENSION INTRA-ARTICULAR; INTRAMUSCULAR
Status: COMPLETED | OUTPATIENT
Start: 2022-10-11 | End: 2022-10-11

## 2022-10-11 RX ADMIN — TRIAMCINOLONE ACETONIDE 20 MG: 40 INJECTION, SUSPENSION INTRA-ARTICULAR; INTRAMUSCULAR at 09:56

## 2022-10-11 RX ADMIN — BUPIVACAINE HYDROCHLORIDE 1 ML: 5 INJECTION, SOLUTION PERINEURAL at 09:56

## 2022-10-11 NOTE — PROGRESS NOTES
Assessment/Plan     1  Greater trochanteric bursitis of both hips    2  Bilateral primary osteoarthritis of hip      Orders Placed This Encounter   Procedures   • Large joint arthrocentesis   • XR hips bilateral 2 vw w pelvis if performed     - Findings today are consistent with bilateral greater trochanteric bursitis with mild bilateral hip osteoarthritis  Imaging and prognosis was reviewed with the patient today  - Cortisone steroid injection was administered today to bilateral greater trochanteric bursas  Patient should avoid strenuous activities for the next 1-2 days  Patient should avoid vaccines for the next 2 weeks if possible  If patient is diabetic, should monitor glucose levels for the next 7 to 10 days  Can apply cold compress for soreness  If patient feels relief with the cortisone injections, procedure can be repeated every 3 months   - Discussed with patient to pay attention to her symptoms over the next couple hours and note if they improve with the CSI's  - Offered patient a referral to PT for exercising and stretching of bilateral IT bands- patient deferred at this time since she remembers the exercises that she completed originally  - Patient should continue doing her home exercises/ stretching for her IT bands  - Tylenol as needed: 1000mg every 8 hours - do not exceed 3000mg daily  Return in about 2 months (around 12/11/2022)  I answered all of the patient's questions during the visit and provided education of the patient's condition during the visit  The patient verbalized understanding of the information given and agrees with the plan  This note was dictated using Evostor software  It may contain errors including improperly dictated words  Please contact physician directly for any questions      Subjective   Chief Complaint:   Chief Complaint   Patient presents with   • Left Hip - Pain   • Right Hip - Pain       HPI:  James Cullen is a 60 y o  female who presents for follow up for bilateral hip pain  Patient was last seen on 8/10/2022 for her right hip and recieved a CSI to the right greater trochanteric bursa  Patient believes to have gotten good relief from the injection  Patient note that she has had bilateral hip pain since she gave birth to her child 40+ years ago  Patient states the right is worse than the left  Pain is located lateral hip and she denies any pain radiating into her groin  Patient notes that she has IT band tightness on the right side  Patient states she has trouble with sleeping on the right side- causes her pain that radiates lateral leg and radiates into the knee  Patient also notes pain is present with walking, standing for long periods of time, going up and down stairs and sitting  Patient takes tylenol or ibuprofen as needed for the pain  Patient notes doing physical therapy for her right hip in 2020  Patient also notes history of lumbar spine pain and bilateral hip dysplasia  Patient can take NSAIDS  Review of Systems  See HPI for musculoskeletal review  All other systems reviewed are negative     History:  Past Medical History:   Diagnosis Date   • Anxiety    • Arthritis    • Back pain     Lower back   • Bipolar 1 disorder (HCC)    • Depression    • Diverticulosis    • Hip dysplasia     Bilateral   • Hyperlipidemia    • Knee pain, right    • Obesity    • Psoriasis     Along hairline     • Stress incontinence      Past Surgical History:   Procedure Laterality Date   • COLONOSCOPY     • DILATION AND CURETTAGE OF UTERUS      One D&C and one D&E   • ESOPHAGOGASTRODUODENOSCOPY     • NASAL SEPTUM SURGERY     • MD KNEE SCOPE,MED/LAT MENISECTOMY Right 9/27/2018    Procedure: ARTHROSCOPY KNEE; PARTIAL MEDIAL MENISECTOMY; CHONDROPLASTY;  Surgeon: Marce Marcial DO;  Location: [facility];  Service: Orthopedics   • TONSILECTOMY AND ADNOIDECTOMY     • TONSILLECTOMY       Social History   Social History     Substance and Sexual Activity   Alcohol Use Yes Comment: rare     Social History     Substance and Sexual Activity   Drug Use No     Social History     Tobacco Use   Smoking Status Never Smoker   Smokeless Tobacco Never Used     Family History:   Family History   Problem Relation Age of Onset   • Asthma Mother    • No Known Problems Father    • Rectal cancer Sister 62   • No Known Problems Daughter    • No Known Problems Maternal Grandmother    • No Known Problems Maternal Grandfather    • No Known Problems Paternal Grandmother    • No Known Problems Paternal Grandfather    • No Known Problems Paternal Aunt        Current Outpatient Medications on File Prior to Visit   Medication Sig Dispense Refill   • acetaminophen (TYLENOL) 500 mg tablet Take 500 mg by mouth every 6 (six) hours as needed for mild pain     • buPROPion (WELLBUTRIN SR) 100 mg 12 hr tablet TAKE 1 TABLET BY MOUTH AT  BREAKFAST, LUNCH AND DINNER 270 tablet 0   • clonazePAM (KlonoPIN) 0 5 mg tablet TID  tablet 1   • escitalopram (LEXAPRO) 5 mg tablet Take 1 tablet (5 mg total) by mouth daily 90 tablet 2   • gabapentin (NEURONTIN) 400 mg capsule Take 1 capsule (400 mg total) by mouth 3 (three) times a day 270 capsule 2   • Multiple Vitamin (MULTIVITAMIN) capsule Take 1 capsule by mouth daily     • oxybutynin (DITROPAN-XL) 10 MG 24 hr tablet Take 10 mg by mouth daily at bedtime     • rosuvastatin (CRESTOR) 10 MG tablet      • traZODone (DESYREL) 100 mg tablet 1/2-2  tablet 3   • Trulicity 3 22 XQ/0 4NQ SOPN inject 0 5 milliliters ( 0 75 milligrams ) subcutaneously every week       No current facility-administered medications on file prior to visit       Allergies   Allergen Reactions   • Banana - Food Allergy Hives     Itching and swelling    • Crab (Diagnostic) - Food Allergy Hives   • Cleocin [Clindamycin]      Causes C Diff   • Latex      Pt told not to be in contact with latex because of banana allergy   • Penicillins Other (See Comments)     Causes vaginal itching and must take Diflucan with it        Objective     /84   Pulse 77   Ht 5' 5" (1 651 m)   Wt 98 kg (216 lb)   BMI 35 94 kg/m²      PE:  AAOx 3  WDWN  Hearing intact, no drainage from eyes  no audible wheezing  no abdominal distension  LE compartments soft, skin intact    bilateral hip:   No dislocation/deformity  Neg right Stinchfield,  +left Stinchfield  ROM Right: Flexion: 100 ER: 45 (pain) IR: 45  ROM Left: Flexion: 100 ER: 45 (pain) IR: 45  Neg  Ines Test bilateral  + Impingement test bilateral   TTP over greater trochanter bilateral   Abduction: 5/5  Neg  Anna's test  No TTP over SIJ    AT/GS intact    RLE:  EHL/AT/GS/quads/hamstrings/iliopsoas 5/5, sensation grossly intact L4, L5, S1, palpable pedal pulse  LLE:  EHL/AT/GS/quads/hamstrings/iliopsoas 5/5, sensation grossly intact L4, L5, S1, palpable pedal pulse    Imaging Studies: I have personally reviewed pertinent films in PACS  XR bilateral hip: demonstrates mild degenerative changes- mild osteoarthrits of bilateral hips  Large joint arthrocentesis: bilateral greater trochanteric bursa  Universal Protocol:  Consent: Verbal consent obtained  Risks and benefits: risks, benefits and alternatives were discussed  Consent given by: patient  Time out: Immediately prior to procedure a "time out" was called to verify the correct patient, procedure, equipment, support staff and site/side marked as required    Timeout called at: 10/11/2022 9:54 AM   Patient understanding: patient states understanding of the procedure being performed  Site marked: the operative site was marked  Patient identity confirmed: verbally with patient    Supporting Documentation  Indications: pain   Procedure Details  Location: hip - bilateral greater trochanteric bursa  Preparation: Patient was prepped and draped in the usual sterile fashion  Needle size: 22 G  Ultrasound guidance: no  Approach: lateral    Medications (Right): 1 mL bupivacaine 0 5 %; 20 mg triamcinolone acetonide 40 mg/mLMedications (Left): 20 mg triamcinolone acetonide 40 mg/mL; 1 mL bupivacaine 0 5 %   Patient tolerance: patient tolerated the procedure well with no immediate complications  Dressing:  Sterile dressing applied          Scribe Attestation    I,:  Anne Trujillo am acting as a scribe while in the presence of the attending physician :       I,:  Jacqui Menjivar DO personally performed the services described in this documentation    as scribed in my presence :

## 2022-11-04 ENCOUNTER — HOSPITAL ENCOUNTER (OUTPATIENT)
Dept: MAMMOGRAPHY | Facility: HOSPITAL | Age: 62
End: 2022-11-04
Attending: FAMILY MEDICINE

## 2022-11-04 VITALS — BODY MASS INDEX: 34.49 KG/M2 | WEIGHT: 207 LBS | HEIGHT: 65 IN

## 2022-11-04 DIAGNOSIS — Z12.31 SCREENING MAMMOGRAM, ENCOUNTER FOR: ICD-10-CM

## 2022-11-16 ENCOUNTER — OFFICE VISIT (OUTPATIENT)
Dept: PSYCHIATRY | Facility: CLINIC | Age: 62
End: 2022-11-16

## 2022-11-16 DIAGNOSIS — F31.9 BIPOLAR DEPRESSION (HCC): ICD-10-CM

## 2022-11-16 NOTE — PSYCH
PROGRESS NOTE        [facility]      Name and Date of Birth:  Pawel Watts 60 y o  1960    Date of Visit: 11/16/22    SUBJECTIVE:  Patient is a 11-year-old female has a history of bipolar depressive disorder  With her medication adjustments over the last several months, she is feeling very well  She has no complaints today  She is smiling, she is happy and she feels content  There have been some medical issues within her family and today, she is feeling confident that these family members are improving and doing better  She is sleeping well and she is eating well and she is enjoying her grandchildren  She and her  are getting along fine and enjoying each other  Follow-up with me in 3 months or sooner if necessary  She denies suicidal ideation, intent or plan at present, has no suicidal ideation, intent or plan at present  She denies any auditory hallucinations and visual hallucinations, denies any other delusional thinking, denies any delusional thinking  She denies any side effects from medications    HPI ROS Appetite Changes and Sleep: normal appetite, normal sleep    Review Of Systems:      Constitutional Negative   ENT Negative   Cardiovascular Negative   Respiratory As Noted in HPI   Gastrointestinal Negative   Genitourinary Negative   Musculoskeletal Negative   Integumentary Negative   Neurological Negative   Endocrine Negative   Other Symptoms Negative and None       Laboratory Results: No results found for this or any previous visit      Substance Abuse History:    Social History     Substance and Sexual Activity   Drug Use No       Family Psychiatric History:     Family History   Problem Relation Age of Onset   • Asthma Mother    • No Known Problems Father    • Rectal cancer Sister 62   • No Known Problems Daughter    • No Known Problems Maternal Grandmother    • No Known Problems Maternal Grandfather    • No Known Problems Paternal Grandmother    • No Known Problems Paternal Grandfather    • No Known Problems Paternal Aunt        The following portions of the patient's history were reviewed and updated as appropriate: past family history, past medical history, past social history, past surgical history and problem list     Social History     Socioeconomic History   • Marital status: /Civil Union     Spouse name: Not on file   • Number of children: Not on file   • Years of education: Not on file   • Highest education level: Not on file   Occupational History   • Not on file   Tobacco Use   • Smoking status: Never   • Smokeless tobacco: Never   Vaping Use   • Vaping Use: Never used   Substance and Sexual Activity   • Alcohol use: Yes     Comment: rare   • Drug use: No   • Sexual activity: Not on file   Other Topics Concern   • Not on file   Social History Narrative   • Not on file     Social Determinants of Health     Financial Resource Strain: Not on file   Food Insecurity: Not on file   Transportation Needs: Not on file   Physical Activity: Not on file   Stress: Not on file   Social Connections: Not on file   Intimate Partner Violence: Not on file   Housing Stability: Not on file     Social History     Social History Narrative   • Not on file        Social History     Tobacco History     Smoking Status  Never    Smokeless Tobacco Use  Never          Alcohol History     Alcohol Use Status  Yes Comment  rare          Drug Use     Drug Use Status  No          Sexual Activity     Sexually Active  Not Asked          Activities of Daily Living    Not Asked                     OBJECTIVE:     Mental Status Evaluation:    Appearance age appropriate, casually dressed   Behavior pleasant, cooperative   Speech normal volume, normal pitch   Mood Improved   Affect brighter   Thought Processes logical   Associations intact associations   Thought Content Normal   Perceptual Disturbances: None   Abnormal Thoughts  Risk Potential Suicidal ideation - None  Homicidal ideation - None  Potential for aggression - No   Orientation oriented to person, place, time/date and situation   Memory recent and remote memory grossly intact   Cosciousness alert and awake   Attention Span attention span and concentration are age appropriate   Intellect Appears to be of Average Intelligence   Insight Good   Judgement Good   Muscle Strength and  Gait muscle strength and tone were normal   Language no difficulty naming common objects   Fund of Knowledge displays adequate knowledge of current events   Pain None   Pain Scale 0       Assessment/Plan:       There are no diagnoses linked to this encounter  Treatment Recommendations/Precautions:    Continue current medications: Wellbutrin  mg t i d   Klonopin 0 5 mg t i d  P r n  for anxiety  Lexapro 5 mg daily  Gabapentin 400 mg t i d   Trazodone 100 mg to 200 mg HS  Follow-up with me in 3 months or sooner if necessary            Risks/Benefits      Risks, Benefits And Possible Side Effects Of Medications:    Risks, benefits, and possible side effects of medications explained to patient and patient verbalizes understanding and agreement for treatment  Controlled Medication Discussion:  No history of any overuse or abuse of controlled substances  Patient does not take benzodiazepine in operate motor vehicle  Psychotherapy Provided:     Individual psychotherapy provided: Yes  Discussed family members that are dealing with medical issues  We continue to discuss stress relieving activities she can employ when home

## 2022-11-21 DIAGNOSIS — F31.9 BIPOLAR I DISORDER WITH DEPRESSION (HCC): ICD-10-CM

## 2022-11-21 RX ORDER — CLONAZEPAM 0.5 MG/1
TABLET ORAL
Qty: 270 TABLET | Refills: 1 | Status: SHIPPED | OUTPATIENT
Start: 2022-11-21

## 2022-11-29 ENCOUNTER — APPOINTMENT (EMERGENCY)
Dept: RADIOLOGY | Facility: HOSPITAL | Age: 62
End: 2022-11-29

## 2022-11-29 ENCOUNTER — HOSPITAL ENCOUNTER (EMERGENCY)
Facility: HOSPITAL | Age: 62
Discharge: HOME | End: 2022-11-29
Attending: EMERGENCY MEDICINE

## 2022-11-29 ENCOUNTER — APPOINTMENT (EMERGENCY)
Dept: CT IMAGING | Facility: HOSPITAL | Age: 62
End: 2022-11-29

## 2022-11-29 VITALS
HEART RATE: 49 BPM | OXYGEN SATURATION: 97 % | TEMPERATURE: 98.7 F | RESPIRATION RATE: 25 BRPM | DIASTOLIC BLOOD PRESSURE: 61 MMHG | SYSTOLIC BLOOD PRESSURE: 128 MMHG

## 2022-11-29 DIAGNOSIS — S40.212A ABRASION OF LEFT SHOULDER: ICD-10-CM

## 2022-11-29 DIAGNOSIS — S80.211A ABRASION OF RIGHT KNEE: ICD-10-CM
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
## 2022-11-29 LAB
ALBUMIN SERPL BCP-MCNC: 4.4 G/DL (ref 3.5–5)
ALP SERPL-CCNC: 73 U/L (ref 34–104)
ALT SERPL W P-5'-P-CCNC: 20 U/L (ref 7–52)
ANION GAP SERPL CALCULATED.3IONS-SCNC: 8 MMOL/L (ref 4–13)
APTT PPP: 29 SECONDS (ref 23–37)
AST SERPL W P-5'-P-CCNC: 20 U/L (ref 13–39)
BASOPHILS # BLD AUTO: 0.04 THOUSANDS/ÂΜL (ref 0–0.1)
BASOPHILS NFR BLD AUTO: 1 % (ref 0–1)
BILIRUB SERPL-MCNC: 0.57 MG/DL (ref 0.2–1)
BUN SERPL-MCNC: 12 MG/DL (ref 5–25)
CALCIUM SERPL-MCNC: 10.2 MG/DL (ref 8.4–10.2)
CHLORIDE SERPL-SCNC: 105 MMOL/L (ref 96–108)
CO2 SERPL-SCNC: 25 MMOL/L (ref 21–32)
CREAT SERPL-MCNC: 1.03 MG/DL (ref 0.6–1.3)
EOSINOPHIL # BLD AUTO: 0.22 THOUSAND/ÂΜL (ref 0–0.61)
EOSINOPHIL NFR BLD AUTO: 3 % (ref 0–6)
ERYTHROCYTE [DISTWIDTH] IN BLOOD BY AUTOMATED COUNT: 15.9 % (ref 11.6–15.1)
GFR SERPL CREATININE-BSD FRML MDRD: 57 ML/MIN/1.73SQ M
GLUCOSE SERPL-MCNC: 87 MG/DL (ref 65–140)
HCT VFR BLD AUTO: 44.3 % (ref 34.8–46.1)
HGB BLD-MCNC: 13.6 G/DL (ref 11.5–15.4)
IMM GRANULOCYTES # BLD AUTO: 0.02 THOUSAND/UL (ref 0–0.2)
IMM GRANULOCYTES NFR BLD AUTO: 0 % (ref 0–2)
INR PPP: 1.02 (ref 0.84–1.19)
LYMPHOCYTES # BLD AUTO: 1.29 THOUSANDS/ÂΜL (ref 0.6–4.47)
LYMPHOCYTES NFR BLD AUTO: 18 % (ref 14–44)
MCH RBC QN AUTO: 25 PG (ref 26.8–34.3)
MCHC RBC AUTO-ENTMCNC: 30.7 G/DL (ref 31.4–37.4)
MCV RBC AUTO: 82 FL (ref 82–98)
MONOCYTES # BLD AUTO: 0.6 THOUSAND/ÂΜL (ref 0.17–1.22)
MONOCYTES NFR BLD AUTO: 9 % (ref 4–12)
NEUTROPHILS # BLD AUTO: 4.84 THOUSANDS/ÂΜL (ref 1.85–7.62)
NEUTS SEG NFR BLD AUTO: 69 % (ref 43–75)
NRBC BLD AUTO-RTO: 0 /100 WBCS
PLATELET # BLD AUTO: 172 THOUSANDS/UL (ref 149–390)
PMV BLD AUTO: 12.7 FL (ref 8.9–12.7)
POTASSIUM SERPL-SCNC: 3.8 MMOL/L (ref 3.5–5.3)
PROT SERPL-MCNC: 7.5 G/DL (ref 6.4–8.4)
PROTHROMBIN TIME: 13.4 SECONDS (ref 11.6–14.5)
RBC # BLD AUTO: 5.43 MILLION/UL (ref 3.81–5.12)
SODIUM SERPL-SCNC: 138 MMOL/L (ref 135–147)
WBC # BLD AUTO: 7.01 THOUSAND/UL (ref 4.31–10.16)
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
## 2022-11-30 NOTE — ED NOTES
Patient transported to [facility], [address] [place] [address]  11/29/22 2012

## 2023-01-03 ENCOUNTER — APPOINTMENT (OUTPATIENT)
Dept: LAB | Facility: CLINIC | Age: 63
End: 2023-01-03

## 2023-01-03 DIAGNOSIS — E61.1 IRON DEFICIENCY: ICD-10-CM

## 2023-01-03 DIAGNOSIS — E11.9 TYPE 2 DIABETES MELLITUS WITHOUT COMPLICATION, WITHOUT LONG-TERM CURRENT USE OF INSULIN (HCC): ICD-10-CM

## 2023-01-03 LAB
ALBUMIN SERPL BCP-MCNC: 3.9 G/DL (ref 3.5–5)
ALP SERPL-CCNC: 84 U/L (ref 46–116)
ALT SERPL W P-5'-P-CCNC: 30 U/L (ref 12–78)
ANION GAP SERPL CALCULATED.3IONS-SCNC: 6 MMOL/L (ref 4–13)
AST SERPL W P-5'-P-CCNC: 22 U/L (ref 5–45)
BILIRUB SERPL-MCNC: 0.65 MG/DL (ref 0.2–1)
BUN SERPL-MCNC: 13 MG/DL (ref 5–25)
CALCIUM SERPL-MCNC: 9.8 MG/DL (ref 8.3–10.1)
CHLORIDE SERPL-SCNC: 113 MMOL/L (ref 96–108)
CHOLEST SERPL-MCNC: 113 MG/DL
CO2 SERPL-SCNC: 24 MMOL/L (ref 21–32)
CREAT SERPL-MCNC: 1 MG/DL (ref 0.6–1.3)
ERYTHROCYTE [DISTWIDTH] IN BLOOD BY AUTOMATED COUNT: 15.1 % (ref 11.6–15.1)
FERRITIN SERPL-MCNC: 8 NG/ML (ref 8–388)
GFR SERPL CREATININE-BSD FRML MDRD: 60 ML/MIN/1.73SQ M
GLUCOSE P FAST SERPL-MCNC: 112 MG/DL (ref 65–99)
HCT VFR BLD AUTO: 43.4 % (ref 34.8–46.1)
HDLC SERPL-MCNC: 39 MG/DL
HGB BLD-MCNC: 13.4 G/DL (ref 11.5–15.4)
IRON SATN MFR SERPL: 15 % (ref 15–50)
IRON SERPL-MCNC: 55 UG/DL (ref 50–170)
LDLC SERPL CALC-MCNC: 48 MG/DL (ref 0–100)
MCH RBC QN AUTO: 25.1 PG (ref 26.8–34.3)
MCHC RBC AUTO-ENTMCNC: 30.9 G/DL (ref 31.4–37.4)
MCV RBC AUTO: 81 FL (ref 82–98)
NONHDLC SERPL-MCNC: 74 MG/DL
PLATELET # BLD AUTO: 159 THOUSANDS/UL (ref 149–390)
PMV BLD AUTO: 13.1 FL (ref 8.9–12.7)
POTASSIUM SERPL-SCNC: 4.1 MMOL/L (ref 3.5–5.3)
PROT SERPL-MCNC: 7.4 G/DL (ref 6.4–8.4)
RBC # BLD AUTO: 5.34 MILLION/UL (ref 3.81–5.12)
SODIUM SERPL-SCNC: 143 MMOL/L (ref 135–147)
TIBC SERPL-MCNC: 377 UG/DL (ref 250–450)
TRIGL SERPL-MCNC: 129 MG/DL
VIT B12 SERPL-MCNC: 373 PG/ML (ref 100–900)
WBC # BLD AUTO: 5.13 THOUSAND/UL (ref 4.31–10.16)

## 2023-01-04 LAB
EST. AVERAGE GLUCOSE BLD GHB EST-MCNC: 114 MG/DL
HBA1C MFR BLD: 5.6 %

## 2023-01-23 DIAGNOSIS — F31.9 BIPOLAR I DISORDER WITH DEPRESSION (HCC): ICD-10-CM

## 2023-01-23 RX ORDER — CLONAZEPAM 0.5 MG/1
TABLET ORAL
Qty: 90 TABLET | Refills: 1 | Status: SHIPPED | OUTPATIENT
Start: 2023-01-23 | End: 2023-01-23 | Stop reason: SDUPTHER

## 2023-01-23 RX ORDER — CLONAZEPAM 0.5 MG/1
TABLET ORAL
Qty: 270 TABLET | Refills: 1 | Status: SHIPPED | OUTPATIENT
Start: 2023-01-23 | End: 2023-01-26 | Stop reason: SDUPTHER

## 2023-01-23 NOTE — TELEPHONE ENCOUNTER
Patient called in requesting 1 month supply be sent to [employer] in [facility] and also a Script be sent to [employer] order at the same time  Patient advised office that provider always sends the scripts like this from them  Office stated they would route the request and message to the provider for review

## 2023-01-26 DIAGNOSIS — F31.9 BIPOLAR I DISORDER WITH DEPRESSION (HCC): ICD-10-CM

## 2023-01-26 DIAGNOSIS — F41.1 GAD (GENERALIZED ANXIETY DISORDER): Primary | ICD-10-CM

## 2023-01-26 RX ORDER — CLONAZEPAM 0.5 MG/1
0.5 TABLET ORAL 3 TIMES DAILY
Qty: 90 TABLET | Refills: 1 | Status: SHIPPED | OUTPATIENT
Start: 2023-01-26 | End: 2023-02-16

## 2023-01-26 RX ORDER — CLONAZEPAM 0.5 MG/1
TABLET ORAL
Qty: 270 TABLET | Refills: 1 | Status: SHIPPED | OUTPATIENT
Start: 2023-01-26

## 2023-02-16 ENCOUNTER — OFFICE VISIT (OUTPATIENT)
Dept: PSYCHIATRY | Facility: CLINIC | Age: 63
End: 2023-02-16

## 2023-02-16 DIAGNOSIS — F41.1 GAD (GENERALIZED ANXIETY DISORDER): ICD-10-CM

## 2023-02-16 RX ORDER — HYDROXYZINE HYDROCHLORIDE 25 MG/1
TABLET, FILM COATED ORAL
Qty: 30 TABLET | Refills: 2 | Status: SHIPPED | OUTPATIENT
Start: 2023-02-16

## 2023-02-16 RX ORDER — CLONAZEPAM 0.5 MG/1
TABLET ORAL
Qty: 90 TABLET | Refills: 1
Start: 2023-02-16

## 2023-02-16 NOTE — PSYCH
TREATMENT PLAN (Medication Management Only)  Treatment Plan done but not signed at time of office visit due to:  Plan reviewed by phone or in person  and verbal consent given due to [address]    Name and Date of Birth:  Jenifer Hodgkin 60 y o  1960  Date of Treatment Plan: February 16, 2023  Diagnosis/Diagnoses:    1  LORAINE (generalized anxiety disorder)      Strengths/Personal Resources for Self-Care: supportive family, supportive friends  Area/Areas of need (in own words): anxiety symptoms  1  Long Term Goal: continue improvement in acceptable anxiety level  Target Date: 6 months - 8/16/2023  Person/Persons responsible for completion of goal: Vannesa MCKENZIE  2  Short Term Objective (s) - How will we reach this goal?:   A  Provider new recommended medication/dosage changes and/or continue medication(s): continue current medications as prescribed  B   N/A  Target Date: 6 months - 8/16/2023  Person/Persons Responsible for Completion of Goal: Brent Gibson  Progress Towards Goals: continuing treatment, progressing  Treatment Modality: medication education at every visit, Discussed regular exercise to help reduce anxiety  Review due 6 months from date of this plan: 6 months - 8/16/2023  Expected length of service: ongoing treatment unless revised  My Physician/PA/NP and I have developed this plan together and I agree to work on the goals and objectives  I understand the treatment goals that were developed for my treatment

## 2023-02-16 NOTE — PSYCH
PROGRESS NOTE        Shantanu Sarmiento      Name and Date of Birth:  Dakota Hatfield 60 y o  1960    Date of Visit: 02/16/23    SUBJECTIVE: Patient is a 58-year-old female has a history of bipolar depressive disorder  She is having some problems with word finding  We did complete a MOCA and she did score 30 out of 30  However, she has trouble coming up with the right word  Couple things are happening, she does have an elevated calcium level which could affect her cognitive performance but also, she is on clonazepam   I am leaning toward the clonazepam as the root cause  So we are going to start to titrate her down on clonazepam   We will start tomorrow and she will reduce the clonazepam by 0 25 mg/week and by the end of March, she will only be on 0 5 mg   I have added hydroxyzine as a as needed to assist in the meantime if she has breakthrough anxiety  Right now, everything is going well on her family, her son, who also suffers from some mood issues is doing very well and her , who also suffers from some mood issues is also doing well  She is sleeping well and eating well  Today we talked about other ways to help reduce anxiety which include regular exercise and activity  She agrees  I will follow up in 8 weeks or sooner if necessary  She denies suicidal ideation, intent or plan at present, has no suicidal ideation, intent or plan at present  She denies any auditory hallucinations and visual hallucinations, denies any other delusional thinking, denies any delusional thinking  She denies any side effects from medications      HPI ROS Appetite Changes and Sleep: normal appetite, normal sleep    Review Of Systems:      Constitutional Negative   ENT Negative   Cardiovascular Negative   Respiratory As Noted in HPI   Gastrointestinal Negative   Genitourinary Negative   Musculoskeletal Negative   Integumentary Negative   Neurological Negative Endocrine Negative   Other Symptoms Negative and None       Laboratory Results: No results found for this or any previous visit      Substance Abuse History:    Social History     Substance and Sexual Activity   Drug Use No       Family Psychiatric History:     Family History   Problem Relation Age of Onset   • Asthma Mother    • No Known Problems Father    • Rectal cancer Sister 62   • No Known Problems Daughter    • No Known Problems Maternal Grandmother    • No Known Problems Maternal Grandfather    • No Known Problems Paternal Grandmother    • No Known Problems Paternal Grandfather    • No Known Problems Paternal Aunt        The following portions of the patient's history were reviewed and updated as appropriate: past family history, past medical history, past social history, past surgical history and problem list     Social History     Socioeconomic History   • Marital status: /Civil Union     Spouse name: Not on file   • Number of children: Not on file   • Years of education: Not on file   • Highest education level: Not on file   Occupational History   • Not on file   Tobacco Use   • Smoking status: Never   • Smokeless tobacco: Never   Vaping Use   • Vaping Use: Never used   Substance and Sexual Activity   • Alcohol use: Yes     Comment: rare   • Drug use: No   • Sexual activity: Not on file   Other Topics Concern   • Not on file   Social History Narrative   • Not on file     Social Determinants of Health     Financial Resource Strain: Not on file   Food Insecurity: Not on file   Transportation Needs: Not on file   Physical Activity: Not on file   Stress: Not on file   Social Connections: Not on file   Intimate Partner Violence: Not on file   Housing Stability: Not on file     Social History     Social History Narrative   • Not on file        Social History     Tobacco History     Smoking Status  Never    Smokeless Tobacco Use  Never          Alcohol History     Alcohol Use Status  Yes Comment  rare Drug Use     Drug Use Status  No          Sexual Activity     Sexually Active  Not Asked          Activities of Daily Living    Not Asked                     OBJECTIVE:     Mental Status Evaluation:    Appearance age appropriate, casually dressed   Behavior pleasant, cooperative   Speech normal volume, normal pitch   Mood  stable for now but does have periods of anxiety breakthrough   Affect  bright affect   Thought Processes logical   Associations intact associations   Thought Content  no overt delusions   Perceptual Disturbances: None   Abnormal Thoughts  Risk Potential Suicidal ideation - None  Homicidal ideation - None  Potential for aggression - No   Orientation oriented to person, place, time/date and situation   Memory recent and remote memory grossly intact   Cosciousness alert and awake   Attention Span attention span and concentration are age appropriate   Intellect Appears to be of Average Intelligence   Insight Good   Judgement Good   Muscle Strength and  Gait muscle strength and tone were normal   Language no difficulty naming common objects   Fund of Knowledge displays adequate knowledge of current events   Pain None   Pain Scale 0       Assessment/Plan:       Diagnoses and all orders for this visit:    LORAINE (generalized anxiety disorder)  -     Basic metabolic panel; Future  -     clonazePAM (KlonoPIN) 0 5 mg tablet; 1 HS  -     hydrOXYzine HCL (ATARAX) 25 mg tablet; 1 every 6 hours PRN for anxiety          Treatment Recommendations/Precautions:    Continue current medications: Wellbutrin  mg twice daily  Klonopin 0 5 mg  We are titrating this medicine on this patient and she will end up with taking 1 at bedtime  We will follow up in early April    Lexapro 5 mg daily  Hydroxyzine 25 mg may take every 6 hours as needed for anxiety  Trazodone 100 mg, 1 or 2 tabs at bedtime  Follow-up with me in 8 weeks    Risks/Benefits      Risks, Benefits And Possible Side Effects Of Medications:    Risks, benefits, and possible side effects of medications explained to patient and patient verbalizes understanding and agreement for treatment  Controlled Medication Discussion: No history of any overuse or abuse of controlled substances  Klonopin may be causing this patient some cognitive issues  We are going to start to titrate down on the dose and use hydroxyzine if needed for breakthrough anxiety  Psychotherapy Provided:     Individual psychotherapy provided: Yes  The visit started at 11 AM and ended at 11:35 AM   We did review the treatment plan, a MoCA was completed, we did discuss ways to reduce stress and anxiety including exercise on a regular basis and took time to complete the progress note

## 2023-04-03 ENCOUNTER — APPOINTMENT (OUTPATIENT)
Dept: LAB | Facility: CLINIC | Age: 63
End: 2023-04-03

## 2023-04-03 DIAGNOSIS — F41.1 GAD (GENERALIZED ANXIETY DISORDER): ICD-10-CM

## 2023-04-03 LAB
ANION GAP SERPL CALCULATED.3IONS-SCNC: 5 MMOL/L (ref 4–13)
BUN SERPL-MCNC: 14 MG/DL (ref 5–25)
CALCIUM SERPL-MCNC: 9.4 MG/DL (ref 8.3–10.1)
CHLORIDE SERPL-SCNC: 111 MMOL/L (ref 96–108)
CO2 SERPL-SCNC: 25 MMOL/L (ref 21–32)
CREAT SERPL-MCNC: 1.12 MG/DL (ref 0.6–1.3)
GFR SERPL CREATININE-BSD FRML MDRD: 52 ML/MIN/1.73SQ M
GLUCOSE P FAST SERPL-MCNC: 105 MG/DL (ref 65–99)
POTASSIUM SERPL-SCNC: 4 MMOL/L (ref 3.5–5.3)
SODIUM SERPL-SCNC: 141 MMOL/L (ref 135–147)

## 2023-04-06 ENCOUNTER — OFFICE VISIT (OUTPATIENT)
Dept: PSYCHIATRY | Facility: CLINIC | Age: 63
End: 2023-04-06

## 2023-04-06 DIAGNOSIS — F33.1 MODERATE EPISODE OF RECURRENT MAJOR DEPRESSIVE DISORDER (HCC): Primary | ICD-10-CM

## 2023-04-06 DIAGNOSIS — F41.1 GAD (GENERALIZED ANXIETY DISORDER): ICD-10-CM

## 2023-04-06 DIAGNOSIS — F31.9 BIPOLAR I DISORDER WITH DEPRESSION (HCC): ICD-10-CM

## 2023-04-06 RX ORDER — TRAZODONE HYDROCHLORIDE 100 MG/1
TABLET ORAL
Qty: 180 TABLET | Refills: 3 | Status: SHIPPED | OUTPATIENT
Start: 2023-04-06

## 2023-04-06 RX ORDER — BUPROPION HYDROCHLORIDE 100 MG/1
TABLET, EXTENDED RELEASE ORAL
Qty: 270 TABLET | Refills: 2 | Status: SHIPPED | OUTPATIENT
Start: 2023-04-06

## 2023-04-06 RX ORDER — ESCITALOPRAM OXALATE 5 MG/1
5 TABLET ORAL DAILY
Qty: 90 TABLET | Refills: 2 | Status: SHIPPED | OUTPATIENT
Start: 2023-04-06

## 2023-04-06 RX ORDER — CLONAZEPAM 0.5 MG/1
TABLET ORAL
Qty: 90 TABLET | Refills: 1 | Status: SHIPPED | OUTPATIENT
Start: 2023-04-06

## 2023-04-06 NOTE — PSYCH
PROGRESS NOTE        Shantanu Sarmiento      Name and Date of Birth:  Jhonny Flowers 60 y o  1960    Date of Visit: 04/06/23    SUBJECTIVE: Patient is a 57-year-old female has a history of bipolar depressive disorder  Overall, she is doing well  She has energy interest motivation  She is working on a very [employer] basis  She enjoys getting back into her career  She is sleeping well and eating well  Good supportive family  She is very pleased that all of her family members are doing well  No new clinical issues or concerns were voiced by patient  Follow-up will be in 3 months or sooner if necessary  She denies suicidal ideation, intent or plan at present, has no suicidal ideation, intent or plan at present  She denies any auditory hallucinations and visual hallucinations, denies any other delusional thinking, denies any delusional thinking  She denies any side effects from medications    HPI ROS Appetite Changes and Sleep: normal appetite, normal sleep    Review Of Systems:      Constitutional Negative   ENT Negative   Cardiovascular Negative   Respiratory As Noted in HPI   Gastrointestinal Negative   Genitourinary Negative   Musculoskeletal Negative   Integumentary Negative   Neurological Negative   Endocrine Negative   Other Symptoms Negative and None       Laboratory Results: No results found for this or any previous visit      Substance Abuse History:    Social History     Substance and Sexual Activity   Drug Use No       Family Psychiatric History:     Family History   Problem Relation Age of Onset   • Asthma Mother    • No Known Problems Father    • Rectal cancer Sister 62   • No Known Problems Daughter    • No Known Problems Maternal Grandmother    • No Known Problems Maternal Grandfather    • No Known Problems Paternal Grandmother    • No Known Problems Paternal Grandfather    • No Known Problems Paternal Aunt        The following portions of the patient's history were reviewed and updated as appropriate: past family history, past medical history, past social history, past surgical history and problem list     Social History     Socioeconomic History   • Marital status: /Civil Union     Spouse name: Not on file   • Number of children: Not on file   • Years of education: Not on file   • Highest education level: Not on file   Occupational History   • Not on file   Tobacco Use   • Smoking status: Never   • Smokeless tobacco: Never   Vaping Use   • Vaping Use: Never used   Substance and Sexual Activity   • Alcohol use: Yes     Comment: rare   • Drug use: No   • Sexual activity: Not on file   Other Topics Concern   • Not on file   Social History Narrative   • Not on file     Social Determinants of Health     Financial Resource Strain: Not on file   Food Insecurity: Not on file   Transportation Needs: Not on file   Physical Activity: Not on file   Stress: Not on file   Social Connections: Not on file   Intimate Partner Violence: Not on file   Housing Stability: Not on file     Social History     Social History Narrative   • Not on file        Social History     Tobacco History     Smoking Status  Never    Smokeless Tobacco Use  Never          Alcohol History     Alcohol Use Status  Yes Comment  rare          Drug Use     Drug Use Status  No          Sexual Activity     Sexually Active  Not Asked          Activities of Daily Living    Not Asked                     OBJECTIVE:     Mental Status Evaluation:    Appearance age appropriate, casually dressed   Behavior pleasant, cooperative   Speech normal volume, normal pitch   Mood  stable mood   Affect  bright affect   Thought Processes logical   Associations intact associations   Thought Content  no overt delusions   Perceptual Disturbances: None   Abnormal Thoughts  Risk Potential Suicidal ideation - None  Homicidal ideation - None  Potential for aggression - No   Orientation oriented to person, place, time/date and situation   Memory recent and remote memory grossly intact   Cosciousness alert and awake   Attention Span attention span and concentration are age appropriate   Intellect Good   Insight Good   Judgement Good   Muscle Strength and  Gait muscle strength and tone were normal   Language no difficulty naming common objects   Fund of Knowledge displays adequate knowledge of current events   Pain None   Pain Scale 0       Assessment/Plan:       Diagnoses and all orders for this visit:    Moderate episode of recurrent major depressive disorder (HCC)  -     buPROPion (WELLBUTRIN SR) 100 mg 12 hr tablet; 3 Daily  -     escitalopram (LEXAPRO) 5 mg tablet; Take 1 tablet (5 mg total) by mouth daily    LORAINE (generalized anxiety disorder)  -     clonazePAM (KlonoPIN) 0 5 mg tablet; 1 HS    Bipolar I disorder with depression (HCC)  -     traZODone (DESYREL) 100 mg tablet; 1/2-2 HS          Treatment Recommendations/Precautions:    Continue current medications:  Trazodone 100 mg, may take 1/2 tablet to 2 tabs at bedtime  Klonopin 0 5 mg at bedtime  Wellbutrin  mg, take 3 tabs daily  Lexapro 5 mg daily  Gabapentin 400 mg 3 times daily  Atarax 25 mg every 6 hours as needed for anxiety  Follow-up with me in 3 months or sooner if necessary  Risks/Benefits      Risks, Benefits And Possible Side Effects Of Medications:    Risks, benefits, and possible side effects of medications explained to patient and patient verbalizes understanding and agreement for treatment  Controlled Medication Discussion: History of any overuse or abuse of controlled substances  No evidence of dependence or addiction  Psychotherapy Provided:     Individual psychotherapy provided: No   The visit started at 1030 and was ended at 1050  We did review the treatment plan, we reviewed patient's medications  No new clinical concerns  Time was spent completing the progress note

## 2023-06-30 ENCOUNTER — APPOINTMENT (OUTPATIENT)
Dept: LAB | Facility: CLINIC | Age: 63
End: 2023-06-30
Payer: COMMERCIAL

## 2023-06-30 DIAGNOSIS — Z79.4 TYPE 2 DIABETES MELLITUS WITHOUT COMPLICATION, WITH LONG-TERM CURRENT USE OF INSULIN (HCC): ICD-10-CM

## 2023-06-30 DIAGNOSIS — E11.9 TYPE 2 DIABETES MELLITUS WITHOUT COMPLICATION, WITH LONG-TERM CURRENT USE OF INSULIN (HCC): ICD-10-CM

## 2023-06-30 DIAGNOSIS — E53.8 BIOTIN-(PROPIONYL-COA-CARBOXYLASE) LIGASE DEFICIENCY: ICD-10-CM

## 2023-06-30 DIAGNOSIS — E61.1 IRON DEFICIENCY: ICD-10-CM

## 2023-06-30 LAB
ALBUMIN SERPL BCP-MCNC: 3.9 G/DL (ref 3.5–5)
ALP SERPL-CCNC: 92 U/L (ref 46–116)
ALT SERPL W P-5'-P-CCNC: 29 U/L (ref 12–78)
ANION GAP SERPL CALCULATED.3IONS-SCNC: 5 MMOL/L
AST SERPL W P-5'-P-CCNC: 19 U/L (ref 5–45)
BILIRUB SERPL-MCNC: 0.67 MG/DL (ref 0.2–1)
BUN SERPL-MCNC: 12 MG/DL (ref 5–25)
CALCIUM SERPL-MCNC: 9.9 MG/DL (ref 8.3–10.1)
CHLORIDE SERPL-SCNC: 112 MMOL/L (ref 96–108)
CHOLEST SERPL-MCNC: 119 MG/DL
CO2 SERPL-SCNC: 27 MMOL/L (ref 21–32)
CREAT SERPL-MCNC: 1.07 MG/DL (ref 0.6–1.3)
ERYTHROCYTE [DISTWIDTH] IN BLOOD BY AUTOMATED COUNT: 15.6 % (ref 11.6–15.1)
EST. AVERAGE GLUCOSE BLD GHB EST-MCNC: 114 MG/DL
FERRITIN SERPL-MCNC: 6 NG/ML (ref 11–307)
GFR SERPL CREATININE-BSD FRML MDRD: 54 ML/MIN/1.73SQ M
GLUCOSE P FAST SERPL-MCNC: 103 MG/DL (ref 65–99)
HBA1C MFR BLD: 5.6 %
HCT VFR BLD AUTO: 37.2 % (ref 34.8–46.1)
HDLC SERPL-MCNC: 45 MG/DL
HGB BLD-MCNC: 11.1 G/DL (ref 11.5–15.4)
IRON SATN MFR SERPL: 6 % (ref 15–50)
IRON SERPL-MCNC: 26 UG/DL (ref 50–170)
LDLC SERPL CALC-MCNC: 53 MG/DL (ref 0–100)
MCH RBC QN AUTO: 22.5 PG (ref 26.8–34.3)
MCHC RBC AUTO-ENTMCNC: 29.8 G/DL (ref 31.4–37.4)
MCV RBC AUTO: 76 FL (ref 82–98)
NONHDLC SERPL-MCNC: 74 MG/DL
PLATELET # BLD AUTO: 171 THOUSANDS/UL (ref 149–390)
POTASSIUM SERPL-SCNC: 4.3 MMOL/L (ref 3.5–5.3)
PROT SERPL-MCNC: 7.4 G/DL (ref 6.4–8.4)
RBC # BLD AUTO: 4.93 MILLION/UL (ref 3.81–5.12)
SODIUM SERPL-SCNC: 144 MMOL/L (ref 135–147)
TIBC SERPL-MCNC: 424 UG/DL (ref 250–450)
TRIGL SERPL-MCNC: 107 MG/DL
VIT B12 SERPL-MCNC: 332 PG/ML (ref 180–914)
WBC # BLD AUTO: 4.9 THOUSAND/UL (ref 4.31–10.16)

## 2023-06-30 PROCEDURE — 83036 HEMOGLOBIN GLYCOSYLATED A1C: CPT

## 2023-06-30 PROCEDURE — 85027 COMPLETE CBC AUTOMATED: CPT

## 2023-06-30 PROCEDURE — 82607 VITAMIN B-12: CPT

## 2023-06-30 PROCEDURE — 82728 ASSAY OF FERRITIN: CPT

## 2023-06-30 PROCEDURE — 80061 LIPID PANEL: CPT

## 2023-06-30 PROCEDURE — 83550 IRON BINDING TEST: CPT

## 2023-06-30 PROCEDURE — 83540 ASSAY OF IRON: CPT

## 2023-06-30 PROCEDURE — 36415 COLL VENOUS BLD VENIPUNCTURE: CPT

## 2023-06-30 PROCEDURE — 80053 COMPREHEN METABOLIC PANEL: CPT

## 2023-07-06 ENCOUNTER — OFFICE VISIT (OUTPATIENT)
Dept: PSYCHIATRY | Facility: CLINIC | Age: 63
End: 2023-07-06
Payer: COMMERCIAL

## 2023-07-06 DIAGNOSIS — F31.9 BIPOLAR DISORDER WITH DEPRESSION (HCC): ICD-10-CM

## 2023-07-06 DIAGNOSIS — F31.9 BIPOLAR I DISORDER WITH DEPRESSION (HCC): ICD-10-CM

## 2023-07-06 PROCEDURE — 99214 OFFICE O/P EST MOD 30 MIN: CPT | Performed by: NURSE PRACTITIONER

## 2023-07-06 RX ORDER — GABAPENTIN 400 MG/1
400 CAPSULE ORAL 3 TIMES DAILY
Qty: 270 CAPSULE | Refills: 2 | Status: SHIPPED | OUTPATIENT
Start: 2023-07-06

## 2023-07-06 NOTE — PSYCH
PROGRESS NOTE        [facility]      Name and Date of Birth:  Nataliia Emerson 58 y.o. 1960    Date of Visit: 07/06/23    SUBJECTIVE: The patient is a 59-year-old female has a history of bipolar depressive disorder. Over the last several months, she has successfully come off of Klonopin. Not using it at all. Her mood is stable. No depression, no mood instability and no anxiety. She reports good sleep and good appetite. She enjoys her children and grandchildren and stays active with them. She will continue on her current medication regimen and follow-up with me will be in 4 months or sooner if necessary. She denies suicidal ideation, intent or plan at present, has no suicidal ideation, intent or plan at present. She denies any auditory hallucinations and visual hallucinations, denies any other delusional thinking, denies any delusional thinking. She denies any side effects from medications  . HPI ROS Appetite Changes and Sleep: normal appetite, normal sleep    Review Of Systems:      Constitutional Negative   ENT Negative   Cardiovascular Negative   Respiratory As Noted in HPI   Gastrointestinal Negative   Genitourinary Negative   Musculoskeletal Negative   Integumentary Negative   Neurological Negative   Endocrine Negative   Other Symptoms Negative and None       Laboratory Results: No results found for this or any previous visit.     Substance Abuse History:    Social History     Substance and Sexual Activity   Drug Use No       Family Psychiatric History:     Family History   Problem Relation Age of Onset   • Asthma Mother    • No Known Problems Father    • Rectal cancer Sister 62   • No Known Problems Daughter    • No Known Problems Maternal Grandmother    • No Known Problems Maternal Grandfather    • No Known Problems Paternal Grandmother    • No Known Problems Paternal Grandfather    • No Known Problems Paternal Aunt        The following portions of the patient's history were reviewed and updated as appropriate: past family history, past medical history, past social history, past surgical history and problem list.    Social History     Socioeconomic History   • Marital status: /Civil Union     Spouse name: Not on file   • Number of children: Not on file   • Years of education: Not on file   • Highest education level: Not on file   Occupational History   • Not on file   Tobacco Use   • Smoking status: Never   • Smokeless tobacco: Never   Vaping Use   • Vaping Use: Never used   Substance and Sexual Activity   • Alcohol use: Yes     Comment: rare   • Drug use: No   • Sexual activity: Not on file   Other Topics Concern   • Not on file   Social History Narrative   • Not on file     Social Determinants of Health     Financial Resource Strain: Not on file   Food Insecurity: Not on file   Transportation Needs: Not on file   Physical Activity: Not on file   Stress: Not on file   Social Connections: Not on file   Intimate Partner Violence: Not on file   Housing Stability: Not on file     Social History     Social History Narrative   • Not on file        Social History     Tobacco History     Smoking Status  Never    Smokeless Tobacco Use  Never          Alcohol History     Alcohol Use Status  Yes Comment  rare          Drug Use     Drug Use Status  No          Sexual Activity     Sexually Active  Not Asked          Activities of Daily Living    Not Asked                     OBJECTIVE:     Mental Status Evaluation:    Appearance age appropriate, casually dressed   Behavior pleasant, cooperative   Speech normal volume, normal pitch   Mood  stable mood   Affect  bright affect   Thought Processes logical   Associations intact associations   Thought Content Normal   Perceptual Disturbances: None   Abnormal Thoughts  Risk Potential Suicidal ideation - None  Homicidal ideation - None  Potential for aggression - No   Orientation oriented to person, place, time/date and situation   Memory recent and remote memory grossly intact   Cosciousness alert and awake   Attention Span attention span and concentration are age appropriate   Intellect Appears to be of Average Intelligence   Insight Good   Judgement Good   Muscle Strength and  Gait muscle strength and tone were normal   Language no difficulty naming common objects   Fund of Knowledge displays adequate knowledge of current events   Pain None   Pain Scale 0       Assessment/Plan:       Diagnoses and all orders for this visit:    Bipolar I disorder with depression ([address])  -     gabapentin (NEURONTIN) 400 mg capsule; Take 1 capsule (400 mg total) by mouth 3 (three) times a day    Bipolar disorder with depression (HCC)  -     gabapentin (NEURONTIN) 400 mg capsule; Take 1 capsule (400 mg total) by mouth 3 (three) times a day          Treatment Recommendations/Precautions:    Continue current medications: Wellbutrin  mg, taking 3 tabs daily for a total dose of 300 mg daily  Lexapro 5 mg daily  Gabapentin 400 mg 3 times daily  Trazodone 100 mg, taking 1/2-2 tabs at bedtime  Follow-up with me in 4 months or sooner if necessary. Risks/Benefits      Risks, Benefits And Possible Side Effects Of Medications:    Risks, benefits, and possible side effects of medications explained to patient and patient verbalizes understanding and agreement for treatment. Controlled Medication Discussion:     Not applicable    Psychotherapy Provided:     Individual psychotherapy provided: No.  The visit started at 11 AM and ended at 11:20 AM.  Time was spent reviewing the treatment plan, reviewing medications and completing the progress note.

## 2023-11-03 ENCOUNTER — APPOINTMENT (OUTPATIENT)
Dept: LAB | Facility: CLINIC | Age: 63
End: 2023-11-03
Payer: COMMERCIAL

## 2023-11-03 DIAGNOSIS — E61.1 IRON DEFICIENCY: ICD-10-CM

## 2023-11-03 DIAGNOSIS — E11.9 TYPE 2 DIABETES MELLITUS WITHOUT COMPLICATION, WITHOUT LONG-TERM CURRENT USE OF INSULIN (HCC): ICD-10-CM

## 2023-11-03 DIAGNOSIS — E53.8 BIOTIN-(PROPIONYL-COA-CARBOXYLASE) LIGASE DEFICIENCY: ICD-10-CM

## 2023-11-03 LAB
ALBUMIN SERPL BCP-MCNC: 4.2 G/DL (ref 3.5–5)
ALP SERPL-CCNC: 75 U/L (ref 34–104)
ALT SERPL W P-5'-P-CCNC: 17 U/L (ref 7–52)
ANION GAP SERPL CALCULATED.3IONS-SCNC: 9 MMOL/L
AST SERPL W P-5'-P-CCNC: 20 U/L (ref 13–39)
BILIRUB SERPL-MCNC: 0.74 MG/DL (ref 0.2–1)
BUN SERPL-MCNC: 12 MG/DL (ref 5–25)
CALCIUM SERPL-MCNC: 9.9 MG/DL (ref 8.4–10.2)
CHLORIDE SERPL-SCNC: 106 MMOL/L (ref 96–108)
CHOLEST SERPL-MCNC: 123 MG/DL
CO2 SERPL-SCNC: 27 MMOL/L (ref 21–32)
CREAT SERPL-MCNC: 0.93 MG/DL (ref 0.6–1.3)
ERYTHROCYTE [DISTWIDTH] IN BLOOD BY AUTOMATED COUNT: 15 % (ref 11.6–15.1)
EST. AVERAGE GLUCOSE BLD GHB EST-MCNC: 117 MG/DL
FERRITIN SERPL-MCNC: 12 NG/ML (ref 11–307)
GFR SERPL CREATININE-BSD FRML MDRD: 65 ML/MIN/1.73SQ M
GLUCOSE P FAST SERPL-MCNC: 95 MG/DL (ref 65–99)
HBA1C MFR BLD: 5.7 %
HCT VFR BLD AUTO: 41.4 % (ref 34.8–46.1)
HDLC SERPL-MCNC: 45 MG/DL
HGB BLD-MCNC: 13.5 G/DL (ref 11.5–15.4)
IRON SATN MFR SERPL: 14 % (ref 15–50)
IRON SERPL-MCNC: 50 UG/DL (ref 50–212)
LDLC SERPL CALC-MCNC: 58 MG/DL (ref 0–100)
MCH RBC QN AUTO: 27.1 PG (ref 26.8–34.3)
MCHC RBC AUTO-ENTMCNC: 32.6 G/DL (ref 31.4–37.4)
MCV RBC AUTO: 83 FL (ref 82–98)
NONHDLC SERPL-MCNC: 78 MG/DL
PLATELET # BLD AUTO: 171 THOUSANDS/UL (ref 149–390)
POTASSIUM SERPL-SCNC: 4.2 MMOL/L (ref 3.5–5.3)
PROT SERPL-MCNC: 7 G/DL (ref 6.4–8.4)
RBC # BLD AUTO: 4.99 MILLION/UL (ref 3.81–5.12)
SODIUM SERPL-SCNC: 142 MMOL/L (ref 135–147)
TIBC SERPL-MCNC: 367 UG/DL (ref 250–450)
TRIGL SERPL-MCNC: 98 MG/DL
UIBC SERPL-MCNC: 317 UG/DL (ref 155–355)
VIT B12 SERPL-MCNC: 466 PG/ML (ref 180–914)
WBC # BLD AUTO: 4.89 THOUSAND/UL (ref 4.31–10.16)

## 2023-11-03 PROCEDURE — 82607 VITAMIN B-12: CPT

## 2023-11-03 PROCEDURE — 85027 COMPLETE CBC AUTOMATED: CPT

## 2023-11-03 PROCEDURE — 83036 HEMOGLOBIN GLYCOSYLATED A1C: CPT

## 2023-11-03 PROCEDURE — 80053 COMPREHEN METABOLIC PANEL: CPT

## 2023-11-03 PROCEDURE — 36415 COLL VENOUS BLD VENIPUNCTURE: CPT

## 2023-11-03 PROCEDURE — 83550 IRON BINDING TEST: CPT

## 2023-11-03 PROCEDURE — 83540 ASSAY OF IRON: CPT

## 2023-11-03 PROCEDURE — 82728 ASSAY OF FERRITIN: CPT

## 2023-11-03 PROCEDURE — 80061 LIPID PANEL: CPT

## 2023-11-08 ENCOUNTER — OFFICE VISIT (OUTPATIENT)
Dept: PSYCHIATRY | Facility: CLINIC | Age: 63
End: 2023-11-08
Payer: COMMERCIAL

## 2023-11-08 DIAGNOSIS — F39 MOOD DISORDER (HCC): Primary | ICD-10-CM

## 2023-11-08 DIAGNOSIS — F31.9 BIPOLAR I DISORDER WITH DEPRESSION (HCC): ICD-10-CM

## 2023-11-08 DIAGNOSIS — F31.9 BIPOLAR DISORDER WITH DEPRESSION (HCC): ICD-10-CM

## 2023-11-08 PROCEDURE — 99214 OFFICE O/P EST MOD 30 MIN: CPT | Performed by: NURSE PRACTITIONER

## 2023-11-08 PROCEDURE — 90833 PSYTX W PT W E/M 30 MIN: CPT | Performed by: NURSE PRACTITIONER

## 2023-11-08 RX ORDER — GABAPENTIN 400 MG/1
400 CAPSULE ORAL 2 TIMES DAILY
Qty: 270 CAPSULE | Refills: 2
Start: 2023-11-08

## 2023-11-08 RX ORDER — GABAPENTIN 100 MG/1
CAPSULE ORAL
Qty: 90 CAPSULE | Refills: 2 | Status: SHIPPED | OUTPATIENT
Start: 2023-11-08

## 2023-11-08 NOTE — PSYCH
PROGRESS NOTE        [facility]      Name and Date of Birth:  Stephen Ziegler 58 y.o. 1960    Date of Visit: 11/08/23    SUBJECTIVE: Patient is a 59-year-old female has a history of bipolar depressive disorder. On examination today, overall she is doing fairly well. However, she is reporting some difficulty with "word-finding". She has not misplaced items, and she has not forgotten names of friends or family. She has reported some difficulty finding the right phrase or word when in conversation. I did perform a Mini-Mental status exam and she scored 28 out of 30. So this could be med related. We are going to start to decrease gabapentin slightly and see how she does. We will start reducing the dose to 900 mg daily from 1200 mg daily. She is in agreement. She is sleeping well, eating well and feels well overall. Recent labs does show a low iron saturation. She is seeing her primary care doctor for the same. They have suggested possible infusions. She is considering that. Stays very active and social with her family. She denies suicidal ideation, intent or plan at present, has no suicidal ideation, intent or plan at present. She denies any auditory hallucinations and visual hallucinations, denies any other delusional thinking, denies any delusional thinking. She denies any side effects from medications  . HPI ROS Appetite Changes and Sleep: normal appetite, normal sleep    Review Of Systems:      Constitutional Negative   ENT Negative   Cardiovascular Negative   Respiratory As Noted in HPI   Gastrointestinal Negative   Genitourinary Negative   Musculoskeletal Negative   Integumentary Negative   Neurological Negative   Endocrine Negative   Other Symptoms Negative and None       Laboratory Results: No results found for this or any previous visit.     Substance Abuse History:    Social History     Substance and Sexual Activity   Drug Use No       Family Psychiatric History:     Family History   Problem Relation Age of Onset    Asthma Mother     No Known Problems Father     Rectal cancer Sister 62    No Known Problems Daughter     No Known Problems Maternal Grandmother     No Known Problems Maternal Grandfather     No Known Problems Paternal Grandmother     No Known Problems Paternal Grandfather     No Known Problems Paternal Aunt        The following portions of the patient's history were reviewed and updated as appropriate: past family history, past medical history, past social history, past surgical history and problem list.    Social History     Socioeconomic History    Marital status: /Civil Union     Spouse name: Not on file    Number of children: Not on file    Years of education: Not on file    Highest education level: Not on file   Occupational History    Not on file   Tobacco Use    Smoking status: Never    Smokeless tobacco: Never   Vaping Use    Vaping Use: Never used   Substance and Sexual Activity    Alcohol use: Yes     Comment: rare    Drug use: No    Sexual activity: Not on file   Other Topics Concern    Not on file   Social History Narrative    Not on file     Social Determinants of Health     Financial Resource Strain: Not on file   Food Insecurity: Not on file   Transportation Needs: Not on file   Physical Activity: Not on file   Stress: Not on file   Social Connections: Not on file   Intimate Partner Violence: Not on file   Housing Stability: Not on file     Social History     Social History Narrative    Not on file        Social History       Tobacco History       Smoking Status  Never      Smokeless Tobacco Use  Never              Alcohol History       Alcohol Use Status  Yes Comment  rare              Drug Use       Drug Use Status  No              Sexual Activity       Sexually Active  Not Asked              Activities of Daily Living    Not Asked                       OBJECTIVE:     Mental Status Evaluation:    Appearance age appropriate, casually dressed   Behavior pleasant, cooperative   Speech normal volume, normal pitch   Mood Stable mood. Affect Bright affect. Thought Processes Expressing some mild incident with word finding. Associations intact associations   Thought Content No overt delusions   Perceptual Disturbances: none   Abnormal Thoughts  Risk Potential Suicidal ideation - None  Homicidal ideation - None  Potential for aggression - No   Orientation oriented to person, place, time/date and situation   Memory Recently, reports some issues with word-finding. Could be med related. Cosciousness alert and awake   Attention Span attention span and concentration are age appropriate   Intellect Appears to be of Average Intelligence   Insight Good   Judgement Good   Muscle Strength and  Gait muscle strength and tone were normal   Language no difficulty naming common objects   Fund of Knowledge displays adequate knowledge of current events   Pain none   Pain Scale 0       Assessment/Plan:       Diagnoses and all orders for this visit:    Mood disorder (HCC)  -     gabapentin (Neurontin) 100 mg capsule; 2 caps in AM for 4 weeks, then 1 cap in AM    Bipolar disorder with depression (HCC)  -     gabapentin (NEURONTIN) 400 mg capsule; Take 1 capsule (400 mg total) by mouth 2 (two) times a day    Bipolar I disorder with depression (HCC)  -     gabapentin (NEURONTIN) 400 mg capsule; Take 1 capsule (400 mg total) by mouth 2 (two) times a day          Treatment Recommendations/Precautions:    Continue current medications:  Reduce gabapentin to 900 mg daily  Continue Wellbutrin  mg 3 times daily  Continue Lexapro 5 mg daily  Continue trazodone 100 mg, may take one half or whole tab at bedtime  Follow-up in January 2024.     Risks/Benefits      Risks, Benefits And Possible Side Effects Of Medications:    Risks, benefits, and possible side effects of medications explained to patient and patient verbalizes understanding and agreement for treatment. Controlled Medication Discussion:     Not applicable    Crisis plan: Patient is aware of the 24-hour on-call service offered by [facility]. Patient is also aware of the 24-hour crisis service offered by her [facility]. Psychotherapy Provided:     Individual psychotherapy provided: The visit started at 11 AM and ended at 11:35 AM. Time is spent reviewing the treatment plan, reviewing current symptoms, performing Mini-Mental status exam and completing the progress note.

## 2023-11-08 NOTE — PSYCH
TREATMENT PLAN (Medication Management Only)        [address]    Name and Date of Birth:  Bee Plasencia 58 y.o. 1960  Date of Treatment Plan: November 8, 2023  Diagnosis/Diagnoses:    1. Mood disorder ([address] [facility])    2. Bipolar disorder with depression ([address] [facility])    3. Bipolar I disorder with depression [facility])      Strengths/Personal Resources for Self-Care: supportive family, supportive friends. Area/Areas of need (in own words): " Complaining of some word-finding issues. Could be med related.". 1. Long Term Goal: " Continue to function at her current level of functioning. Continue to enjoy friends and family.". Target Date: 6 months - 5/8/2024  Person/Persons responsible for completion of goal: Marlen Newton  2. Short Term Objective (s) - How will we reach this goal?:   A. Provider new recommended medication/dosage changes and/or continue medication(s): continue all other medications. B.  N/A. Target Date: 6 months - 5/8/2024  Person/Persons Responsible for Completion of Goal: Marlen Newton  Progress Towards Goals: improving gradually  Treatment Modality: medication education at every visit  Review due 6 months from date of this plan: 6 months - 5/8/2024  Expected length of service: ongoing treatment unless revised  My Physician/PA/NP and I have developed this plan together and I agree to work on the goals and objectives. I understand the treatment goals that were developed for my treatment.

## 2024-01-11 ENCOUNTER — OFFICE VISIT (OUTPATIENT)
Dept: PSYCHIATRY | Facility: CLINIC | Age: 64
End: 2024-01-11
Payer: COMMERCIAL

## 2024-01-11 PROCEDURE — 99214 OFFICE O/P EST MOD 30 MIN: CPT | Performed by: NURSE PRACTITIONER

## 2024-01-11 NOTE — PSYCH
"    PROGRESS NOTE        [facility]      Name and Date of Birth:  Maia Noyola 63 y.o. 1960    Date of Visit: 01/11/24    SUBJECTIVE: Patient is a 63-year-old female has a history of bipolar depressive disorder.  Doing very well currently.  Had a raymundo Solomon with her family which includes her , children and grandchildren.  She is sleeping well and eating well.  She reports that she has had no symptoms of mood instability lability or tearful episodes.  Some stress over the [place] holidays but it was more related to \"getting ready for the holiday \".  No other new clinical issues or concerns were expressed by patient.  She will continue on this current medication regimen and follow-up with me in 3 months or sooner if necessary.  Safety plan: The patient is aware of the 24-hour on-call service offered by [facility].  Patient is also aware of the 24-hour crisis service offered by her [facility].  Patient is aware and agrees to go to the emergency room if she experiences any suicidal ideation.  She has a good support system in her  and her family.  She agrees with the safety plan.      She denies suicidal ideation, intent or plan at present, has no suicidal ideation, intent or plan at present.    She denies any auditory hallucinations and visual hallucinations, denies any other delusional thinking, denies any delusional thinking.    She denies any side effects from medications  .  HPI ROS Appetite Changes and Sleep: normal appetite, normal sleep    Review Of Systems:      Constitutional Negative   ENT Negative   Cardiovascular Negative   Respiratory As Noted in HPI   Gastrointestinal Negative   Genitourinary Negative   Musculoskeletal Negative   Integumentary Negative   Neurological Negative   Endocrine Negative   Other Symptoms Negative and None       Laboratory Results: No results found for this or any previous visit.    Substance Abuse " History:    Social History     Substance and Sexual Activity   Drug Use No       Family Psychiatric History:     Family History   Problem Relation Age of Onset    Asthma Mother     No Known Problems Father     Rectal cancer Sister 58    No Known Problems Daughter     No Known Problems Maternal Grandmother     No Known Problems Maternal Grandfather     No Known Problems Paternal Grandmother     No Known Problems Paternal Grandfather     No Known Problems Paternal Aunt        The following portions of the patient's history were reviewed and updated as appropriate: past family history, past medical history, past social history, past surgical history and problem list.    Social History     Socioeconomic History    Marital status: /Civil Union     Spouse name: Not on file    Number of children: Not on file    Years of education: Not on file    Highest education level: Not on file   Occupational History    Not on file   Tobacco Use    Smoking status: Never    Smokeless tobacco: Never   Vaping Use    Vaping status: Never Used   Substance and Sexual Activity    Alcohol use: Yes     Comment: rare    Drug use: No    Sexual activity: Not on file   Other Topics Concern    Not on file   Social History Narrative    Not on file     Social Determinants of Health     Financial Resource Strain: Not on file   Food Insecurity: Not on file   Transportation Needs: Not on file   Physical Activity: Not on file   Stress: Not on file   Social Connections: Not on file   Intimate Partner Violence: Not on file   Housing Stability: Not on file     Social History     Social History Narrative    Not on file        Social History       Tobacco History       Smoking Status  Never      Smokeless Tobacco Use  Never              Alcohol History       Alcohol Use Status  Yes Comment  rare              Drug Use       Drug Use Status  No              Sexual Activity       Sexually Active  Not Asked              Activities of Daily Living    Not  Asked                       OBJECTIVE:     Mental Status Evaluation:    Appearance age appropriate, casually dressed   Behavior pleasant, cooperative   Speech normal volume, normal pitch   Mood Stable mood   Affect Bright affect   Thought Processes logical   Associations intact associations   Thought Content No overt delusions   Perceptual Disturbances: none   Abnormal Thoughts  Risk Potential Suicidal ideation - None  Homicidal ideation - None  Potential for aggression - No   Orientation oriented to person, place, time/date and situation   Memory recent and remote memory grossly intact   Cosciousness alert and awake   Attention Span attention span and concentration are age appropriate   Intellect Appears to be of Average Intelligence   Insight Good   Judgement Good   Muscle Strength and  Gait muscle strength and tone were normal   Language no difficulty naming common objects   Fund of Knowledge displays adequate knowledge of current events   Pain none   Pain Scale 0       Assessment/Plan:       There are no diagnoses linked to this encounter.      Treatment Recommendations/Precautions:    Continue current medications:  Lexapro 5 mg daily  Gabapentin 400 mg twice daily  Gabapentin 100 mg daily  Trazodone 100 mg at bedtime as needed for sleep  Wellbutrin  mg, taking 3 tabs daily  Follow up with me in 3 months or sooner if necessary.    Risks/Benefits      Risks, Benefits And Possible Side Effects Of Medications:    Risks, benefits, and possible side effects of medications explained to patient and patient verbalizes understanding and agreement for treatment.    Controlled Medication Discussion:     Not applicable    Psychotherapy Provided:     Individual psychotherapy provided: No.  The visit started at noon and ended at 12:30 PM.  Time is spent reviewing the treatment plan, reviewing the patient's record, labs.  Time was also spent reviewing the record and the progress note.

## 2024-01-14 DIAGNOSIS — F33.1 MODERATE EPISODE OF RECURRENT MAJOR DEPRESSIVE DISORDER (HCC): ICD-10-CM

## 2024-01-15 RX ORDER — BUPROPION HYDROCHLORIDE 100 MG/1
TABLET, EXTENDED RELEASE ORAL
Qty: 270 TABLET | Refills: 3 | Status: SHIPPED | OUTPATIENT
Start: 2024-01-15

## 2024-01-15 RX ORDER — ESCITALOPRAM OXALATE 5 MG/1
5 TABLET ORAL DAILY
Qty: 90 TABLET | Refills: 3 | Status: SHIPPED | OUTPATIENT
Start: 2024-01-15

## 2024-02-29 ENCOUNTER — OFFICE VISIT (OUTPATIENT)
Age: 64
End: 2024-02-29
Payer: COMMERCIAL

## 2024-02-29 VITALS
SYSTOLIC BLOOD PRESSURE: 128 MMHG | WEIGHT: 199 LBS | RESPIRATION RATE: 18 BRPM | HEART RATE: 50 BPM | OXYGEN SATURATION: 98 % | DIASTOLIC BLOOD PRESSURE: 78 MMHG | HEIGHT: 64 IN | BODY MASS INDEX: 33.97 KG/M2

## 2024-02-29 DIAGNOSIS — K63.5 POLYP OF COLON, UNSPECIFIED PART OF COLON, UNSPECIFIED TYPE: ICD-10-CM

## 2024-02-29 DIAGNOSIS — K21.9 GASTROESOPHAGEAL REFLUX DISEASE, UNSPECIFIED WHETHER ESOPHAGITIS PRESENT: ICD-10-CM

## 2024-02-29 DIAGNOSIS — K58.1 IRRITABLE BOWEL SYNDROME WITH CONSTIPATION: Primary | ICD-10-CM

## 2024-02-29 DIAGNOSIS — Q43.8 REDUNDANT COLON: ICD-10-CM

## 2024-02-29 PROCEDURE — 99214 OFFICE O/P EST MOD 30 MIN: CPT | Performed by: INTERNAL MEDICINE

## 2024-02-29 RX ORDER — LINACLOTIDE 72 UG/1
72 CAPSULE, GELATIN COATED ORAL DAILY
Qty: 30 CAPSULE | Refills: 2 | Status: SHIPPED | OUTPATIENT
Start: 2024-02-29 | End: 2024-05-29

## 2024-02-29 RX ORDER — POLYETHYLENE GLYCOL 3350 17 G/17G
POWDER, FOR SOLUTION ORAL
COMMUNITY
Start: 2023-06-12

## 2024-02-29 RX ORDER — PNV NO.95/FERROUS FUM/FOLIC AC 28MG-0.8MG
TABLET ORAL
COMMUNITY
Start: 2023-11-01

## 2024-02-29 RX ORDER — ASCORBIC ACID 100 MG
TABLET,CHEWABLE ORAL
COMMUNITY
Start: 2023-11-01

## 2024-03-26 ENCOUNTER — OFFICE VISIT (OUTPATIENT)
Age: 64
End: 2024-03-26
Payer: COMMERCIAL

## 2024-03-26 ENCOUNTER — PREP FOR PROCEDURE (OUTPATIENT)
Age: 64
End: 2024-03-26

## 2024-03-26 VITALS
SYSTOLIC BLOOD PRESSURE: 108 MMHG | HEART RATE: 77 BPM | BODY MASS INDEX: 33.8 KG/M2 | WEIGHT: 198 LBS | HEIGHT: 64 IN | DIASTOLIC BLOOD PRESSURE: 74 MMHG | OXYGEN SATURATION: 98 % | RESPIRATION RATE: 18 BRPM

## 2024-03-26 DIAGNOSIS — K21.9 GASTROESOPHAGEAL REFLUX DISEASE WITHOUT ESOPHAGITIS: Primary | ICD-10-CM

## 2024-03-26 DIAGNOSIS — K58.1 IRRITABLE BOWEL SYNDROME WITH CONSTIPATION: ICD-10-CM

## 2024-03-26 DIAGNOSIS — R10.13 EPIGASTRIC PAIN: ICD-10-CM

## 2024-03-26 PROCEDURE — 99214 OFFICE O/P EST MOD 30 MIN: CPT | Performed by: INTERNAL MEDICINE

## 2024-03-26 NOTE — PROGRESS NOTES
[facility] - Outpatient Consultation  Maia Noyola 63 y.o. female MRN: [id number]  Encounter: [id number]          ASSESSMENT AND PLAN:      1. Gastroesophageal reflux disease without esophagitis  - Colonoscopy; Future  2. Epigastric pain  - Colonoscopy; Future  3. Irritable bowel syndrome with constipation  - polyethylene glycol (GOLYTELY) 4000 mL solution; Take 4,000 mL by mouth once for 1 dose Follow GI office instructions  Dispense: 4000 mL; Refill: 0  - EGD; Future    63-year-old female presenting for follow-up regarding IBS/C, redundant colon, GERD, new epigastric pain.     Bowel movements with Linzess and MiraLAX every other day but now with uncomfortable consistency of stools.  We discussed that we would continue Linzess 72 mcg and drop MiraLAX to half capful per day as being completely off MiraLAX she will then have constipation.    Given review of her last colonoscopy being incomplete in 2022 and not adequate for polyp detection, will plan for GoLytely bowel preparation and colonoscopy.  For chronic GERD and more recent development of epigastric pain, will plan for upper endoscopy at time of colonoscopy.    Follow-up after procedures  ______________________________________________________________________    HPI:   Evelia Noyola is a pleasant 63-year-old female presenting for follow-up regarding history of IBS with constipation, redundant colon, distant history of colon polyp, mild GERD.    Last office visit she was resumed on Linzess 72 mcg and has been taking MiraLAX every other day and fiber supplementation.  She is moving her bowels now which she is happy about although she is having urgency and mushy soft stools.  She is okay with this at times but it can be distressing when she is out.    Summary of HPI:  She was previously under the care of [facility], but is transitioning care as Dr. Kirby has moved from the area.  She has had chronic constipation likely  consistent with irritable bowel syndrome for the past over 3 years.  Of note she also has a history of colon polyp in the distant past.  By review of her records recent colonoscopy in 2022 was noted to have poor bowel prep and redundant colon.  She believes she had a follow-up colonoscopy 1 year later which was okay, but records for this are unavailable for review.    No significant family history of GI complaints.  She is on a number of medications that may be contributing to constipation including gabapentin, Razadyne.         REVIEW OF SYSTEMS:    CONSTITUTIONAL: Denies any fever, chills, rigors, and weight loss.  HEENT: Denies odynophagia, tinnitus  CARDIOVASCULAR: No chest pain or palpitations.   RESPIRATORY: Denies any cough, hemoptysis, shortness of breath or dyspnea on exertion.  GASTROINTESTINAL: As noted in the History of Present Illness.   GENITOURINARY: No problems with urination. Denies any hematuria or dysuria.  NEUROLOGIC: No dizziness or vertigo, denies headaches.   MUSCULOSKELETAL: Denies any muscle or joint pain.   SKIN: Denies skin rashes or itching.   ENDOCRINE:  Denies intolerance to heat or cold.  PSYCHOSOCIAL: Denies depression or anxiety. Denies any recent memory loss.       Historical Information   Past Medical History:   Diagnosis Date   • Anemia    • Anxiety    • Arthritis    • Back pain     Lower back   • Bipolar 1 disorder (HCC)    • Depression    • Diabetes (HCC)    • Diverticulosis    • Hip dysplasia     Bilateral   • Hyperlipidemia    • Knee pain, right    • Nasal septal perforation     following a septoplasty   • Obesity    • Psoriasis     Along hairline.   • Stress incontinence      Past Surgical History:   Procedure Laterality Date   • COLONOSCOPY     • DILATION AND CURETTAGE OF UTERUS      One D&C and one D&E   • ESOPHAGOGASTRODUODENOSCOPY     • NASAL SEPTUM SURGERY     • W[place] ARTHRS KNE SURG W/MENISCECTOMY MED/LAT W/SHVG Right 09/27/2018    Procedure: ARTHROSCOPY KNEE; PARTIAL  MEDIAL MENISECTOMY; CHONDROPLASTY;  Surgeon: Lien Contreras DO;  Location: [facility];  Service: Orthopedics   • TONSILECTOMY AND ADNOIDECTOMY       Social History   Social History     Substance and Sexual Activity   Alcohol Use Yes    Comment: rare     Social History     Substance and Sexual Activity   Drug Use No     Social History     Tobacco Use   Smoking Status Never   Smokeless Tobacco Never     Family History   Problem Relation Age of Onset   • Asthma Mother    • No Known Problems Father    • Rectal cancer Sister 58   • No Known Problems Daughter    • No Known Problems Maternal Grandmother    • No Known Problems Maternal Grandfather    • No Known Problems Paternal Grandmother    • No Known Problems Paternal Grandfather    • No Known Problems Paternal Aunt        Meds/Allergies       Current Outpatient Medications:   •  acetaminophen (TYLENOL) 500 mg tablet  •  Ascorbic Acid (Vitamin C) 100 MG CHEW  •  buPROPion (WELLBUTRIN SR) 100 mg 12 hr tablet  •  cyanocobalamin 1,000 mcg/mL  •  escitalopram (LEXAPRO) 5 mg tablet  •  Ferrous Sulfate (Iron) 325 (65 Fe) MG TABS  •  Fiber 500 MG CAPS  •  gabapentin (Neurontin) 100 mg capsule  •  gabapentin (NEURONTIN) 400 mg capsule  •  linaCLOtide (Linzess) 72 MCG CAPS  •  Multiple Vitamin (MULTIVITAMIN) capsule  •  oxybutynin (DITROPAN-XL) 10 MG 24 hr tablet  •  polyethylene glycol (GOLYTELY) 4000 mL solution  •  polyethylene glycol (MiraLax) 17 GM/SCOOP powder  •  rosuvastatin (CRESTOR) 10 MG tablet  •  traZODone (DESYREL) 100 mg tablet  •  Trulicity 0.75 MG/0.5ML SOPN    Allergies   Allergen Reactions   • Banana - Food Allergy Hives     Itching and swelling    • Crab (Diagnostic) - Food Allergy Hives   • Cleocin [Clindamycin]      Causes C Diff   • Latex      Pt told not to be in contact with latex because of banana allergy   • Penicillins Other (See Comments)     Causes vaginal itching and must take Diflucan with it           Objective     Blood pressure 108/74, pulse  "77, resp. rate 18, height 5' 4\" (1.626 m), weight 89.8 kg (198 lb), SpO2 98%. Body mass index is 33.99 kg/m².        PHYSICAL EXAM:      General Appearance:   Alert, cooperative, no distress   HEENT:   Normocephalic, atraumatic, anicteric.     Neck:  Supple, symmetrical, trachea midline   Lungs:   Clear to auscultation bilaterally; no rales, rhonchi or wheezing; respirations unlabored    Heart::   Regular rate and rhythm; no murmur, rub, or gallop.   Abdomen:   Soft, non-tender, non-distended; normal bowel sounds; no masses, no organomegaly    Genitalia:   Deferred    Rectal:   Deferred    Extremities:  No cyanosis, clubbing or edema    Pulses:  2+ and symmetric    Skin:  No jaundice, rashes, or lesions          Lab Results:   No visits with results within 1 Day(s) from this visit.   Latest known visit with results is:   Appointment on 11/03/2023   Component Date Value   • WBC 11/03/2023 4.89    • RBC 11/03/2023 4.99    • Hemoglobin 11/03/2023 13.5    • Hematocrit 11/03/2023 41.4    • MCV 11/03/2023 83    • MCH 11/03/2023 27.1    • MCHC 11/03/2023 32.6    • RDW 11/03/2023 15.0    • Platelets 11/03/2023 171    • Sodium 11/03/2023 142    • Potassium 11/03/2023 4.2    • Chloride 11/03/2023 106    • CO2 11/03/2023 27    • ANION GAP 11/03/2023 9    • BUN 11/03/2023 12    • Creatinine 11/03/2023 0.93    • Glucose, Fasting 11/03/2023 95    • Calcium 11/03/2023 9.9    • AST 11/03/2023 20    • ALT 11/03/2023 17    • Alkaline Phosphatase 11/03/2023 75    • Total Protein 11/03/2023 7.0    • Albumin 11/03/2023 4.2    • Total Bilirubin 11/03/2023 0.74    • eGFR 11/03/2023 65    • Hemoglobin A1C 11/03/2023 5.7 (H)    • EAG 11/03/2023 117    • Cholesterol 11/03/2023 123    • Triglycerides 11/03/2023 98    • HDL, Direct 11/03/2023 45 (L)    • LDL Calculated 11/03/2023 58    • Non-HDL-Chol (CHOL-HDL) 11/03/2023 78    • Vitamin B-12 11/03/2023 466    • Iron Saturation 11/03/2023 14 (L)    • TIBC 11/03/2023 367    • Iron 11/03/2023 50 "    • UIBC 11/03/2023 317    • Ferritin 11/03/2023 12          Radiology Results:   No results found.  Answers submitted by the patient for this visit:  Abdominal Pain Questionnaire (Submitted on 3/26/2024)  Chief Complaint: Abdominal pain  Progression since onset: resolved  anorexia: No  arthralgias: No  belching: No  constipation: Yes  diarrhea: No  dysuria: No  fever: No  flatus: No  frequency: No  headaches: No  hematochezia: No  hematuria: No  melena: No  myalgias: No  nausea: No  weight loss: No  vomiting: No  Aggravated by: nothing, eating  Relieved by: bowel movements  Diagnostic workup: GI consult

## 2024-04-11 ENCOUNTER — OFFICE VISIT (OUTPATIENT)
Dept: PSYCHIATRY | Facility: CLINIC | Age: 64
End: 2024-04-11

## 2024-04-11 NOTE — PSYCH
PROGRESS NOTE        [facility]      Name and Date of Birth:  Maia Noyola 63 y.o. 1960    Date of Visit: 04/11/24    SUBJECTIVE: Patient is a 63-year-old female who has a long history of treatment for bipolar depressive disorder.  Doing very well currently.  We have been slowly titrating her her dose of gabapentin down.  She has felt no difference with the reduction.  Her mood is stable.  She has energy and interest and motivation.  Today however, she is feeling ill from upper respiratory issues.  Tells me she feels better today than she did over the weekend.  She is sleeping well and eating well and offering no other complaints.  Continues to follow-up on a regular basis with her family medical provider.  Follow-up with me in 3 months or sooner if necessary.      She denies suicidal ideation, intent or plan at present, has no suicidal ideation, intent or plan at present.    She denies any auditory hallucinations and visual hallucinations, denies any other delusional thinking, denies any delusional thinking.    She denies any side effects from medications  .  HPI ROS Appetite Changes and Sleep: normal appetite, normal sleep    Review Of Systems:      Constitutional Negative   ENT Negative   Cardiovascular Negative   Respiratory As Noted in HPI   Gastrointestinal Negative   Genitourinary Negative   Musculoskeletal Negative   Integumentary Negative   Neurological Negative   Endocrine Negative   Other Symptoms Negative and None       Laboratory Results: No results found for this or any previous visit.    Substance Abuse History:    Social History     Substance and Sexual Activity   Drug Use No       Family Psychiatric History:     Family History   Problem Relation Age of Onset    Asthma Mother     No Known Problems Father     Rectal cancer Sister 58    No Known Problems Daughter     No Known Problems Maternal Grandmother     No Known Problems Maternal  Grandfather     No Known Problems Paternal Grandmother     No Known Problems Paternal Grandfather     No Known Problems Paternal Aunt        The following portions of the patient's history were reviewed and updated as appropriate: past family history, past medical history, past social history, past surgical history and problem list.    Social History     Socioeconomic History    Marital status: /Civil Union     Spouse name: Not on file    Number of children: Not on file    Years of education: Not on file    Highest education level: Not on file   Occupational History    Not on file   Tobacco Use    Smoking status: Never    Smokeless tobacco: Never   Vaping Use    Vaping status: Never Used   Substance and Sexual Activity    Alcohol use: Yes     Comment: rare    Drug use: No    Sexual activity: Not on file   Other Topics Concern    Not on file   Social History Narrative    Not on file     Social Determinants of Health     Financial Resource Strain: Not on file   Food Insecurity: Not on file   Transportation Needs: Not on file   Physical Activity: Not on file   Stress: Not on file   Social Connections: Not on file   Intimate Partner Violence: Not on file   Housing Stability: Not on file     Social History     Social History Narrative    Not on file        Social History       Tobacco History       Smoking Status  Never      Smokeless Tobacco Use  Never              Alcohol History       Alcohol Use Status  Yes Comment  rare              Drug Use       Drug Use Status  No              Sexual Activity       Sexually Active  Not Asked              Activities of Daily Living    Not Asked                       OBJECTIVE:     Mental Status Evaluation:    Appearance age appropriate, casually dressed   Behavior pleasant, cooperative   Speech normal volume, normal pitch   Mood Stable mood   Affect Bright affect   Thought Processes logical   Associations intact associations   Thought Content No overt delusions   Perceptual  Disturbances: none   Abnormal Thoughts  Risk Potential Suicidal ideation - None  Homicidal ideation - None  Potential for aggression - No   Orientation oriented to person, place, time/date and situation   Memory recent and remote memory grossly intact   Cosciousness alert and awake   Attention Span attention span and concentration are age appropriate   Intellect Good   Insight Good   Judgement Good   Muscle Strength and  Gait muscle strength and tone were normal   Language no difficulty naming common objects   Fund of Knowledge displays adequate knowledge of current events   Pain none   Pain Scale 0       Assessment/Plan:       There are no diagnoses linked to this encounter.      Treatment Recommendations/Precautions:    Continue current medications:  Reduce gabapentin to 400 mg twice daily  Wellbutrin  mg daily  Trazodone 100 mg, may take one half and go up to 2 tablets at bedtime as needed  Lexapro 5 mg daily  Follow-up with me in 3 months or sooner if necessary.      Safety plan: Patient is aware of the 24-hour on-call service offered by [facility].  Patient is also aware of the 24-hour crisis call service offered by [facility] [place], [place].  Patient has a very good support system with her significant other.  The patient agrees to call 911 or go directly to the emergency room if she begins to experience any suicidal ideation.     Risks/Benefits      Risks, Benefits And Possible Side Effects Of Medications:    Risks, benefits, and possible side effects of medications explained to patient and patient verbalizes understanding and agreement for treatment.    Controlled Medication Discussion:     Not applicable    Psychotherapy Provided:     Individual psychotherapy provided: No

## 2024-04-17 ENCOUNTER — HOSPITAL ENCOUNTER (OUTPATIENT)
Dept: MAMMOGRAPHY | Facility: HOSPITAL | Age: 64
Discharge: HOME | End: 2024-04-17
Attending: FAMILY MEDICINE
Payer: COMMERCIAL

## 2024-04-17 VITALS — BODY MASS INDEX: 33.12 KG/M2 | WEIGHT: 194 LBS | HEIGHT: 64 IN

## 2024-04-17 DIAGNOSIS — Z12.31 ENCOUNTER FOR SCREENING MAMMOGRAM FOR BREAST CANCER: ICD-10-CM

## 2024-04-17 PROCEDURE — 77063 BREAST TOMOSYNTHESIS BI: CPT

## 2024-04-17 PROCEDURE — 77067 SCR MAMMO BI INCL CAD: CPT

## 2024-04-18 ENCOUNTER — APPOINTMENT (OUTPATIENT)
Dept: LAB | Facility: CLINIC | Age: 64
End: 2024-04-18
Payer: COMMERCIAL

## 2024-04-18 DIAGNOSIS — E11.9 TYPE 2 DIABETES MELLITUS WITHOUT COMPLICATION, WITHOUT LONG-TERM CURRENT USE OF INSULIN (HCC): ICD-10-CM

## 2024-04-18 DIAGNOSIS — D50.9 IRON DEFICIENCY ANEMIA, UNSPECIFIED IRON DEFICIENCY ANEMIA TYPE: ICD-10-CM

## 2024-04-18 LAB
ALBUMIN SERPL BCP-MCNC: 4.1 G/DL (ref 3.5–5)
ALP SERPL-CCNC: 69 U/L (ref 34–104)
ALT SERPL W P-5'-P-CCNC: 16 U/L (ref 7–52)
ANION GAP SERPL CALCULATED.3IONS-SCNC: 9 MMOL/L (ref 4–13)
AST SERPL W P-5'-P-CCNC: 19 U/L (ref 13–39)
BILIRUB SERPL-MCNC: 0.6 MG/DL (ref 0.2–1)
BUN SERPL-MCNC: 14 MG/DL (ref 5–25)
CALCIUM SERPL-MCNC: 9.9 MG/DL (ref 8.4–10.2)
CHLORIDE SERPL-SCNC: 107 MMOL/L (ref 96–108)
CHOLEST SERPL-MCNC: 112 MG/DL
CO2 SERPL-SCNC: 27 MMOL/L (ref 21–32)
CREAT SERPL-MCNC: 0.8 MG/DL (ref 0.6–1.3)
ERYTHROCYTE [DISTWIDTH] IN BLOOD BY AUTOMATED COUNT: 13.8 % (ref 11.6–15.1)
FERRITIN SERPL-MCNC: 31 NG/ML (ref 11–307)
GFR SERPL CREATININE-BSD FRML MDRD: 78 ML/MIN/1.73SQ M
GLUCOSE P FAST SERPL-MCNC: 110 MG/DL (ref 65–99)
HCT VFR BLD AUTO: 44.1 % (ref 34.8–46.1)
HDLC SERPL-MCNC: 40 MG/DL
HGB BLD-MCNC: 14.5 G/DL (ref 11.5–15.4)
IRON SATN MFR SERPL: 22 % (ref 15–50)
IRON SERPL-MCNC: 68 UG/DL (ref 50–212)
LDLC SERPL CALC-MCNC: 47 MG/DL (ref 0–100)
MCH RBC QN AUTO: 28.7 PG (ref 26.8–34.3)
MCHC RBC AUTO-ENTMCNC: 32.9 G/DL (ref 31.4–37.4)
MCV RBC AUTO: 87 FL (ref 82–98)
NONHDLC SERPL-MCNC: 72 MG/DL
PLATELET # BLD AUTO: 195 THOUSANDS/UL (ref 149–390)
PMV BLD AUTO: 12.6 FL (ref 8.9–12.7)
POTASSIUM SERPL-SCNC: 4.1 MMOL/L (ref 3.5–5.3)
PROT SERPL-MCNC: 6.9 G/DL (ref 6.4–8.4)
RBC # BLD AUTO: 5.06 MILLION/UL (ref 3.81–5.12)
SODIUM SERPL-SCNC: 143 MMOL/L (ref 135–147)
TIBC SERPL-MCNC: 308 UG/DL (ref 250–450)
TRIGL SERPL-MCNC: 123 MG/DL
UIBC SERPL-MCNC: 240 UG/DL (ref 155–355)
WBC # BLD AUTO: 4.7 THOUSAND/UL (ref 4.31–10.16)

## 2024-04-18 PROCEDURE — 36415 COLL VENOUS BLD VENIPUNCTURE: CPT

## 2024-04-18 PROCEDURE — 83550 IRON BINDING TEST: CPT

## 2024-04-18 PROCEDURE — 85027 COMPLETE CBC AUTOMATED: CPT

## 2024-04-18 PROCEDURE — 83036 HEMOGLOBIN GLYCOSYLATED A1C: CPT

## 2024-04-18 PROCEDURE — 80053 COMPREHEN METABOLIC PANEL: CPT

## 2024-04-18 PROCEDURE — 83540 ASSAY OF IRON: CPT

## 2024-04-18 PROCEDURE — 80061 LIPID PANEL: CPT

## 2024-04-18 PROCEDURE — 82728 ASSAY OF FERRITIN: CPT

## 2024-04-19 LAB
EST. AVERAGE GLUCOSE BLD GHB EST-MCNC: 100 MG/DL
HBA1C MFR BLD: 5.1 %

## 2024-04-28 ENCOUNTER — TELEPHONE (OUTPATIENT)
Dept: OTHER | Facility: OTHER | Age: 64
End: 2024-04-28

## 2024-04-28 NOTE — TELEPHONE ENCOUNTER
"Pt stated, \" No one has called me with an arrival time for my appointment.\"    Pt provided with appointment time.  "

## 2024-04-29 ENCOUNTER — ANESTHESIA (OUTPATIENT)
Dept: GASTROENTEROLOGY | Facility: HOSPITAL | Age: 64
End: 2024-04-29

## 2024-04-29 ENCOUNTER — ANESTHESIA EVENT (OUTPATIENT)
Dept: GASTROENTEROLOGY | Facility: HOSPITAL | Age: 64
End: 2024-04-29

## 2024-04-29 ENCOUNTER — HOSPITAL ENCOUNTER (OUTPATIENT)
Dept: GASTROENTEROLOGY | Facility: HOSPITAL | Age: 64
Setting detail: OUTPATIENT SURGERY
Discharge: HOME | End: 2024-04-29
Attending: INTERNAL MEDICINE
Payer: COMMERCIAL

## 2024-04-29 VITALS
HEIGHT: 64 IN | BODY MASS INDEX: 32.95 KG/M2 | RESPIRATION RATE: 16 BRPM | HEART RATE: 70 BPM | SYSTOLIC BLOOD PRESSURE: 107 MMHG | WEIGHT: 193 LBS | TEMPERATURE: 97.6 F | OXYGEN SATURATION: 95 % | DIASTOLIC BLOOD PRESSURE: 53 MMHG

## 2024-04-29 DIAGNOSIS — K21.9 GASTROESOPHAGEAL REFLUX DISEASE WITHOUT ESOPHAGITIS: ICD-10-CM

## 2024-04-29 DIAGNOSIS — R10.13 EPIGASTRIC PAIN: ICD-10-CM

## 2024-04-29 DIAGNOSIS — K58.1 IRRITABLE BOWEL SYNDROME WITH CONSTIPATION: ICD-10-CM

## 2024-04-29 PROBLEM — E11.9 DIABETES MELLITUS (HCC): Status: ACTIVE | Noted: 2024-04-29

## 2024-04-29 PROBLEM — F31.9 BIPOLAR 1 DISORDER (HCC): Status: ACTIVE | Noted: 2024-04-29

## 2024-04-29 PROBLEM — E66.9 OBESITY: Status: ACTIVE | Noted: 2024-04-29

## 2024-04-29 LAB — GLUCOSE SERPL-MCNC: 92 MG/DL (ref 65–140)

## 2024-04-29 PROCEDURE — 45378 DIAGNOSTIC COLONOSCOPY: CPT | Performed by: INTERNAL MEDICINE

## 2024-04-29 PROCEDURE — 88305 TISSUE EXAM BY PATHOLOGIST: CPT | Performed by: PATHOLOGY

## 2024-04-29 PROCEDURE — 43239 EGD BIOPSY SINGLE/MULTIPLE: CPT | Performed by: INTERNAL MEDICINE

## 2024-04-29 PROCEDURE — 82948 REAGENT STRIP/BLOOD GLUCOSE: CPT

## 2024-04-29 PROCEDURE — 88342 IMHCHEM/IMCYTCHM 1ST ANTB: CPT | Performed by: PATHOLOGY

## 2024-04-29 RX ORDER — PROPOFOL 10 MG/ML
INJECTION, EMULSION INTRAVENOUS AS NEEDED
Status: DISCONTINUED | OUTPATIENT
Start: 2024-04-29 | End: 2024-04-29

## 2024-04-29 RX ORDER — SODIUM CHLORIDE, SODIUM LACTATE, POTASSIUM CHLORIDE, CALCIUM CHLORIDE 600; 310; 30; 20 MG/100ML; MG/100ML; MG/100ML; MG/100ML
20 INJECTION, SOLUTION INTRAVENOUS CONTINUOUS
Status: DISCONTINUED | OUTPATIENT
Start: 2024-04-29 | End: 2024-05-03 | Stop reason: HOSPADM

## 2024-04-29 RX ORDER — SODIUM CHLORIDE, SODIUM LACTATE, POTASSIUM CHLORIDE, CALCIUM CHLORIDE 600; 310; 30; 20 MG/100ML; MG/100ML; MG/100ML; MG/100ML
50 INJECTION, SOLUTION INTRAVENOUS CONTINUOUS
Status: CANCELLED | OUTPATIENT
Start: 2024-04-29

## 2024-04-29 RX ORDER — SODIUM CHLORIDE, SODIUM LACTATE, POTASSIUM CHLORIDE, CALCIUM CHLORIDE 600; 310; 30; 20 MG/100ML; MG/100ML; MG/100ML; MG/100ML
50 INJECTION, SOLUTION INTRAVENOUS CONTINUOUS
Status: DISCONTINUED | OUTPATIENT
Start: 2024-04-29 | End: 2024-05-03 | Stop reason: HOSPADM

## 2024-04-29 RX ORDER — SODIUM CHLORIDE, SODIUM LACTATE, POTASSIUM CHLORIDE, CALCIUM CHLORIDE 600; 310; 30; 20 MG/100ML; MG/100ML; MG/100ML; MG/100ML
INJECTION, SOLUTION INTRAVENOUS CONTINUOUS PRN
Status: DISCONTINUED | OUTPATIENT
Start: 2024-04-29 | End: 2024-04-29

## 2024-04-29 RX ORDER — LIDOCAINE HYDROCHLORIDE 10 MG/ML
0.5 INJECTION, SOLUTION EPIDURAL; INFILTRATION; INTRACAUDAL; PERINEURAL ONCE AS NEEDED
Status: CANCELLED | OUTPATIENT
Start: 2024-04-29

## 2024-04-29 RX ORDER — GLYCOPYRROLATE 0.2 MG/ML
INJECTION INTRAMUSCULAR; INTRAVENOUS AS NEEDED
Status: DISCONTINUED | OUTPATIENT
Start: 2024-04-29 | End: 2024-04-29

## 2024-04-29 RX ORDER — PROPOFOL 10 MG/ML
INJECTION, EMULSION INTRAVENOUS CONTINUOUS PRN
Status: DISCONTINUED | OUTPATIENT
Start: 2024-04-29 | End: 2024-04-29

## 2024-04-29 RX ORDER — LIDOCAINE HYDROCHLORIDE 10 MG/ML
0.5 INJECTION, SOLUTION EPIDURAL; INFILTRATION; INTRACAUDAL; PERINEURAL ONCE AS NEEDED
Status: DISCONTINUED | OUTPATIENT
Start: 2024-04-29 | End: 2024-05-03 | Stop reason: HOSPADM

## 2024-04-29 RX ADMIN — PROPOFOL 100 MCG/KG/MIN: 10 INJECTION, EMULSION INTRAVENOUS at 07:36

## 2024-04-29 RX ADMIN — PROPOFOL 50 MG: 10 INJECTION, EMULSION INTRAVENOUS at 07:36

## 2024-04-29 RX ADMIN — PROPOFOL 50 MG: 10 INJECTION, EMULSION INTRAVENOUS at 07:33

## 2024-04-29 RX ADMIN — GLYCOPYRROLATE 0.2 MG: 0.2 INJECTION INTRAMUSCULAR; INTRAVENOUS at 07:31

## 2024-04-29 RX ADMIN — SODIUM CHLORIDE, SODIUM LACTATE, POTASSIUM CHLORIDE, AND CALCIUM CHLORIDE 50 ML/HR: .6; .31; .03; .02 INJECTION, SOLUTION INTRAVENOUS at 07:22

## 2024-04-29 RX ADMIN — SODIUM CHLORIDE, SODIUM LACTATE, POTASSIUM CHLORIDE, AND CALCIUM CHLORIDE: .6; .31; .03; .02 INJECTION, SOLUTION INTRAVENOUS at 07:30

## 2024-04-29 RX ADMIN — PROPOFOL 100 MG: 10 INJECTION, EMULSION INTRAVENOUS at 07:31

## 2024-04-29 NOTE — ANESTHESIA POSTPROCEDURE EVALUATION
Post-Op Assessment Note    CV Status:  Stable  Pain Score: 0    Pain management: adequate       Mental Status:  Arousable   Hydration Status:  Stable   PONV Controlled:  None   Airway Patency:  Patent     Post Op Vitals Reviewed: Yes    No anethesia notable event occurred.    Staff: CRNA               BP   102/51   Temp      Pulse  76   Resp   16   SpO2   92%

## 2024-04-29 NOTE — ANESTHESIA PREPROCEDURE EVALUATION
Procedure:  COLONOSCOPY  EGD    Relevant Problems   Endocrine   (+) Diabetes mellitus (HCC)      Behavioral Health   (+) Bipolar 1 disorder (HCC)      Other   (+) Obesity      Confirmed NPO appropriate    Last took Trulicity 2 weeks ago    Physical Exam    Airway    Mallampati score: III  TM Distance: >3 FB  Neck ROM: full     Dental        Cardiovascular      Pulmonary      Other Findings  post-pubertal.      Anesthesia Plan  ASA Score- 3     Anesthesia Type- IV sedation with anesthesia with ASA Monitors.         Additional Monitors:     Airway Plan:     Comment: I discussed the risks and benefits of IV sedation anesthesia including the possibility of the need to convert to general anesthesia and the potential risk of awareness.       Plan Factors-Exercise tolerance (METS): >4 METS.Exercise comment: Able to climb flight of stairs without cardiopulmonary limitation.    Chart reviewed.   Existing labs reviewed.     Patient is not a current smoker.  Patient did not smoke on day of surgery.            Induction- intravenous.    Postoperative Plan-     Informed Consent- Anesthetic plan and risks discussed with patient.

## 2024-04-29 NOTE — H&P
History and Physical - [facility]  Maia Noyola 63 y.o. female MRN: [id number]                  HPI: Maia Noyola is a 63 y.o. year old female who presents for EGD/colonoscopy      REVIEW OF SYSTEMS: Per the HPI, and otherwise unremarkable.    Historical Information   Past Medical History:   Diagnosis Date    Anemia     Anxiety     Arthritis     Back pain     Lower back    Bipolar 1 disorder (HCC)     Depression     Diabetes (HCC)     Diabetes mellitus (HCC)     Diverticulosis     Hip dysplasia     Bilateral    Hyperlipidemia     Knee pain, right     Nasal septal perforation     following a septoplasty    Obesity     Psoriasis     Along hairline.    Stress incontinence      Past Surgical History:   Procedure Laterality Date    COLONOSCOPY      DILATION AND CURETTAGE OF UTERUS      One D&C and one D&E    ESOPHAGOGASTRODUODENOSCOPY      NASAL SEPTUM SURGERY      IA ARTHRS KNE SURG W/MENISCECTOMY MED/LAT W/SHVG Right 09/27/2018    Procedure: ARTHROSCOPY KNEE; PARTIAL MEDIAL MENISECTOMY; CHONDROPLASTY;  Surgeon: Lien Contreras DO;  Location: [facility];  Service: Orthopedics    TONSILECTOMY AND ADNOIDECTOMY       Social History   Social History     Substance and Sexual Activity   Alcohol Use Yes    Comment: rare     Social History     Substance and Sexual Activity   Drug Use No     Social History     Tobacco Use   Smoking Status Never   Smokeless Tobacco Never     Family History   Problem Relation Age of Onset    Asthma Mother     No Known Problems Father     Rectal cancer Sister 58    No Known Problems Daughter     No Known Problems Maternal Grandmother     No Known Problems Maternal Grandfather     No Known Problems Paternal Grandmother     No Known Problems Paternal Grandfather     No Known Problems Paternal Aunt        Meds/Allergies       Current Outpatient Medications:     Ascorbic Acid (Vitamin C) 100 MG CHEW    buPROPion (WELLBUTRIN SR) 100 mg 12 hr tablet    cyanocobalamin 1,000  "mcg/mL    escitalopram (LEXAPRO) 5 mg tablet    Ferrous Sulfate (Iron) 325 (65 Fe) MG TABS    gabapentin (NEURONTIN) 400 mg capsule    linaCLOtide (Linzess) 72 MCG CAPS    Multiple Vitamin (MULTIVITAMIN) capsule    oxybutynin (DITROPAN-XL) 10 MG 24 hr tablet    rosuvastatin (CRESTOR) 10 MG tablet    traZODone (DESYREL) 100 mg tablet    Trulicity 0.75 MG/0.5ML SOPN    acetaminophen (TYLENOL) 500 mg tablet    Fiber 500 MG CAPS    polyethylene glycol (GOLYTELY) 4000 mL solution    polyethylene glycol (MiraLax) 17 GM/SCOOP powder    Current Facility-Administered Medications:     lactated ringers infusion, 50 mL/hr, Intravenous, Continuous    lidocaine (PF) (XYLOCAINE-MPF) 1 % injection 0.5 mL, 0.5 mL, Infiltration, Once PRN    Allergies   Allergen Reactions    Banana - Food Allergy Hives     Itching and swelling     Crab (Diagnostic) - Food Allergy Hives    Cleocin [Clindamycin]      Causes C Diff    Latex      Pt told not to be in contact with latex because of banana allergy    Penicillins Other (See Comments)     Causes vaginal itching and must take Diflucan with it       Objective     /56   Pulse 63   Temp 98.4 °F (36.9 °C) (Temporal)   Resp 16   Ht 5' 4\" (1.626 m)   Wt 87.5 kg (193 lb)   SpO2 97%   BMI 33.13 kg/m²       PHYSICAL EXAM    Gen: NAD  Head: NCAT  CV: RRR  CHEST: Clear  ABD: soft, NT/ND  EXT: no edema      ASSESSMENT/PLAN:  This is a 63 y.o. year old female here for EGD/colonoscopy, and she is stable and optimized for her procedure.       "

## 2024-05-02 PROCEDURE — 88305 TISSUE EXAM BY PATHOLOGIST: CPT | Performed by: PATHOLOGY

## 2024-05-02 PROCEDURE — 88342 IMHCHEM/IMCYTCHM 1ST ANTB: CPT | Performed by: PATHOLOGY

## 2024-05-16 ENCOUNTER — HOSPITAL ENCOUNTER (OUTPATIENT)
Dept: MAMMOGRAPHY | Facility: HOSPITAL | Age: 64
Discharge: HOME | End: 2024-05-16
Attending: FAMILY MEDICINE
Payer: COMMERCIAL

## 2024-05-16 ENCOUNTER — HOSPITAL ENCOUNTER (OUTPATIENT)
Dept: ULTRASOUND IMAGING | Facility: HOSPITAL | Age: 64
Discharge: HOME | End: 2024-05-16
Attending: FAMILY MEDICINE
Payer: COMMERCIAL

## 2024-05-16 VITALS — BODY MASS INDEX: 32.95 KG/M2 | HEIGHT: 64 IN | WEIGHT: 193 LBS

## 2024-05-16 DIAGNOSIS — R92.8 ABNORMAL MAMMOGRAM: ICD-10-CM

## 2024-05-16 PROCEDURE — 76642 ULTRASOUND BREAST LIMITED: CPT

## 2024-05-16 PROCEDURE — 77065 DX MAMMO INCL CAD UNI: CPT

## 2024-05-16 PROCEDURE — G0279 TOMOSYNTHESIS, MAMMO: HCPCS

## 2024-05-24 DIAGNOSIS — K58.1 IRRITABLE BOWEL SYNDROME WITH CONSTIPATION: ICD-10-CM

## 2024-05-24 RX ORDER — LINACLOTIDE 72 UG/1
72 CAPSULE, GELATIN COATED ORAL DAILY
Qty: 90 CAPSULE | Refills: 1 | Status: SHIPPED | OUTPATIENT
Start: 2024-05-24 | End: 2024-08-22

## 2024-05-24 RX ORDER — LINACLOTIDE 72 UG/1
72 CAPSULE, GELATIN COATED ORAL DAILY
Qty: 14 CAPSULE | Refills: 0 | Status: SHIPPED | OUTPATIENT
Start: 2024-05-24 | End: 2024-08-22

## 2024-05-24 NOTE — TELEPHONE ENCOUNTER
Caller: Evelia    Reason for call: Patient will be out of her prescription.    She needs a small prescription for the Linzess to the [facility].    And then A regular sent to her Mail order.  Her mail order has changed to [facility] I could not find in the system, she she provided two addresses and it was not coming up.  If someone could call and try to see, she no longer used [facility]    Call back#: [phone]

## 2024-06-04 ENCOUNTER — TRANSCRIBE ORDERS (OUTPATIENT)
Dept: GASTROENTEROLOGY | Facility: CLINIC | Age: 64
End: 2024-06-04

## 2024-07-11 ENCOUNTER — OFFICE VISIT (OUTPATIENT)
Dept: PSYCHIATRY | Facility: CLINIC | Age: 64
End: 2024-07-11
Payer: COMMERCIAL

## 2024-07-11 DIAGNOSIS — F31.81 BIPOLAR II DISORDER (HCC): Primary | ICD-10-CM

## 2024-07-11 PROCEDURE — 99214 OFFICE O/P EST MOD 30 MIN: CPT | Performed by: NURSE PRACTITIONER

## 2024-07-11 NOTE — PSYCH
PROGRESS NOTE        [facility]      Name and Date of Birth:  Maia Noyola 64 y.o. 1960    Date of Visit: 07/11/24    SUBJECTIVE: Patient is a 64-year-old female has a history of bipolar 2 disorder.  Doing very well on her current medication regimen.  She is happy and content.  [place] [facility] [place] really [place].  Spending the summer enjoying being outside and being with friends and family.  Offering no complaints.  Sleep is good.  Appetite is good.  Follow-up with me in 3 months or sooner if necessary.        She denies suicidal ideation, intent or plan at present, has no suicidal ideation, intent or plan at present.    She denies any auditory hallucinations and visual hallucinations, denies any other delusional thinking, denies any delusional thinking.    She denies any side effects from medications  .  HPI ROS Appetite Changes and Sleep: normal appetite, normal sleep    Review Of Systems:      Constitutional Negative   ENT Negative   Cardiovascular Negative   Respiratory As Noted in HPI   Gastrointestinal Negative   Genitourinary Negative   Musculoskeletal Negative   Integumentary Negative   Neurological Negative   Endocrine Negative   Other Symptoms Negative and None       Laboratory Results: No results found for this or any previous visit.    Substance Abuse History:    Social History     Substance and Sexual Activity   Drug Use No       Family Psychiatric History:     Family History   Problem Relation Age of Onset    Asthma Mother     No Known Problems Father     Rectal cancer Sister 58    No Known Problems Daughter     No Known Problems Maternal Grandmother     No Known Problems Maternal Grandfather     No Known Problems Paternal Grandmother     No Known Problems Paternal Grandfather     No Known Problems Paternal Aunt        The following portions of the patient's history were reviewed and updated as appropriate:  past family history, past medical history, past social history, past surgical history and problem list.    Social History     Socioeconomic History    Marital status: /Civil Union     Spouse name: Not on file    Number of children: Not on file    Years of education: Not on file    Highest education level: Not on file   Occupational History    Not on file   Tobacco Use    Smoking status: Never    Smokeless tobacco: Never   Vaping Use    Vaping status: Never Used   Substance and Sexual Activity    Alcohol use: Yes     Comment: rare    Drug use: No    Sexual activity: Not on file   Other Topics Concern    Not on file   Social History Narrative    Not on file     Social Determinants of Health     Financial Resource Strain: Not on file   Food Insecurity: Not on file   Transportation Needs: Not on file   Physical Activity: Not on file   Stress: Not on file   Social Connections: Not on file   Intimate Partner Violence: Not on file   Housing Stability: Not on file     Social History     Social History Narrative    Not on file        Social History       Tobacco History       Smoking Status  Never      Smokeless Tobacco Use  Never              Alcohol History       Alcohol Use Status  Yes Comment  rare              Drug Use       Drug Use Status  No              Sexual Activity       Sexually Active  Not Asked              Activities of Daily Living    Not Asked                       OBJECTIVE:     Mental Status Evaluation:    Appearance age appropriate, casually dressed   Behavior pleasant, cooperative   Speech normal volume, normal pitch   Mood Stable mood   Affect Bright affect   Thought Processes logical   Associations intact associations   Thought Content No overt delusions   Perceptual Disturbances: none   Abnormal Thoughts  Risk Potential Suicidal ideation - None  Homicidal ideation - None  Potential for aggression - No   Orientation oriented to person, place, time/date and situation   Memory recent and remote  memory grossly intact   Cosciousness alert and awake   Attention Span attention span and concentration are age appropriate   Intellect Appears to be of Average Intelligence   Insight Good   Judgement Good   Muscle Strength and  Gait muscle strength and tone were normal   Language no difficulty naming common objects   Fund of Knowledge displays adequate knowledge of current events   Pain none   Pain Scale 0       Assessment/Plan:       Diagnoses and all orders for this visit:    Bipolar II disorder (HCC)          Treatment Recommendations/Precautions:    Continue current medications:  Wellbutrin  mg daily  Gabapentin 400 mg twice daily  Trazodone 100 mg, 1/2-2 tabs at bedtime as needed  Lexapro 5 mg daily  Follow-up with me in 3 months or sooner if necessary.      Safety plan: Patient is aware of the 24-hour on-call service offered by [facility].  Patient is also aware of the 24-hour crisis call service offered by [facility].  Patient has a very good support system with her  and family..  The patient agrees to call 911 or go directly to the emergency room if she begins to experience any suicidal ideation.       Risks/Benefits      Risks, Benefits And Possible Side Effects Of Medications:    Risks, benefits, and possible side effects of medications explained to patient and patient verbalizes understanding and agreement for treatment.    Controlled Medication Discussion:     Not applicable    Psychotherapy Provided:     Individual psychotherapy provided: No.  The visit started at noon and ended at 12:25 PM.  Time was spent reviewing the treatment plan, reviewing medications, ordering medications and completing the progress note.

## 2024-07-11 NOTE — PSYCH
"TREATMENT PLAN (Medication Management Only)        [facility]    Name and Date of Birth:  Maia Noyola 64 y.o. 1960  Date of Treatment Plan: July 11, 2024  Diagnosis/Diagnoses:    1. Bipolar II disorder (HCC)      Strengths/Personal Resources for Self-Care: supportive family, supportive friends.  Area/Areas of need (in own words): \" I am doing okay.  I feel good.\".  1. Long Term Goal: \" To continue to feel well and function at my best.\".   Target Date: 6 months - 1/11/2025  Person/Persons responsible for completion of goal: Maia MCKENZIE  2.  Short Term Objective (s) - How will we reach this goal?:   A.  Provider new recommended medication/dosage changes and/or continue medication(s): continue current medications as prescribed.  B.  N/A.    Target Date: 6 months - 1/11/2025  Person/Persons Responsible for Completion of Goal: Maia JONAH  Progress Towards Goals: stable, continuing treatment  Treatment Modality: medication education at every visit  Review due 6 months from date of this plan: 6 months - 1/11/2025  Expected length of service: ongoing treatment unless revised  My Physician/PA/NP and I have developed this plan together and I agree to work on the goals and objectives. I understand the treatment goals that were developed for my treatment.  "

## 2024-07-30 ENCOUNTER — TELEPHONE (OUTPATIENT)
Age: 64
End: 2024-07-30

## 2024-07-30 ENCOUNTER — OFFICE VISIT (OUTPATIENT)
Age: 64
End: 2024-07-30
Payer: COMMERCIAL

## 2024-07-30 VITALS
SYSTOLIC BLOOD PRESSURE: 102 MMHG | RESPIRATION RATE: 20 BRPM | TEMPERATURE: 97.2 F | WEIGHT: 196.6 LBS | BODY MASS INDEX: 33.57 KG/M2 | OXYGEN SATURATION: 97 % | HEART RATE: 61 BPM | HEIGHT: 64 IN | DIASTOLIC BLOOD PRESSURE: 62 MMHG

## 2024-07-30 DIAGNOSIS — K57.90 DIVERTICULOSIS: ICD-10-CM

## 2024-07-30 DIAGNOSIS — K29.70 GASTRITIS WITHOUT BLEEDING, UNSPECIFIED CHRONICITY, UNSPECIFIED GASTRITIS TYPE: ICD-10-CM

## 2024-07-30 DIAGNOSIS — K58.1 IRRITABLE BOWEL SYNDROME WITH CONSTIPATION: Primary | ICD-10-CM

## 2024-07-30 PROCEDURE — 99214 OFFICE O/P EST MOD 30 MIN: CPT | Performed by: INTERNAL MEDICINE

## 2024-07-30 NOTE — PROGRESS NOTES
[facility] - Outpatient Note  Maia Noyola 64 y.o. female MRN: [id number]  Unit/Bed#:  Encounter: [id number]          ASSESSMENT AND PLAN:    Patient is a 64-year-old female with a past medical history of irritable bowel syndrome with constipation, history of a colon polyp, diverticulosis, and mild GERD who presents for follow-up visit.      Irritable bowel syndrome with constipation  Mild gastritis  Diverticulosis  Patient states she is feeling better compared to last visit and takes Linzess daily along with MiraLAX 3 times a week which has helped her constipation.  Denies any epigastric pain or reflux symptoms.  Denies any blood in stool.    -Continue using Linzess and MiraLAX to help with constipation and avoid straining  -Advised to increase fiber intake via food or Metamucil  -Follow-up in 1 year    FOLLOW-UP:  Return in about 1 year (around 7/30/2025).    VISIT DIAGNOSES AND ORDERS:      1. Irritable bowel syndrome with constipation    2. Gastritis without bleeding, unspecified chronicity, unspecified gastritis type    3. Diverticulosis      No orders of the defined types were placed in this encounter.        ______________________________________________________________________    HPI:  Ms. Maia Noyola is a 64 y.o. female with medical history significant for irritable bowel syndrome with constipation, history of colon polyp, and mild GERD who presents for follow up visit.     Patient states that her constipation has improved compared to last visit.  She has been taking Linzess 72 mg every day and MiraLAX around 3 times a week.  Patient has bowel movements usually every day or every other day with mild straining.  She has also made some dietary changes which has helped her symptoms.  She denies any pain or bleeding after bowel movement.  Denies any weight loss, change in stool caliber, vomiting, or abdominal pain.      REVIEW OF SYSTEMS:    10 point ROS reviewed and negative except  otherwise noted in the HPI above.     Historical Information   Past Medical History:   Diagnosis Date    Anemia     Anxiety     Arthritis     Back pain     Lower back    Bipolar 1 disorder (HCC) 4/29/2024    Depression     Diabetes (HCC)     Diabetes mellitus (HCC) 4/29/2024    Diverticulosis     Hip dysplasia     Bilateral    Hyperlipidemia     Knee pain, right     Nasal septal perforation     following a septoplasty    Obesity 4/29/2024    Psoriasis     Along hairline.    Stress incontinence      Past Surgical History:   Procedure Laterality Date    COLONOSCOPY      DILATION AND CURETTAGE OF UTERUS      One D&C and one D&E    ESOPHAGOGASTRODUODENOSCOPY      NASAL SEPTUM SURGERY      MD ARTHRS KNE SURG W/MENISCECTOMY MED/LAT W/SHVG Right 09/27/2018    Procedure: ARTHROSCOPY KNEE; PARTIAL MEDIAL MENISECTOMY; CHONDROPLASTY;  Surgeon: Lien Contreras DO;  Location: [facility];  Service: Orthopedics    TONSILECTOMY AND ADNOIDECTOMY       Social History   Social History     Substance and Sexual Activity   Alcohol Use Yes    Comment: rare     Social History     Substance and Sexual Activity   Drug Use No     Social History     Tobacco Use   Smoking Status Never   Smokeless Tobacco Never     Family History   Problem Relation Age of Onset    Asthma Mother     No Known Problems Father     Rectal cancer Sister 58    No Known Problems Daughter     No Known Problems Maternal Grandmother     No Known Problems Maternal Grandfather     No Known Problems Paternal Grandmother     No Known Problems Paternal Grandfather     No Known Problems Paternal Aunt        Meds/Allergies       Current Outpatient Medications:     acetaminophen (TYLENOL) 500 mg tablet    Ascorbic Acid (Vitamin C) 100 MG CHEW    buPROPion (WELLBUTRIN SR) 100 mg 12 hr tablet    cyanocobalamin 1,000 mcg/mL    escitalopram (LEXAPRO) 5 mg tablet    Ferrous Sulfate (Iron) 325 (65 Fe) MG TABS    gabapentin (NEURONTIN) 400 mg capsule    linaCLOtide (Linzess) 72 MCG  "CAPS    Multiple Vitamin (MULTIVITAMIN) capsule    oxybutynin (DITROPAN-XL) 10 MG 24 hr tablet    polyethylene glycol (MiraLax) 17 GM/SCOOP powder    rosuvastatin (CRESTOR) 10 MG tablet    traZODone (DESYREL) 100 mg tablet    Trulicity 0.75 MG/0.5ML SOPN    Allergies   Allergen Reactions    Banana - Food Allergy Hives     Itching and swelling     Crab (Diagnostic) - Food Allergy Hives    Cleocin [Clindamycin]      Causes C Diff    Latex      Pt told not to be in contact with latex because of banana allergy    Penicillins Other (See Comments)     Causes vaginal itching and must take Diflucan with it       Objective     Blood pressure 102/62, pulse 61, temperature (!) 97.2 °F (36.2 °C), temperature source Temporal, resp. rate 20, height 5' 4\" (1.626 m), weight 89.2 kg (196 lb 9.6 oz), SpO2 97%.    PHYSICAL EXAM:    General: Well-appearing, NAD  Eyes:  no conjunctival icterus or pallor  Abdominal: Soft, non-tender, non-distended  Neuro: alert and oriented  Psych: Normal affect    Lab Results:   Lab Results   Component Value Date    K 4.1 04/18/2024    CO2 27 04/18/2024     04/18/2024    BUN 14 04/18/2024    CREATININE 0.80 04/18/2024     Lab Results   Component Value Date    WBC 4.70 04/18/2024    HGB 14.5 04/18/2024    HCT 44.1 04/18/2024    MCV 87 04/18/2024     04/18/2024     Lab Results   Component Value Date    TP 6.9 04/18/2024    AST 19 04/18/2024    ALT 16 04/18/2024    INR 1.02 11/29/2022      Lab Results   Component Value Date    IRON 68 04/18/2024    FERRITIN 31 04/18/2024     Lab Results   Component Value Date    HDL 40 (L) 04/18/2024    TRIG 123 04/18/2024       Radiology Results:   I have reviewed the results of any radiology studies performed within the last 90 days.     Hilda Ramey MD  Gastroenterology Fellow  [facility]  Division of Gastroenterology and Hepatology      Answers submitted by the patient for this visit:  Abdominal Pain Questionnaire (Submitted " on 7/23/2024)  Chief Complaint: Abdominal pain  Chronicity: recurrent  Onset: more than 1 year ago  Onset quality: undetermined  Frequency: rarely  Episode duration: 0.25 Hours  Progression since onset: resolved  Pain location: periumbilical region  Pain - numeric: 0/10  Pain quality: dull  Radiates to: does not radiate  anorexia: Yes  arthralgias: No  belching: No  constipation: Yes  diarrhea: No  dysuria: No  fever: No  flatus: No  frequency: No  headaches: No  hematochezia: No  hematuria: No  melena: No  myalgias: No  nausea: No  weight loss: No  vomiting: No  Aggravated by: nothing  Relieved by: nothing  Diagnostic workup: lower endoscopy

## 2024-07-30 NOTE — TELEPHONE ENCOUNTER
Pt called in and was summoned for Jury Duty. She has 8 days because it came late in the mail.  Pt states Kacey Treviño stated that Pt should not do Jury Duty and have a letter to decline.  Pt requesting a letter to be released from Jury Duty.  Writer informed pt it could be sent through [employer].  Please return call to pt when letter is finished.

## 2024-10-17 ENCOUNTER — TELEMEDICINE (OUTPATIENT)
Dept: PSYCHIATRY | Facility: CLINIC | Age: 64
End: 2024-10-17
Payer: COMMERCIAL

## 2024-10-17 DIAGNOSIS — F31.81 BIPOLAR II DISORDER (HCC): Primary | ICD-10-CM

## 2024-10-17 PROCEDURE — 99214 OFFICE O/P EST MOD 30 MIN: CPT | Performed by: NURSE PRACTITIONER

## 2024-10-18 DIAGNOSIS — F31.9 BIPOLAR I DISORDER WITH DEPRESSION (HCC): ICD-10-CM

## 2024-10-18 DIAGNOSIS — F31.9 BIPOLAR DISORDER WITH DEPRESSION (HCC): ICD-10-CM

## 2024-10-18 DIAGNOSIS — F33.1 MODERATE EPISODE OF RECURRENT MAJOR DEPRESSIVE DISORDER (HCC): ICD-10-CM

## 2024-10-18 PROBLEM — F31.81 BIPOLAR II DISORDER (HCC): Status: ACTIVE | Noted: 2024-04-29

## 2024-10-18 RX ORDER — GABAPENTIN 400 MG/1
400 CAPSULE ORAL 2 TIMES DAILY
Qty: 270 CAPSULE | Refills: 2 | Status: SHIPPED | OUTPATIENT
Start: 2024-10-18

## 2024-10-18 RX ORDER — BUPROPION HYDROCHLORIDE 100 MG/1
TABLET, EXTENDED RELEASE ORAL
Qty: 270 TABLET | Refills: 3 | Status: SHIPPED | OUTPATIENT
Start: 2024-10-18 | End: 2024-10-23 | Stop reason: SDUPTHER

## 2024-10-18 RX ORDER — ESCITALOPRAM OXALATE 5 MG/1
5 TABLET ORAL DAILY
Qty: 90 TABLET | Refills: 3 | Status: SHIPPED | OUTPATIENT
Start: 2024-10-18

## 2024-10-18 NOTE — PSYCH
Virtual Regular Visit    Verification of patient location:    Patient is located at Home in the following state in which I hold an active license [place]      Assessment/Plan:    Problem List Items Addressed This Visit       Bipolar II disorder (HCC) - Primary       Goals addressed in session: Maintain current level of stability         Reason for visit is   Chief Complaint   Patient presents with    Virtual Regular Visit          Encounter provider RUSSELL Ritter      Recent Visits  Date Type Provider Dept   10/17/24 Telemedicine RUSSELL Ritter  [facility]   Showing recent visits within past 7 days and meeting all other requirements  Future Appointments  No visits were found meeting these conditions.  Showing future appointments within next 150 days and meeting all other requirements       The patient was identified by name and date of birth. Maia Noyola was informed that this is a telemedicine visit and that the visit is being conducted throughthe [employer] platform. She agrees to proceed..  My office door was closed. No one else was in the room.  She acknowledged consent and understanding of privacy and security of the video platform. The patient has agreed to participate and understands they can discontinue the visit at any time.    Patient is aware this is a billable service.     Subjective  Maia Noyola is a 64 y.o. female with a history of bipolar 2 disorder.  Doing very well on her current medication regimen.  No breakthrough symptoms of anxiety or depression.  Truly, there are some unfortunate family issues that are occurring that are causing her some pause.  Her granddaughter is suffering from migraines which have not been well-controlled so she worries about her and her great nephew, age 3 is suffering from cancer.  Understandably, she is feeling some sadness and gets emotional at times from these events.  However, she is able to recover nicely and she is sleeping  well, eating well and managing her life and functioning at her best.  She and her  remain active and social.  Follow-up with me in the 2 months or sooner if necessary.  Mental status exam: Patient is awake and alert and oriented x 3.  Mood is stable.  Patient is working through some outside family issues appropriately.  Patient is not suicidal, not homicidal and not psychotic.  Associations are intact.  No overt delusions.  Speech is clear and thoughts are well-organized, coherent and goal-directed.  Attention span is good.  Impulse control is good.  Judgment and insight are good.  Memory is good.  The patient will continue on:  Wellbutrin  mg daily  Lexapro 5 mg daily  Gabapentin 400 mg twice daily  Trazodone 100 mg at bedtime  Follow-up with me in 2 months or sooner if necessary.    We have discussed their safety plan and pt agrees that if they experience unsafe thoughts that they will reach out to their supports including this office, the suicide hotline, and emergency services if necessary.     Assessment & Plan  Bipolar II disorder (HCC)  Gabapentin 400 mg twice daily  Wellbutrin  mg daily  Lexapro 5 mg daily  Trazodone 100 mg at bedtime  Follow-up in 2 months or sooner if necessary               Past Medical History:   Diagnosis Date    Anemia     Anxiety     Arthritis     Back pain     Lower back    Bipolar 1 disorder (HCC) 4/29/2024    Depression     Diabetes (HCC)     Diabetes mellitus (HCC) 4/29/2024    Diverticulosis     Hip dysplasia     Bilateral    Hyperlipidemia     Knee pain, right     Nasal septal perforation     following a septoplasty    Obesity 4/29/2024    Psoriasis     Along hairline.    Stress incontinence        Past Surgical History:   Procedure Laterality Date    COLONOSCOPY      DILATION AND CURETTAGE OF UTERUS      One D&C and one D&E    ESOPHAGOGASTRODUODENOSCOPY      NASAL SEPTUM SURGERY      MA ARTHRS KNE SURG W/MENISCECTOMY MED/LAT W/SHVG Right 09/27/2018     Procedure: ARTHROSCOPY KNEE; PARTIAL MEDIAL MENISECTOMY; CHONDROPLASTY;  Surgeon: Lien Contreras DO;  Location: [facility];  Service: Orthopedics    TONSILECTOMY AND ADNOIDECTOMY         Current Outpatient Medications   Medication Sig Dispense Refill    acetaminophen (TYLENOL) 500 mg tablet Take 500 mg by mouth every 6 (six) hours as needed for mild pain      Ascorbic Acid (Vitamin C) 100 MG CHEW       buPROPion (WELLBUTRIN SR) 100 mg 12 hr tablet TAKE 3 TABLETS BY MOUTH DAILY 270 tablet 3    cyanocobalamin 1,000 mcg/mL inject 1 milliliter ( 1000 MCG ) subcutaneously Every Month      escitalopram (LEXAPRO) 5 mg tablet TAKE 1 TABLET BY MOUTH DAILY 90 tablet 3    Ferrous Sulfate (Iron) 325 (65 Fe) MG TABS       gabapentin (NEURONTIN) 400 mg capsule Take 1 capsule (400 mg total) by mouth 2 (two) times a day 270 capsule 2    Multiple Vitamin (MULTIVITAMIN) capsule Take 1 capsule by mouth daily      oxybutynin (DITROPAN-XL) 10 MG 24 hr tablet Take 10 mg by mouth daily at bedtime      polyethylene glycol (MiraLax) 17 GM/SCOOP powder       rosuvastatin (CRESTOR) 10 MG tablet       traZODone (DESYREL) 100 mg tablet 1/2-2  tablet 3    Trulicity 0.75 MG/0.5ML SOPN inject 0.5 milliliters ( 0.75 milligrams ) subcutaneously every week       No current facility-administered medications for this visit.        Allergies   Allergen Reactions    Banana - Food Allergy Hives     Itching and swelling     Crab (Diagnostic) - Food Allergy Hives    Cleocin [Clindamycin]      Causes C Diff    Latex      Pt told not to be in contact with latex because of banana allergy    Penicillins Other (See Comments)     Causes vaginal itching and must take Diflucan with it       Review of Systems    Video Exam    There were no vitals filed for this visit.    Physical Exam     Visit Time    Visit Start Time:2:30p  Visit Stop Time: 2:55p  Total Visit Duration: 25 minutes were spent in the visit.  Time spent reviewing the treatment plan, reviewing  medications, ordering medications and completing the progress note.

## 2024-10-18 NOTE — ASSESSMENT & PLAN NOTE
Gabapentin 400 mg twice daily  Wellbutrin  mg daily  Lexapro 5 mg daily  Trazodone 100 mg at bedtime  Follow-up in 2 months or sooner if necessary

## 2024-10-22 ENCOUNTER — OFFICE VISIT (OUTPATIENT)
Dept: PODIATRY | Facility: CLINIC | Age: 64
End: 2024-10-22
Payer: COMMERCIAL

## 2024-10-22 DIAGNOSIS — G89.29 CHRONIC PAIN OF LEFT ANKLE: Primary | ICD-10-CM

## 2024-10-22 DIAGNOSIS — M25.572 CHRONIC PAIN OF LEFT ANKLE: Primary | ICD-10-CM

## 2024-10-22 DIAGNOSIS — M77.32 BONE SPUR OF POSTERIOR PORTION OF LEFT CALCANEUS: ICD-10-CM

## 2024-10-22 DIAGNOSIS — M77.52 ENTHESOPATHY OF LEFT ANKLE: ICD-10-CM

## 2024-10-22 PROCEDURE — 99203 OFFICE O/P NEW LOW 30 MIN: CPT | Performed by: PODIATRIST

## 2024-10-22 NOTE — PROGRESS NOTES
Ambulatory Visit  Name: Maia Noyola      : 1960      MRN: [id number]  Encounter Provider: Khanh Quinn DPM  Encounter Date: 10/22/2024   Encounter department: [facility] [place]    Assessment & Plan  Chronic pain of left ankle    Orders:    XR ankle 3+ vw left; Future    Ambulatory Referral to Physical Therapy; Future    Enthesopathy of left ankle    Orders:    Ambulatory Referral to Physical Therapy; Future    Bone spur of posterior portion of left calcaneus    Orders:    Ambulatory Referral to Physical Therapy; Future    Diagnosis and options discussed with patient  Patient agreeable to the plan as stated below  Xr reviewed    Diagnosis and options discussed with patient  Patient agreeable to the plan as stated below    1. Discussed diagnosis and treatment options  2. Provided home therapy and stretching exercises  3. Educated eccentric exercises for the achilles enthesopathy.   4. Stressed compliance with home AND formal Physical Therapy  5. Purchase Tuli heel cups off [employer]. They will add a soft protection over the back of the heel  6. Voltaren gel is an over the counter anti-inflammatory. Safe to apply to the back of the heel 2-3 times per day to reduce pain/inflammation  7. RTC 6 weeks    History of Present Illness     Maia Noyola is a 64 y.o. female who presents with chronic left ankle pain. She has had pain on the back of the heel for many months, no initial trauma. She tries stretching it but it just isn't really going away. Sandals feel better, a closed shoe makes it hurt more.       Review of Systems  As stated in HPI, otherwise normal    Medical History Reviewed by provider this encounter:  Tobacco  Allergies  Meds  Problems  Med Hx  Surg Hx  Fam Hx            Objective     There were no vitals taken for this visit.    Physical Exam  Vitals reviewed.   Constitutional:       General: She is not in acute distress.  Cardiovascular:      Rate and Rhythm: Normal  rate.      Pulses: Normal pulses.   Pulmonary:      Effort: Pulmonary effort is normal. No respiratory distress.   Musculoskeletal:         General: Deformity (palpable spurring posterior left calcaneus at achilles insertion which is TTP. Normal STJ and ankle ROM) present.      Right ankle: No deformity. Anterior drawer test negative.      Right Achilles Tendon: Tenderness (insertional pain and spurring) present. No defects. Urban's test negative.   Skin:     Findings: No bruising.   Neurological:      Mental Status: She is alert and oriented to person, place, and time.      Sensory: No sensory deficit.         XRay 3 views of the left ankle personally read by Dr. Quinn in office today and discussed with patient:    There is no acute fracture or dislocation.  No significant degenerative changes. Posterior and plantar calcaneal spurring noted  No lytic or blastic osseous lesion.  Soft tissues are unremarkable.

## 2024-10-22 NOTE — PATIENT INSTRUCTIONS
"5. Purchase \"Tuli heel cups\" off [employer]. They will add a soft protection over the back of the heel  6. Voltaren gel is an over the counter anti-inflammatory. Safe to apply to the back of the heel 2-3 times per day to reduce pain/inflammation  "

## 2024-10-23 ENCOUNTER — TELEPHONE (OUTPATIENT)
Age: 64
End: 2024-10-23

## 2024-10-23 DIAGNOSIS — F33.1 MODERATE EPISODE OF RECURRENT MAJOR DEPRESSIVE DISORDER (HCC): ICD-10-CM

## 2024-10-23 RX ORDER — BUPROPION HYDROCHLORIDE 300 MG/1
300 TABLET ORAL DAILY
Qty: 90 TABLET | Refills: 1 | Status: SHIPPED | OUTPATIENT
Start: 2024-10-23

## 2024-10-23 NOTE — TELEPHONE ENCOUNTER
Evelia was transferred to this writer from the call center. She is requesting a script be sent for wellbutrin  mg daily since pharmacy will cover that, and not the script she has ordered now.     [facility] - [address] [place] [address]

## 2024-10-23 NOTE — TELEPHONE ENCOUNTER
Pharmacy called in to inform provider patients insurance does not want to pay for WELLBUTRIN SR 100mg.    Pharmacy informed writer insurance want to pay for WELLBUTRIN SR 300mg, 1 tablet daily.     Writer informed pharmacy msg will be relay to provider.

## 2024-10-25 ENCOUNTER — APPOINTMENT (OUTPATIENT)
Dept: LAB | Facility: CLINIC | Age: 64
End: 2024-10-25
Payer: COMMERCIAL

## 2024-10-25 DIAGNOSIS — D64.9 ANEMIA, UNSPECIFIED TYPE: ICD-10-CM

## 2024-10-25 DIAGNOSIS — E11.9 TYPE 2 DIABETES MELLITUS WITHOUT COMPLICATION, UNSPECIFIED WHETHER LONG TERM INSULIN USE (HCC): ICD-10-CM

## 2024-10-25 LAB
ALBUMIN SERPL BCG-MCNC: 4.3 G/DL (ref 3.5–5)
ALP SERPL-CCNC: 75 U/L (ref 34–104)
ALT SERPL W P-5'-P-CCNC: 14 U/L (ref 7–52)
ANION GAP SERPL CALCULATED.3IONS-SCNC: 6 MMOL/L (ref 4–13)
AST SERPL W P-5'-P-CCNC: 17 U/L (ref 13–39)
BILIRUB SERPL-MCNC: 0.74 MG/DL (ref 0.2–1)
BUN SERPL-MCNC: 12 MG/DL (ref 5–25)
CALCIUM SERPL-MCNC: 9.8 MG/DL (ref 8.4–10.2)
CHLORIDE SERPL-SCNC: 108 MMOL/L (ref 96–108)
CHOLEST SERPL-MCNC: 116 MG/DL
CO2 SERPL-SCNC: 28 MMOL/L (ref 21–32)
CREAT SERPL-MCNC: 0.86 MG/DL (ref 0.6–1.3)
ERYTHROCYTE [DISTWIDTH] IN BLOOD BY AUTOMATED COUNT: 13.1 % (ref 11.6–15.1)
EST. AVERAGE GLUCOSE BLD GHB EST-MCNC: 105 MG/DL
FERRITIN SERPL-MCNC: 53 NG/ML (ref 11–307)
GFR SERPL CREATININE-BSD FRML MDRD: 71 ML/MIN/1.73SQ M
GLUCOSE P FAST SERPL-MCNC: 96 MG/DL (ref 65–99)
HBA1C MFR BLD: 5.3 %
HCT VFR BLD AUTO: 45.4 % (ref 34.8–46.1)
HDLC SERPL-MCNC: 41 MG/DL
HGB BLD-MCNC: 15.1 G/DL (ref 11.5–15.4)
IRON SATN MFR SERPL: 22 % (ref 15–50)
IRON SERPL-MCNC: 67 UG/DL (ref 50–212)
LDLC SERPL CALC-MCNC: 54 MG/DL (ref 0–100)
MCH RBC QN AUTO: 28.6 PG (ref 26.8–34.3)
MCHC RBC AUTO-ENTMCNC: 33.3 G/DL (ref 31.4–37.4)
MCV RBC AUTO: 86 FL (ref 82–98)
NONHDLC SERPL-MCNC: 75 MG/DL
PLATELET # BLD AUTO: 159 THOUSANDS/UL (ref 149–390)
PMV BLD AUTO: 13 FL (ref 8.9–12.7)
POTASSIUM SERPL-SCNC: 4 MMOL/L (ref 3.5–5.3)
PROT SERPL-MCNC: 6.9 G/DL (ref 6.4–8.4)
RBC # BLD AUTO: 5.28 MILLION/UL (ref 3.81–5.12)
SODIUM SERPL-SCNC: 142 MMOL/L (ref 135–147)
TIBC SERPL-MCNC: 301 UG/DL (ref 250–450)
TRIGL SERPL-MCNC: 105 MG/DL
UIBC SERPL-MCNC: 234 UG/DL (ref 155–355)
WBC # BLD AUTO: 4.9 THOUSAND/UL (ref 4.31–10.16)

## 2024-10-25 PROCEDURE — 82728 ASSAY OF FERRITIN: CPT

## 2024-10-25 PROCEDURE — 83540 ASSAY OF IRON: CPT

## 2024-10-25 PROCEDURE — 85027 COMPLETE CBC AUTOMATED: CPT

## 2024-10-25 PROCEDURE — 80053 COMPREHEN METABOLIC PANEL: CPT

## 2024-10-25 PROCEDURE — 80061 LIPID PANEL: CPT

## 2024-10-25 PROCEDURE — 83550 IRON BINDING TEST: CPT

## 2024-10-25 PROCEDURE — 36415 COLL VENOUS BLD VENIPUNCTURE: CPT

## 2024-10-25 PROCEDURE — 83036 HEMOGLOBIN GLYCOSYLATED A1C: CPT

## 2024-10-25 NOTE — TELEPHONE ENCOUNTER
Evelia called the office asking about script for Wellbutrin.  States that she has not heard anything.  Informed her that provider sent in a script for the 300 MG XL to [facility].  Evelia states that her insurance will cover the 150 MG BID.  She is asking to speak to Kacey before picking the script up.      Will refer to Kacey Treviño for review.

## 2024-11-06 ENCOUNTER — APPOINTMENT (OUTPATIENT)
Dept: RADIOLOGY | Facility: CLINIC | Age: 64
End: 2024-11-06
Payer: COMMERCIAL

## 2024-11-06 DIAGNOSIS — M25.561 RIGHT KNEE PAIN, UNSPECIFIED CHRONICITY: ICD-10-CM

## 2024-11-06 PROCEDURE — 73562 X-RAY EXAM OF KNEE 3: CPT

## 2024-11-20 ENCOUNTER — EVALUATION (OUTPATIENT)
Dept: PHYSICAL THERAPY | Facility: CLINIC | Age: 64
End: 2024-11-20
Payer: COMMERCIAL

## 2024-11-20 DIAGNOSIS — M77.52 ENTHESOPATHY OF LEFT ANKLE: ICD-10-CM

## 2024-11-20 DIAGNOSIS — M77.32 BONE SPUR OF POSTERIOR PORTION OF LEFT CALCANEUS: ICD-10-CM

## 2024-11-20 DIAGNOSIS — M25.572 CHRONIC PAIN OF LEFT ANKLE: ICD-10-CM

## 2024-11-20 DIAGNOSIS — G89.29 CHRONIC PAIN OF LEFT ANKLE: ICD-10-CM

## 2024-11-20 PROCEDURE — 97530 THERAPEUTIC ACTIVITIES: CPT | Performed by: PHYSICAL THERAPIST

## 2024-11-20 PROCEDURE — 97110 THERAPEUTIC EXERCISES: CPT | Performed by: PHYSICAL THERAPIST

## 2024-11-20 PROCEDURE — 97161 PT EVAL LOW COMPLEX 20 MIN: CPT | Performed by: PHYSICAL THERAPIST

## 2024-11-20 NOTE — PROGRESS NOTES
PT Evaluation     Today's date: 2024  Patient name: Maia Noyola  : 1960  MRN: [id number]  Referring provider: Khanh Quinn D*  Dx:   Encounter Diagnosis     ICD-10-CM    1. Chronic pain of left ankle  M25.572 Ambulatory Referral to Physical Therapy    G89.29       2. Enthesopathy of left ankle  M77.52 Ambulatory Referral to Physical Therapy      3. Bone spur of posterior portion of left calcaneus  M77.32 Ambulatory Referral to Physical Therapy                     Assessment  Impairments: abnormal muscle firing, abnormal or restricted ROM, activity intolerance, impaired physical strength, lacks appropriate home exercise program and pain with function    Assessment details: Maia Noyola is a 64 y.o. female who presents with pain, decreased strength, and decreased ROM. Due to these impairments, patient has difficulty performing ADL's, recreational activities, ambulation. Patient's clinical presentation is consistent with their referring diagnosis of Chronic pain of left ankle  Plan: Ambulatory Referral to Physical Therapy    Enthesopathy of left ankle  Plan: Ambulatory Referral to Physical Therapy    Bone spur of posterior portion of left calcaneus  Plan: Ambulatory Referral to Physical Therapy  . Patient has been educated in home exercise program and plan of care. Patient would benefit from skilled physical therapy services to address their aforementioned functional limitations and progress towards prior level of function and independence with home exercise program.     Barriers to therapy: Time since onset  Understanding of Dx/Px/POC: good     Prognosis: good    Goals  Short Term Goals:    1. Initiate and advance HEP  2. AROM Left Ankle DF 10 degrees  3. SLS time 30 seconds    Long Term Goals:    1. Indep with HEP  2. Wear Shoes 8 hours  3. Walking    Plan  Patient would benefit from: skilled PT  Planned modality interventions: cryotherapy, electrical stimulation/Egyptian stimulation and  thermotherapy: hydrocollator packs    Planned therapy interventions: joint mobilization, manual therapy, patient education, postural training, activity modification, abdominal trunk stabilization, body mechanics training, flexibility, functional ROM exercises, graded exercise, home exercise program, neuromuscular re-education, strengthening, stretching, therapeutic activities, therapeutic exercise, motor coordination training, muscle pump exercises, gait training, balance/weight bearing training and ADL training    Frequency: 2x week  Duration in weeks: 8  Treatment plan discussed with: patient  Plan details: Pt reviewed and agreed with POC      Subjective Evaluation    History of Present Illness  Mechanism of injury: Pt reports insidious onset of Left ankle pain in late . Pt Dx with heel spur and sent to PT.  Patient Goals  Patient goals for therapy: decreased pain    Pain  Current pain ratin  At best pain ratin  At worst pain ratin  Quality: burning (annoying)  Relieving factors: medications (take pressure off of it)  Aggravating factors: walking (wearing shoes)      Diagnostic Tests  X-ray: abnormal  Treatments  Previous treatment: medication  Current treatment: medication and physical therapy    Objective     Active Range of Motion   Left Knee   Flexion: WFL  Extensor lag: 10 degrees     Right Knee   Flexion: WFL  Extensor lag: 15 degrees   Left Ankle/Foot   Dorsiflexion (ke): 7 degrees   Dorsiflexion (kf): 6 degrees   Plantar flexion: 54 degrees   Inversion: WFL  Eversion: WFL    Right Ankle/Foot   Dorsiflexion (ke): 6 degrees   Dorsiflexion (kf): 11 degrees   Plantar flexion: 48 degrees   Inversion: WFL  Eversion: WFL    Strength/Myotome Testing     Left Hip   Planes of Motion   Flexion: 4  Abduction: 2  Adduction: 3+  External rotation: 4-  Internal rotation: 4+    Right Hip   Planes of Motion   Flexion: 4  Abduction: 2  Adduction: 3+  External rotation: 4-  Internal rotation: 4+    Left Knee  "  Flexion: 4  Extension: 2+    Right Knee   Flexion: 4  Extension: 2+    Left Ankle/Foot   Dorsiflexion: 2+  Plantar flexion: 4  Inversion: 4  Eversion: 4+    Right Ankle/Foot   Dorsiflexion: 2+  Plantar flexion: 5  Inversion: 4+  Eversion: 4+    Tests     Additional Tests Details  Hamstring Flexibility:  R: -35  L:  -35    Functional Assessment        Single Leg Stance - Eyes Open   Left  Trial 1: 1.87 seconds    Right  Trial 1: 14.71 seconds             Precautions: DM2, Bipolar    Manuals 11/20        Ankle PROM  ADD                                  Neuro Re-Ed         Rhomberg Stance on foam  ADD       Sharpened Rhomberg          Tandem Stance         BAPS Circles   ADD      BAPS Clocks                            Ther Ex         Elliptical  ADD       HR/TR         Ankle ABC's (kf/ke)         Ankle 4-way         DF Wall Stretch 30” hold 3x ea        LAQ         SLR         VMO SLR         Hamstring Stretch 30\" hold   ADD                        Ther Activity         Side Steps   ADD       Monster Walks         Step Ups         Ecc Step Downs         Mini Squats 30x                          Gait Training                                  "

## 2024-11-21 ENCOUNTER — OFFICE VISIT (OUTPATIENT)
Dept: PHYSICAL THERAPY | Facility: CLINIC | Age: 64
End: 2024-11-21
Payer: COMMERCIAL

## 2024-11-21 DIAGNOSIS — M77.32 BONE SPUR OF POSTERIOR PORTION OF LEFT CALCANEUS: ICD-10-CM

## 2024-11-21 DIAGNOSIS — M25.572 CHRONIC PAIN OF LEFT ANKLE: Primary | ICD-10-CM

## 2024-11-21 DIAGNOSIS — M77.52 ENTHESOPATHY OF LEFT ANKLE: ICD-10-CM

## 2024-11-21 DIAGNOSIS — G89.29 CHRONIC PAIN OF LEFT ANKLE: Primary | ICD-10-CM

## 2024-11-21 PROCEDURE — 97110 THERAPEUTIC EXERCISES: CPT

## 2024-11-21 PROCEDURE — 97140 MANUAL THERAPY 1/> REGIONS: CPT

## 2024-11-21 NOTE — PROGRESS NOTES
"Daily Note     Today's date: 2024  Patient name: Maia Noyola  : 1960  MRN: [id number]  Referring provider: Khanh Quinn D*  Dx:   Encounter Diagnosis     ICD-10-CM    1. Chronic pain of left ankle  M25.572     G89.29       2. Enthesopathy of left ankle  M77.52       3. Bone spur of posterior portion of left calcaneus  M77.32                      Subjective: patient stated the ankle is a little sore.       Objective: See treatment diary below      Assessment: Educated patient on POC established for their specific diagnosis and their LOF. Verbal and visual cues required for proper execution of exercise with program. Patient could be challenged more than Rhomberg stance next visit.  Overall tolerated treatment well. Will continue to progress in upcoming visits as able      Plan: Continue per plan of care.  Progress treatment as tolerated.       Precautions: DM2, Bipolar    Manuals        Left Ankle PROM  ADD                                  Neuro Re-Ed         Rhomberg Stance on foam  3x       Sharpened Rhomberg    Add      Tandem Stance         BAPS Circles   ADD      BAPS Clocks                            Ther Ex         Elliptical  L1 4 min       HR/TR         Ankle ABC's (kf/ke)         Ankle 4-way         DF Wall Stretch 30” hold 3x ea 3x       LAQ         SLR         VMO SLR         Hamstring Stretch 30\" hold   ADD                        Ther Activity         Side Steps   6x 10 ft       Monster Walks         Step Ups         Ecc Step Downs         Mini Squats 30x 30x                          Gait Training                                                                       "

## 2024-11-26 ENCOUNTER — APPOINTMENT (OUTPATIENT)
Dept: PHYSICAL THERAPY | Facility: CLINIC | Age: 64
End: 2024-11-26
Payer: COMMERCIAL

## 2024-11-26 DIAGNOSIS — M77.32 BONE SPUR OF POSTERIOR PORTION OF LEFT CALCANEUS: ICD-10-CM

## 2024-11-26 DIAGNOSIS — M25.572 CHRONIC PAIN OF LEFT ANKLE: Primary | ICD-10-CM

## 2024-11-26 DIAGNOSIS — M77.52 ENTHESOPATHY OF LEFT ANKLE: ICD-10-CM

## 2024-11-26 DIAGNOSIS — G89.29 CHRONIC PAIN OF LEFT ANKLE: Primary | ICD-10-CM

## 2024-11-26 NOTE — PROGRESS NOTES
"Daily Note     Today's date: 2024  Patient name: Maia Noyola  : 1960  MRN: [id number]  Referring provider: Khanh Quinn D*  Dx:   Encounter Diagnosis     ICD-10-CM    1. Chronic pain of left ankle  M25.572     G89.29       2. Enthesopathy of left ankle  M77.52       3. Bone spur of posterior portion of left calcaneus  M77.32                      Subjective: ***      Objective: See treatment diary below      Assessment: Patient able to progress with POC as indicated in flow sheet with no adverse symptoms. Patient has good recall of exercise program. Moderate exertion with today's intervention. Will continue to monitor and progress as able.       Plan: see flow sheet for recommended progressions     Precautions: DM2, Bipolar    Manuals       Left Ankle PROM  ADD                                  Neuro Re-Ed         Rhomberg Stance on foam  3x       Sharpened Rhomberg    Add      Tandem Stance         BAPS Circles   ADD      BAPS Clocks                            Ther Ex         Elliptical  L1 4 min       HR/TR         Ankle ABC's (kf/ke)         Ankle 4-way         DF Wall Stretch 30” hold 3x ea 3x       LAQ         SLR         VMO SLR         Hamstring Stretch 30\" hold   ADD                        Ther Activity         Side Steps   6x 10 ft       Monster Walks         Step Ups         Ecc Step Downs         Mini Squats 30x 30x                          Gait Training                                                                         "

## 2024-11-27 ENCOUNTER — OFFICE VISIT (OUTPATIENT)
Dept: PHYSICAL THERAPY | Facility: CLINIC | Age: 64
End: 2024-11-27
Payer: COMMERCIAL

## 2024-11-27 DIAGNOSIS — M77.32 BONE SPUR OF POSTERIOR PORTION OF LEFT CALCANEUS: ICD-10-CM

## 2024-11-27 DIAGNOSIS — M25.572 CHRONIC PAIN OF LEFT ANKLE: Primary | ICD-10-CM

## 2024-11-27 DIAGNOSIS — M77.52 ENTHESOPATHY OF LEFT ANKLE: ICD-10-CM

## 2024-11-27 DIAGNOSIS — G89.29 CHRONIC PAIN OF LEFT ANKLE: Primary | ICD-10-CM

## 2024-11-27 PROCEDURE — 97140 MANUAL THERAPY 1/> REGIONS: CPT

## 2024-11-27 PROCEDURE — 97110 THERAPEUTIC EXERCISES: CPT

## 2024-11-27 NOTE — PROGRESS NOTES
"Daily Note     Today's date: 2024  Patient name: Maia Noyola  : 1960  MRN: [id number]  Referring provider: Khanh Quinn D*  Dx:   Encounter Diagnosis     ICD-10-CM    1. Chronic pain of left ankle  M25.572     G89.29       2. Enthesopathy of left ankle  M77.52       3. Bone spur of posterior portion of left calcaneus  M77.32                      Subjective: patient stated she felt good after last visit but was hurting on Monday from being on her feet at work.      Objective: See treatment diary below      Assessment: Patient able to progress with POC as indicated in flow sheet with no adverse symptoms. Patient has good recall of exercise program. Moderate exertion with today's intervention. Will continue to monitor and progress as able.     Plan: Continue per plan of care.  Progress treatment as tolerated.       Precautions: DM2, Bipolar    Manuals       Left Ankle PROM  ADD 10'                                 Neuro Re-Ed         Rhomberg Stance on foam  3x       Sharpened Rhomberg    3x 30 \"hd  Foam      Tandem Stance         BAPS Circles   10x  CW  CCW      BAPS Clocks                            Ther Ex         Elliptical  L1 4 min L1 5 min       HR/TR         Ankle ABC's (kf/ke)         Ankle 4-way         DF Wall Stretch 30” hold 3x ea 3x 3x      LAQ         SLR         VMO SLR         Hamstring Stretch 30\" hold   3x                        Ther Activity         Side Steps   6x 10 ft 6x 10 ft      Monster Walks         Step Ups         Ecc Step Downs         Mini Squats 30x 30x  30x                        Gait Training                                                                         "

## 2024-11-29 ENCOUNTER — TELEPHONE (OUTPATIENT)
Age: 64
End: 2024-11-29

## 2024-11-29 NOTE — TELEPHONE ENCOUNTER
Patient calling for refill of   linaCLOtide (Linzess) 72 MCG CAPS . Medication is not currently on active med list but patient is currently taking and is requesting script be sent to [facility].

## 2024-12-02 DIAGNOSIS — K59.00 CONSTIPATION, UNSPECIFIED CONSTIPATION TYPE: Primary | ICD-10-CM

## 2024-12-02 RX ORDER — LINACLOTIDE 72 UG/1
72 CAPSULE, GELATIN COATED ORAL DAILY
Qty: 90 CAPSULE | Refills: 1 | Status: SHIPPED | OUTPATIENT
Start: 2024-12-02

## 2024-12-03 ENCOUNTER — OFFICE VISIT (OUTPATIENT)
Dept: PHYSICAL THERAPY | Facility: CLINIC | Age: 64
End: 2024-12-03
Payer: COMMERCIAL

## 2024-12-03 DIAGNOSIS — M25.572 CHRONIC PAIN OF LEFT ANKLE: Primary | ICD-10-CM

## 2024-12-03 DIAGNOSIS — M77.32 BONE SPUR OF POSTERIOR PORTION OF LEFT CALCANEUS: ICD-10-CM

## 2024-12-03 DIAGNOSIS — G89.29 CHRONIC PAIN OF LEFT ANKLE: Primary | ICD-10-CM

## 2024-12-03 DIAGNOSIS — M77.52 ENTHESOPATHY OF LEFT ANKLE: ICD-10-CM

## 2024-12-03 PROCEDURE — 97530 THERAPEUTIC ACTIVITIES: CPT

## 2024-12-03 PROCEDURE — 97140 MANUAL THERAPY 1/> REGIONS: CPT

## 2024-12-03 PROCEDURE — 97110 THERAPEUTIC EXERCISES: CPT

## 2024-12-03 NOTE — PROGRESS NOTES
"Daily Note     Today's date: 12/3/2024  Patient name: Maia Noyola  : 1960  MRN: [id number]  Referring provider: Khanh Quinn D*  Dx:   Encounter Diagnosis     ICD-10-CM    1. Chronic pain of left ankle  M25.572     G89.29       2. Enthesopathy of left ankle  M77.52       3. Bone spur of posterior portion of left calcaneus  M77.32                      Subjective: patient with no complaints since last visit.       Objective: See treatment diary below      Assessment: patient did well with sharpened Rhomberg on foam and should be able to advance next visit. Patients knee bothered with squats so broke it down to 2 sets. Limited DF activation with TR      Plan: Continue per plan of care.  Progress treatment as tolerated.       Precautions: DM2, Bipolar    Manuals  12     Left Ankle PROM  ADD 10' 12'                                Neuro Re-Ed         Rhomberg Stance on foam  3x       Sharpened Rhomberg    3x 30 \"hd  Foam   3x 30\"   Hd      Tandem Stance     add    BAPS Circles   10x  CW  CCW 15x  CW  CCW     BAPS Clocks                            Ther Ex         Elliptical  L1 4 min L1 5 min  L1 6 min     HR/TR    20x      Ankle ABC's (kf/ke)         Ankle 4-way         DF Wall Stretch 30” hold 3x ea 3x 3x 3x     LAQ         SLR         VMO SLR         Hamstring Stretch 30\" hold   3x 3x                       Ther Activity         Side Steps   6x 10 ft 6x 10 ft 6x 10ft     Monster Walks         Step Ups         Ecc Step Downs         Mini Squats 30x 30x  30x 20x  10x                       Gait Training                                                                           "

## 2024-12-04 ENCOUNTER — APPOINTMENT (OUTPATIENT)
Dept: PHYSICAL THERAPY | Facility: CLINIC | Age: 64
End: 2024-12-04
Payer: COMMERCIAL

## 2024-12-05 ENCOUNTER — OFFICE VISIT (OUTPATIENT)
Dept: PHYSICAL THERAPY | Facility: CLINIC | Age: 64
End: 2024-12-05
Payer: COMMERCIAL

## 2024-12-05 DIAGNOSIS — M25.572 CHRONIC PAIN OF LEFT ANKLE: Primary | ICD-10-CM

## 2024-12-05 DIAGNOSIS — M77.32 BONE SPUR OF POSTERIOR PORTION OF LEFT CALCANEUS: ICD-10-CM

## 2024-12-05 DIAGNOSIS — G89.29 CHRONIC PAIN OF LEFT ANKLE: Primary | ICD-10-CM

## 2024-12-05 DIAGNOSIS — M77.52 ENTHESOPATHY OF LEFT ANKLE: ICD-10-CM

## 2024-12-05 PROCEDURE — 97110 THERAPEUTIC EXERCISES: CPT

## 2024-12-05 PROCEDURE — 97140 MANUAL THERAPY 1/> REGIONS: CPT

## 2024-12-05 PROCEDURE — 97112 NEUROMUSCULAR REEDUCATION: CPT

## 2024-12-06 ENCOUNTER — APPOINTMENT (OUTPATIENT)
Dept: PHYSICAL THERAPY | Facility: CLINIC | Age: 64
End: 2024-12-06
Payer: COMMERCIAL

## 2024-12-10 ENCOUNTER — OFFICE VISIT (OUTPATIENT)
Dept: PODIATRY | Facility: CLINIC | Age: 64
End: 2024-12-10
Payer: COMMERCIAL

## 2024-12-10 ENCOUNTER — OFFICE VISIT (OUTPATIENT)
Dept: PHYSICAL THERAPY | Facility: CLINIC | Age: 64
End: 2024-12-10
Payer: COMMERCIAL

## 2024-12-10 DIAGNOSIS — G89.29 CHRONIC PAIN OF LEFT ANKLE: Primary | ICD-10-CM

## 2024-12-10 DIAGNOSIS — M77.32 BONE SPUR OF POSTERIOR PORTION OF LEFT CALCANEUS: ICD-10-CM

## 2024-12-10 DIAGNOSIS — M77.52 ENTHESOPATHY OF LEFT ANKLE: ICD-10-CM

## 2024-12-10 DIAGNOSIS — M25.572 CHRONIC PAIN OF LEFT ANKLE: Primary | ICD-10-CM

## 2024-12-10 PROCEDURE — 97530 THERAPEUTIC ACTIVITIES: CPT

## 2024-12-10 PROCEDURE — 97140 MANUAL THERAPY 1/> REGIONS: CPT

## 2024-12-10 PROCEDURE — 99213 OFFICE O/P EST LOW 20 MIN: CPT | Performed by: PODIATRIST

## 2024-12-10 PROCEDURE — 97110 THERAPEUTIC EXERCISES: CPT

## 2024-12-10 PROCEDURE — 97112 NEUROMUSCULAR REEDUCATION: CPT

## 2024-12-10 NOTE — PROGRESS NOTES
Assessment/Plan:      Diagnoses and all orders for this visit:    Chronic pain of left ankle    Enthesopathy of left ankle    Bone spur of posterior portion of left calcaneus      Reviewed PT notes. She is getting good progress. Her repeat FOTO score should be next week. As long as she keeps improving, no need to escalate care. We did discuss the surgery if necessary.     RTC 6-8 weeks to ensure resolution  Subjective:     Patient ID: Maia Noyola is a 64 y.o. female.    F/u POSTERIOR HEEL SPUR LEFT ANKLE. sHE HAS BEEN IN pt AND IS SEEING GOOD PROGRESS. tHE HEEL CUPS DIDN'T HELP BUT THE STRETCHING AND PT HAVE HELPED A LOT        Review of Systems    As stated in HPI, otherwise normal    Medical History Reviewed by provider this encounter:  Tobacco  Allergies  Meds  Problems  Med Hx  Surg Hx  Fam Hx      Objective:     Physical Exam  Vitals reviewed.   Cardiovascular:      Pulses: Normal pulses.   Musculoskeletal:         General: Deformity (palpable spurring posterior left ankle. Minimal discomfort.) present.   Neurological:      Mental Status: She is alert.

## 2024-12-10 NOTE — PROGRESS NOTES
"Daily Note     Today's date: 12/10/2024  Patient name: Maia Noyola  : 1960  MRN: [id number]  Referring provider: Khanh Quinn D*  Dx:   Encounter Diagnosis     ICD-10-CM    1. Chronic pain of left ankle  M25.572     G89.29       2. Enthesopathy of left ankle  M77.52       3. Bone spur of posterior portion of left calcaneus  M77.32                      Subjective: patient offered no complaints       Objective: See treatment diary below      Assessment: Patient able to progress with repetitions and resistance with various exercises. Placed side step inbetween sets of squats due to the difficulty of the squats  Good tolerance with progression with minimal cues required. Patient continues to present with deficits with strength and ROM requiring continued skilled physical therapy    Plan: Continue per plan of care.  Progress treatment as tolerated.       Precautions: DM2, Bipolar    Manuals 11/20 11/21 11/27 12/2 12/5 12/10   Left Ankle PROM  ADD 10' 12' 12' 15'                              Neuro Re-Ed         Rhomberg Stance on foam  3x       Sharpened Rhomberg    3x 30 \"hd  Foam   3x 30\"   Hd      Tandem Stance     Foam  3x 30\"  Foam   3x 30\" hd    BAPS Circles   10x  CW  CCW 15x  CW  CCW 15x  CW  CCW 15x  CW  CW   BAPS Clocks      SS 15x 20x   SS  FB   Rockerboard                   Ther Ex         Elliptical  L1 4 min L1 5 min  L1 6 min L1 6 min  L2 6 min    HR/TR    20x  20x 20x   Ankle ABC's (kf/ke)      1x   Ankle 4-way         DF Wall Stretch 30” hold 3x ea 3x 3x 3x 3x 3x   LAQ         SLR         VMO SLR         Hamstring Stretch 30\" hold   3x 3x 3X 3x                     Ther Activity         Side Steps   6x 10 ft 6x 10 ft 6x 10ft 6x 10 ft  6x 10 ft    Monster Walks         Step Ups         Ecc Step Downs         Mini Squats 30x 30x  30x 20x  10x 2x15 2x15                     Gait Training                                                                               "

## 2024-12-11 ENCOUNTER — APPOINTMENT (OUTPATIENT)
Dept: PHYSICAL THERAPY | Facility: CLINIC | Age: 64
End: 2024-12-11
Payer: COMMERCIAL

## 2024-12-11 ENCOUNTER — OFFICE VISIT (OUTPATIENT)
Dept: PSYCHIATRY | Facility: CLINIC | Age: 64
End: 2024-12-11
Payer: COMMERCIAL

## 2024-12-11 DIAGNOSIS — F31.81 BIPOLAR II DISORDER (HCC): Primary | ICD-10-CM

## 2024-12-11 PROCEDURE — 99214 OFFICE O/P EST MOD 30 MIN: CPT | Performed by: NURSE PRACTITIONER

## 2024-12-11 NOTE — PSYCH
PROGRESS NOTE        [facility]      Name and Date of Birth:  Maia Noyola 64 y.o. 1960    Date of Visit: 12/11/24    SUBJECTIVE: Patient is a 64-year-old female has a history of bipolar 2 disorder.  Doing remarkably well on her current medication regimen.  Anxiety and depression and mood instability under good control.  She enjoys life with her  as they are both retired.  They have a pet dog, Asha, who the I do her dearly.  Also getting along well with her children and grandchildren and extended family.  Looking forward to the holidays.  Offering no other concerns.  Continue current treatment and follow-up with me in 3 months or sooner if necessary.      She denies suicidal ideation, intent or plan at present, has no suicidal ideation, intent or plan at present.    She denies any auditory hallucinations and visual hallucinations, denies any other delusional thinking, denies any delusional thinking.    She denies any side effects from medications  .  HPI ROS Appetite Changes and Sleep: normal appetite, normal sleep    Review Of Systems:      Constitutional Negative   ENT Negative   Cardiovascular Negative   Respiratory As Noted in HPI   Gastrointestinal Negative   Genitourinary Negative   Musculoskeletal Negative   Integumentary Negative   Neurological Negative   Endocrine Negative   Other Symptoms Negative and None       Laboratory Results: No results found for this or any previous visit.    Substance Abuse History:    Social History     Substance and Sexual Activity   Drug Use No       Family Psychiatric History:     Family History   Problem Relation Age of Onset    Asthma Mother     No Known Problems Father     Rectal cancer Sister 58    No Known Problems Daughter     No Known Problems Maternal Grandmother     No Known Problems Maternal Grandfather     No Known Problems Paternal Grandmother     No Known Problems Paternal Grandfather     No Known  Problems Paternal Aunt        The following portions of the patient's history were reviewed and updated as appropriate: past family history, past medical history, past social history, past surgical history and problem list.    Social History     Socioeconomic History    Marital status: /Civil Union     Spouse name: Not on file    Number of children: Not on file    Years of education: Not on file    Highest education level: Not on file   Occupational History    Not on file   Tobacco Use    Smoking status: Never    Smokeless tobacco: Never   Vaping Use    Vaping status: Never Used   Substance and Sexual Activity    Alcohol use: Yes     Comment: rare    Drug use: No    Sexual activity: Not on file   Other Topics Concern    Not on file   Social History Narrative    Not on file     Social Drivers of Health     Financial Resource Strain: Not on file   Food Insecurity: Not on file   Transportation Needs: Not on file   Physical Activity: Not on file   Stress: Not on file   Social Connections: Not on file   Intimate Partner Violence: Not on file   Housing Stability: Not on file     Social History     Social History Narrative    Not on file        Social History       Tobacco History       Smoking Status  Never      Smokeless Tobacco Use  Never              Alcohol History       Alcohol Use Status  Yes Comment  rare              Drug Use       Drug Use Status  No              Sexual Activity       Sexually Active  Not Asked              Other Factors    Not Asked                       OBJECTIVE:     Mental Status Evaluation:    Appearance age appropriate, casually dressed   Behavior pleasant, cooperative   Speech normal volume, normal pitch   Mood Stable mood   Affect Bright affect   Thought Processes logical   Associations intact associations   Thought Content No overt delusions   Perceptual Disturbances: none   Abnormal Thoughts  Risk Potential Suicidal ideation - None  Homicidal ideation - None  Potential for  aggression - No   Orientation oriented to person, place, time/date and situation   Memory recent and remote memory grossly intact   Cosciousness alert and awake   Attention Span attention span and concentration are age appropriate   Intellect Appears to be of Average Intelligence   Insight Good   Judgement Good   Muscle Strength and  Gait muscle strength and tone were normal   Language no difficulty naming common objects   Fund of Knowledge displays adequate knowledge of current events   Pain none   Pain Scale 0       Assessment/Plan:   The patient will continue on:  Wellbutrin  mg daily  Lexapro 5 mg daily  Gabapentin 400 mg twice daily  Trazodone 100 mg, may take up to 2 tabs at bedtime if needed for sleep  Follow-up with me in 3 months or sooner if necessary.       Assessment & Plan  Bipolar II disorder (HCC)  Wellbutrin  mg daily  Lexapro 5 mg daily  Gabapentin 400 mg twice daily  Trazodone 100 mg take up to 2 tabs at bedtime if needed for sleep  Follow-up with me in 3 months or sooner if necessary.                    Treatment Recommendations/Precautions:    Treatment status: Patient is reporting good mood.  So far mood is stable and patient remains active and social.  Continue current treatment.    Risks/Benefits      Risks, Benefits And Possible Side Effects Of Medications:    Risks, benefits, and possible side effects of medications explained to patient and patient verbalizes understanding and agreement for treatment.    Controlled Medication Discussion:     Not applicable    Psychotherapy Provided:     Individual psychotherapy provided: No.  The visit started at 11 AM and ended at 11:25 AM.  Time spent reviewing the treatment plan, reviewing medications, ordering medications and completing the progress note.

## 2024-12-11 NOTE — ASSESSMENT & PLAN NOTE
Wellbutrin  mg daily  Lexapro 5 mg daily  Gabapentin 400 mg twice daily  Trazodone 100 mg take up to 2 tabs at bedtime if needed for sleep  Follow-up with me in 3 months or sooner if necessary.

## 2024-12-12 ENCOUNTER — OFFICE VISIT (OUTPATIENT)
Dept: PHYSICAL THERAPY | Facility: CLINIC | Age: 64
End: 2024-12-12
Payer: COMMERCIAL

## 2024-12-12 DIAGNOSIS — M25.572 CHRONIC PAIN OF LEFT ANKLE: Primary | ICD-10-CM

## 2024-12-12 DIAGNOSIS — M77.52 ENTHESOPATHY OF LEFT ANKLE: ICD-10-CM

## 2024-12-12 DIAGNOSIS — G89.29 CHRONIC PAIN OF LEFT ANKLE: Primary | ICD-10-CM

## 2024-12-12 PROCEDURE — 97140 MANUAL THERAPY 1/> REGIONS: CPT

## 2024-12-12 PROCEDURE — 97112 NEUROMUSCULAR REEDUCATION: CPT

## 2024-12-12 PROCEDURE — 97530 THERAPEUTIC ACTIVITIES: CPT

## 2024-12-12 PROCEDURE — 97110 THERAPEUTIC EXERCISES: CPT

## 2024-12-12 NOTE — PROGRESS NOTES
"Daily Note     Today's date: 2024  Patient name: Maia Noyola  : 1960  MRN: [id number]  Referring provider: Khanh Quinn D*  Dx:   Encounter Diagnosis     ICD-10-CM    1. Chronic pain of left ankle  M25.572     G89.29       2. Enthesopathy of left ankle  M77.52                      Subjective: Patient states he is doing well       Objective: See treatment diary below      Assessment: Patient progressing well towards goals. Good execution of exercise with program. Patient demonstrated the appropriate amount of fatigue for program.  Will continue to monitor pain levels and progress as able      Plan: Continue per plan of care.  Progress treatment as tolerated.       Precautions: DM2, Bipolar    Manuals 12/12  11/27 12/2 12/5 12/10   Left Ankle PROM 12'  10' 12' 12' 15'                              Neuro Re-Ed         Rhomberg Stance on foam         Sharpened Rhomberg    3x 30 \"hd  Foam   3x 30\"   Hd      Tandem Stance on foam 30\" hd  3x    Foam  3x 30\"  Foam   3x 30\" hd    BAPS Circles 15x  CW  CCW  10x  CW  CCW 15x  CW  CCW 15x  CW  CCW 15x  CW  CW   BAPS Clocks          Rockerboard  20x  SS  FB    SS 15x  20x   SS  FB            Ther Ex         Elliptical L2 6 min  L1 5 min  L1 6 min L1 6 min  L2 6 min    HR/TR 20x   20x  20x 20x   Stair TR/HR 4\" step  10x 10\" hd         Ankle ABC's (kf/ke) 1x     1x   Ankle 4-way         DF Wall Stretch 30” hold Missed   3x 3x 3x 3x   LAQ         SLR         VMO SLR         Hamstring Stretch 30\" hold 3x  3x 3x 3X 3x                     Ther Activity         Side Steps  6x 10 ft   6x 10 ft 6x 10ft 6x 10 ft  6x 10 ft    Monster Walks         Step Ups         Ecc Step Downs         Mini Squats 2x15  30x 20x  10x 2x15 2x15                     Gait Training                                                                                 "

## 2024-12-17 ENCOUNTER — TELEPHONE (OUTPATIENT)
Age: 64
End: 2024-12-17

## 2024-12-17 NOTE — TELEPHONE ENCOUNTER
Maia Noyola called and  requested a call back to discuss her Wellbutrin 300 mg. She stated since the change in dosage she's been having horrible heartburn. Writer offered to warm transfer the patient to the nurses line for assistance. Patient declined and would like to speak with Kacey Treviño. .    They can be reached at P# [phone].       Thank you.

## 2024-12-18 ENCOUNTER — OFFICE VISIT (OUTPATIENT)
Dept: PHYSICAL THERAPY | Facility: CLINIC | Age: 64
End: 2024-12-18
Payer: COMMERCIAL

## 2024-12-18 DIAGNOSIS — M25.572 CHRONIC PAIN OF LEFT ANKLE: Primary | ICD-10-CM

## 2024-12-18 DIAGNOSIS — F33.1 MODERATE EPISODE OF RECURRENT MAJOR DEPRESSIVE DISORDER (HCC): Primary | ICD-10-CM

## 2024-12-18 DIAGNOSIS — G89.29 CHRONIC PAIN OF LEFT ANKLE: Primary | ICD-10-CM

## 2024-12-18 DIAGNOSIS — M77.52 ENTHESOPATHY OF LEFT ANKLE: ICD-10-CM

## 2024-12-18 DIAGNOSIS — M77.32 BONE SPUR OF POSTERIOR PORTION OF LEFT CALCANEUS: ICD-10-CM

## 2024-12-18 PROCEDURE — 97530 THERAPEUTIC ACTIVITIES: CPT

## 2024-12-18 PROCEDURE — 97140 MANUAL THERAPY 1/> REGIONS: CPT

## 2024-12-18 PROCEDURE — 97110 THERAPEUTIC EXERCISES: CPT

## 2024-12-18 PROCEDURE — 97112 NEUROMUSCULAR REEDUCATION: CPT

## 2024-12-18 RX ORDER — BUPROPION HYDROCHLORIDE 100 MG/1
TABLET, EXTENDED RELEASE ORAL
Qty: 90 TABLET | Refills: 1 | Status: SHIPPED | OUTPATIENT
Start: 2024-12-18 | End: 2024-12-26 | Stop reason: SDUPTHER

## 2024-12-18 NOTE — PROGRESS NOTES
"Daily Note     Today's date: 2024  Patient name: Maia Noyola  : 1960  MRN: [id number]  Referring provider: Khanh Quinn D*  Dx:   Encounter Diagnosis     ICD-10-CM    1. Chronic pain of left ankle  M25.572     G89.29       2. Enthesopathy of left ankle  M77.52       3. Bone spur of posterior portion of left calcaneus  M77.32                      Subjective: patient offered no complaints       Objective: See treatment diary below      Assessment: no hands needed for rocker board. Added foam to side steps with good execution. Patient progressing well towards goal.       Plan: Progress note during next visit.  Potential discharge next visit.     Precautions: DM2, Bipolar    Manuals 12/12 12/18  12/2 12/5 12/10   Left Ankle PROM 12' 12'  12' 12 15'                              Neuro Re-Ed         Rhomberg Stance on foam         Sharpened Rhomberg     Foam   3x 30\"   Hd      Tandem Stance on foam 30\" hd  3x  Foam  3x   Foam   Foam  3x 30\"  Foam   3x 30\" hd    BAPS Circles 15x  CW  CCW   15x  CW  CCW 15x  CW  CCW 15x  CW  CW   BAPS Clocks          Rockerboard  20x  SS  FB 20x  SS  FB   SS 15x  20x   SS  FB            Ther Ex         Elliptical L2 6 min L2 6 min  L1 6 min L1 6 min  L2 6 min    HR/TR 20x 2x20  20x  20x 20x   Stair TR/HR 4\" step  10x 10\" hd  4\" step  10x 10\" hd        Ankle ABC's (kf/ke) 1x     1x   Ankle 4-way         DF Wall Stretch 30” hold Missed    3x 3x 3x   LAQ         SLR         VMO SLR         Hamstring Stretch 30\" hold 3x   3x 3X 3x                     Ther Activity         Side Steps  6x 10 ft  8x 8 ft  On foam   6x 10ft 6x 10 ft  6x 10 ft    Monster Walks         Step Ups         Ecc Step Downs         Mini Squats 2x15 2x15 30x 20x  10x 2x15 2x15                     Gait Training                                                                                   "

## 2024-12-18 NOTE — TELEPHONE ENCOUNTER
Spoke with Evelia.  She has tried her 's Wellbutrin SR and the heartburn is gone.  She had been on SR version of the Wellbutrin prior but her insurance would not pay for 3 tabs per day.  So she is asking if I could call in a 1 month supply to her pharmacy and if necessary, she will pay out-of-pocket.  I completed that today.

## 2024-12-19 ENCOUNTER — TELEPHONE (OUTPATIENT)
Age: 64
End: 2024-12-19

## 2024-12-19 NOTE — TELEPHONE ENCOUNTER
Received PA request via [employer] for buPROPion (Wellbutrin SR) 100 mg 12 hr tablet     Pre provider note dated 12/18 patient stated she will pay out of pocket.

## 2024-12-19 NOTE — TELEPHONE ENCOUNTER
Received call from patient returning call from Nurse's Team. Writer successfully transferred patient to Nurse's line.

## 2024-12-19 NOTE — TELEPHONE ENCOUNTER
Taty called from [facility] in regard to PA that was submitted for Wellbrutrin SR 100mg. Taty is requesting documentation on the reason the current quantity patient is taking is not working for her. [phone]

## 2024-12-19 NOTE — TELEPHONE ENCOUNTER
Evelia returned Jaqueline's call. She said she is on her way to the pharmacy and wants to find out if it is $6 out of pocket using good [facility], or $6 through her insurance. She is going to call us back when she finds out.

## 2024-12-19 NOTE — TELEPHONE ENCOUNTER
Evelia called back and said she picked 1 month supply for $21 dollars. She would like a PA to be done.

## 2024-12-19 NOTE — TELEPHONE ENCOUNTER
[facility]llbut[facility]  [facility]     via    []CMM-KEY:   []Surescripts-Case ID #   []Availity-Auth ID # NDC #   []Faxed to plan   [x]Other website [facility] EOC:[id number]  []Phone call Case ID #     []PA sent as URGENT    All office notes, labs and other pertaining documents and studies sent. Clinical questions answered. Awaiting determination from insurance company.     Turnaround time for your insurance to make a decision on your Prior Authorization can take 7-21 business days.

## 2024-12-19 NOTE — TELEPHONE ENCOUNTER
Taty from [facility] called regarding the PA for pts Bupropion. They need some more information for the PA. Please reach out to Lynnette. Writer deleted number in error. It was an 800 number but the number she called in from is [phone]

## 2024-12-19 NOTE — TELEPHONE ENCOUNTER
Nurse left a message for Maia MCKENZIE [facility] [phone] - awaiting return call.

## 2024-12-20 ENCOUNTER — EVALUATION (OUTPATIENT)
Dept: PHYSICAL THERAPY | Facility: CLINIC | Age: 64
End: 2024-12-20
Payer: COMMERCIAL

## 2024-12-20 ENCOUNTER — TELEPHONE (OUTPATIENT)
Age: 64
End: 2024-12-20

## 2024-12-20 DIAGNOSIS — M25.572 CHRONIC PAIN OF LEFT ANKLE: Primary | ICD-10-CM

## 2024-12-20 DIAGNOSIS — M77.52 ENTHESOPATHY OF LEFT ANKLE: ICD-10-CM

## 2024-12-20 DIAGNOSIS — M77.32 BONE SPUR OF POSTERIOR PORTION OF LEFT CALCANEUS: ICD-10-CM

## 2024-12-20 DIAGNOSIS — G89.29 CHRONIC PAIN OF LEFT ANKLE: Primary | ICD-10-CM

## 2024-12-20 PROCEDURE — 97140 MANUAL THERAPY 1/> REGIONS: CPT

## 2024-12-20 PROCEDURE — 97110 THERAPEUTIC EXERCISES: CPT

## 2024-12-20 NOTE — TELEPHONE ENCOUNTER
Duplicate encounter created, please see telephone encounter from 12/19/24 regarding Wellbutrin  PA status. Please review patient's chart to see if there is already an encounter regarding the medication in question and to document anything regarding this medication in regards to anything regarding the authorization process etc before creating another encounter Thank You.

## 2024-12-20 NOTE — TELEPHONE ENCOUNTER
Called and spoke to Jenni at [facility] she stated that the PA was denied.   PA for Wellbutrin  mg  DENIED    Reason:(Screenshot if applicable)        Message sent to office clinical pool Yes    Denial letter scanned into Media Yes    Appeal started No (Provider will need to decide if appeal is warranted and send clinical documentation to Prior Authorization Team for initiation.)    **Please follow up with your patient regarding denial and next steps**

## 2024-12-20 NOTE — PROGRESS NOTES
PT Re-Evaluation     Today's date: 2024  Patient name: Maia Noyola  : 1960  MRN: [id number]  Referring provider: Khanh Quinn D*  Dx:   Encounter Diagnosis     ICD-10-CM    1. Chronic pain of left ankle  M25.572     G89.29       2. Enthesopathy of left ankle  M77.52       3. Bone spur of posterior portion of left calcaneus  M77.32                      Assessment  Impairments: abnormal muscle firing, abnormal or restricted ROM, activity intolerance, impaired physical strength, lacks appropriate home exercise program and pain with function    Assessment details: Maia Noyola is a 64-year-old female who presented to outpatient physical therapy services for chronic pain of the left ankle. Left ankle AROM improved with DF, INV, and EV, and she demonstrated strength that was WFL in all directions in the ankle. The extensor lag in the B/L knees decreased, indicating greater strength in the quadriceps for ambulation and balance. Her LLE SLS increased by 12 seconds on average, and she demonstrated improved LLE stance time during ambulation. Evelia stated that her symptoms and function are at 99.99% of her PLOF, and she would like to transition to an IHEP to manage any remaining symptoms or functional limitations. Maia was discharged from skilled physical therapy services, and she was provided with a comprehensive IHEP to assist in maintaining the left ankle AROM, strength, and activity tolerance obtained through therapy. Time was allotted for questions and concerns, and these were answered to Evelia's satisfaction. She was advised to contact her referring provider or PCP with regard to any changes in status.   Barriers to therapy: Time since onset  Understanding of Dx/Px/POC: good     Prognosis: good    Goals  Short Term Goals:    1. Initiate and advance HEP (Met)  2. AROM Left Ankle DF 10 degrees (Met)  3. SLS time 30 seconds (Not Met)    Long Term Goals:    1. Indep with HEP (Met)  2. Wear Shoes 8 hours  (Met)  3. Walking (Met)    Plan    Treatment plan discussed with: patient  Plan details: Evelia reviewed and agreed with the POC.         Subjective Evaluation    History of Present Illness  Mechanism of injury: Evelia reported that her left ankle is doing great, and she is at 99.99% of her PLOF. Her pain has resolved completely, and she does not have any current complaints. She explained that the bump on the inside of her foot completely went away. Donning shoes is not an issue, and she feels like she is back to normal. At this time, Evelia would like to transition to an [facility] to manage any remaining symptoms and limitations.   Quality of life: excellent    Patient Goals  Patient goals for therapy: decreased pain    Pain  No pain reported  Progression: resolved      Diagnostic Tests  X-ray: abnormal  Treatments  Previous treatment: medication  Current treatment: medication and physical therapy      Objective     Active Range of Motion   Left Knee   Flexion: 136 degrees   Extension: 0 degrees   Extensor lag: 3 degrees     Right Knee   Flexion: 122 degrees   Extension: 0 degrees   Extensor la degrees   Left Ankle/Foot   Dorsiflexion (ke): 9 degrees   Dorsiflexion (kf): 9 degrees   Plantar flexion: 52 degrees   Inversion: 30 degrees   Eversion: 12 degrees     Right Ankle/Foot   Dorsiflexion (ke): 6 degrees   Dorsiflexion (kf): 11 degrees   Plantar flexion: 48 degrees   Inversion: WFL  Eversion: WFL    Strength/Myotome Testing     Left Hip   Planes of Motion   Flexion: 4  Abduction: 3+  Adduction: 3+  External rotation: 4+  Internal rotation: 4+    Right Hip   Planes of Motion   Flexion: 4  Abduction: 3+  Adduction: 3+  External rotation: 4+  Internal rotation: 4+    Left Knee   Flexion: 5  Extension: 4  Quadriceps contraction: good    Right Knee   Flexion: 5  Extension: 4  Quadriceps contraction: good    Left Ankle/Foot   Dorsiflexion: 4+  Plantar flexion: 5  Inversion: 5  Eversion: 5    Right Ankle/Foot   Dorsiflexion:  "4  Plantar flexion: 5  Inversion: 5  Eversion: 5    Tests     Additional Tests Details  Hamstring Flexibility:  R: -35  L:  -35    Functional Assessment        Single Leg Stance - Eyes Open   Left  Trial 1: 8.77 seconds  Trial 2: 23.22 seconds  Trial 3: 16.64 seconds  Average: 16.21 seconds     Right  Trial 1: 9.23 seconds  Trial 2: 30.08 seconds  Trial 3: 17.92 seconds  Average: 19.08 seconds              Precautions: DM2, Bipolar    Manuals 12/12 12/18 12/20  12/5 12/10   Left Ankle PROM 12' 12'   12' 15'                              Neuro Re-Ed         Rhomberg Stance on foam         Sharpened Rhomberg          Tandem Stance on foam 30\" hd  3x  Foam  3x   Foam 3x  Foam  Foam  3x 30\"  Foam   3x 30\" hd    BAPS Circles 15x  CW  CCW    15x  CW  CCW 15x  CW  CW   BAPS Clocks          Rockerboard  20x  SS  FB 20x  SS  FB   SS 15x  20x   SS  FB            Ther Ex         Review of HEP/POC/Pt Education   15 min      Elliptical L2 6 min L2 6 min 6 min L2  L1 6 min  L2 6 min    HR/TR 20x 2x20 2x20  20x 20x   Stair TR/HR 4\" step  10x 10\" hd  4\" step  10x 10\" hd        Ankle ABC's (kf/ke) 1x     1x   Ankle 4-way         DF Wall Stretch 30” hold Missed     3x 3x   LAQ         SLR         VMO SLR         Hamstring Stretch 30\" hold 3x    3X 3x                     Ther Activity         Side Steps  6x 10 ft  8x 8 ft  On foam    6x 10 ft  6x 10 ft    Monster Walks         Step Ups         Ecc Step Downs         Mini Squats 2x15 2x15 2x15  2x15 2x15                     Gait Training                                  "

## 2024-12-20 NOTE — TELEPHONE ENCOUNTER
Duplicate encounter created, please see telephone encounter from 12/19/2024 regarding Wellbutrin  PA status. Please review patient's chart to see if there is already an encounter regarding the medication in question and to document anything regarding this medication in regards to anything regarding the authorization process etc before creating another encounter Thank You.

## 2024-12-20 NOTE — TELEPHONE ENCOUNTER
PA for Wellbutrin SR 100mg  APPEALED via     []CMM  []SS  [x]Letter sent to insurance via fax [phone] [facility]   []Other site or means     All necessary records sent. Will await response from insurance company    Turnaround time for a decision to be made on an appeal could take up to 30 business days

## 2024-12-23 ENCOUNTER — TELEPHONE (OUTPATIENT)
Age: 64
End: 2024-12-23

## 2024-12-23 NOTE — TELEPHONE ENCOUNTER
Teresa from [facility] called and needed some additional information on the PA for the patients Bupropion. She stated they received the PA but require some additional criteria. They faxed over what they are looking for if that information could be faxed to # [phone].

## 2024-12-26 DIAGNOSIS — F33.1 MODERATE EPISODE OF RECURRENT MAJOR DEPRESSIVE DISORDER (HCC): ICD-10-CM

## 2024-12-26 RX ORDER — BUPROPION HYDROCHLORIDE 100 MG/1
TABLET, EXTENDED RELEASE ORAL
Qty: 180 TABLET | Refills: 1 | Status: SHIPPED | OUTPATIENT
Start: 2024-12-26

## 2024-12-26 RX ORDER — BUPROPION HYDROCHLORIDE 100 MG/1
TABLET, EXTENDED RELEASE ORAL
Qty: 90 TABLET | Refills: 1 | Status: SHIPPED | OUTPATIENT
Start: 2024-12-26

## 2024-12-26 NOTE — TELEPHONE ENCOUNTER
PA Appeal for Wellbutrin SR 100mg  DENIED    Reason:(Screenshot if applicable)        Message sent to office clinical pool Yes    Denial letter scanned into Media Yes

## 2024-12-26 NOTE — TELEPHONE ENCOUNTER
Spoke to Dr. Claudia Medel with [facility], After reviewing the Appeal she stated that she would have to deny the appeal, and that she advised the pt try Wellbutrin 150 mg SR BID, and have documentation of that being tried and failed if the provider wants the pt to be on the Wellbutrin 100 SR TID. The plan does not cover this unless there is documentation of tried and failure with the higher dosing at BID. She will fax over a denial letter as well.

## 2024-12-26 NOTE — TELEPHONE ENCOUNTER
Timmy from [facility] called to give a decision regarding PA on Bupropion  mg tablet. It has been denied due to the quantity limit.    She states if the Rx is rewritten in two separate Rxs (First Rx for 100 mg and the second Rx for 200 mg it will not need a PA.

## 2024-12-26 NOTE — TELEPHONE ENCOUNTER
Dr Claudia Medel called from [facility] to speak with someone from the Prior Authorization department. Writer was able to warm transfer to the Priyanka David for assistance.

## 2025-02-12 DIAGNOSIS — F33.1 MODERATE EPISODE OF RECURRENT MAJOR DEPRESSIVE DISORDER (HCC): ICD-10-CM

## 2025-02-14 RX ORDER — BUPROPION HYDROCHLORIDE 100 MG/1
300 TABLET, EXTENDED RELEASE ORAL DAILY
Qty: 90 TABLET | Refills: 1 | OUTPATIENT
Start: 2025-02-14

## 2025-03-19 ENCOUNTER — OFFICE VISIT (OUTPATIENT)
Dept: PSYCHIATRY | Facility: CLINIC | Age: 65
End: 2025-03-19
Payer: COMMERCIAL

## 2025-03-19 DIAGNOSIS — F33.1 MODERATE EPISODE OF RECURRENT MAJOR DEPRESSIVE DISORDER (HCC): ICD-10-CM

## 2025-03-19 DIAGNOSIS — F31.81 BIPOLAR II DISORDER (HCC): Primary | ICD-10-CM

## 2025-03-19 PROCEDURE — 99213 OFFICE O/P EST LOW 20 MIN: CPT | Performed by: NURSE PRACTITIONER

## 2025-03-19 RX ORDER — BUPROPION HYDROCHLORIDE 100 MG/1
TABLET, EXTENDED RELEASE ORAL
Qty: 180 TABLET | Refills: 1 | Status: SHIPPED | OUTPATIENT
Start: 2025-03-19

## 2025-03-19 NOTE — PSYCH
"TREATMENT PLAN (Medication Management Only)        [facility]    Name and Date of Birth:  Maia MCKENZIE Tkach 64 y.o. 1960  Date of Treatment Plan: March 19, 2025  Diagnosis/Diagnoses:    1. Bipolar II disorder (HCC)    2. Moderate episode of recurrent major depressive disorder (HCC)      Strengths/Personal Resources for Self-Care: supportive family, supportive friends.  Area/Areas of need (in own words): \" I am doing well.  Has been very stressful month.\".  1. Long Term Goal: \" To continue to do well, keep anxiety under control and function at my best\".   Target Date: 1 year - 3/19/2026  Person/Persons responsible for completion of goal: Maia MCKENZIE and RUSSELL Ritter  2.  Short Term Objective (s) - How will we reach this goal?:   A.  Provider new recommended medication/dosage changes and/or continue medication(s): continue current medications as prescribed.  B.  N/A.    Target Date: 3 months - 6/19/2025  Person/Persons Responsible for Completion of Goal: Maia MCKENZIE and RUSSELL Ritter  Progress Towards Goals: Continuing Treatment  Treatment Modality: medication management every 3 months  Review due 6 months from date of this plan: 6 months - 9/19/2025  Expected length of service: maintenance unless revised  My Physician/PA/NP and I have developed this plan together and I agree to work on the goals and objectives. I understand the treatment goals that were developed for my treatment.  "

## 2025-03-19 NOTE — ASSESSMENT & PLAN NOTE
Gabapentin 400 mg twice daily  Trazodone 100 mg, may take half tablet and up to 2 tabs at bedtime as needed to help with sleep

## 2025-03-19 NOTE — PSYCH
MEDICATION MANAGEMENT NOTE    Name: Maia Noyola      : 1960      MRN: [id number]  Encounter Provider: RUSSELL Martinez  Encounter Date: 3/19/2025   Encounter department: [facility]    Insurance: [facility] REP / Plan: [employer]  REP / Product Type: Medicare PPO /      Reason for Visit:   Chief Complaint   Patient presents with    Medication Management    Follow-up   :  Assessment & Plan  Bipolar II disorder (HCC)  Gabapentin 400 mg twice daily  Trazodone 100 mg, may take half tablet and up to 2 tabs at bedtime as needed to help with sleep       Moderate episode of recurrent major depressive disorder (HCC)    Orders:    buPROPion (Wellbutrin SR) 100 mg 12 hr tablet; 2 tabs Daily  Wellbutrin  mg, 1 tab daily.  Lexapro 5 mg daily    Treatment Recommendations:    Educated about diagnosis and treatment modalities. Verbalizes understanding and agreement with the treatment plan.  Discussed self monitoring of symptoms, and symptom monitoring tools.  Discussed medications and if treatment adjustment was needed or desired.  Aware of 24 hour and weekend coverage for urgent situations accessed by calling [facility] main practice number  I am scheduling this patient out for greater than 3 months: No    Medications Risks/Benefits:      Risks, Benefits And Possible Side Effects Of Medications:    Risks, benefits, and possible side effects of medications explained to Evelia and she (or legal representative) verbalizes understanding and agreement for treatment.    Controlled Medication Discussion:     Not applicable      History of Present Illness     CC: Evelia presents today for follow up on 2025 for treatment of bipolar depressive disorder.  Currently, mood is stable.     Evelia presents today accompanied by her , Janis.  Evelia is doing well.  She has managed a very stressful month after Janis was hospitalized secondary to  nephrectomy.  Thankfully he is doing well.  She reports that she was mildly anxious through the whole process but knew he was in good hands and he was doing well throughout the whole procedure.  Offering no complaints or concerns.  She is sleeping well and eating well.  Follow-up with me in 3 months or sooner if necessary.    Med Compliance: yes    Since our last visit, overall symptoms have been stable.  No new complaints or concerns.  Anxiety and depression under good control.  No evidence of any mood instability.    HPI ROS:     Medication Side Effects: None  Depression: 0 /10 (10 worst)  Anxiety: 0 /10 (10 worst)  Safety concerns (SI, HI, others): None  Sleep: Good  Energy: Good  Appetite: Good  Weight Change: No change    Evelia denies any side effects from medications unless noted above.    Review Of Systems: A review of systems is obtained and is negative except for the pertinent positives listed in HPI/Subjective above.      Current Rating Scores:     None completed today.    Areas of Improvement: reviewed in HPI/Subjective Section and reviewed in Assessment and Plan Section      Past Medical History:   Diagnosis Date    Anemia     Anxiety     Arthritis     Back pain     Lower back    Bipolar 1 disorder (HCC) 4/29/2024    Depression     Diabetes (HCC)     Diabetes mellitus (HCC) 4/29/2024    Diverticulosis     Hip dysplasia     Bilateral    Hyperlipidemia     Knee pain, right     Nasal septal perforation     following a septoplasty    Obesity 4/29/2024    Psoriasis     Along hairline.    Stress incontinence         Past Surgical History:   Procedure Laterality Date    COLONOSCOPY      DILATION AND CURETTAGE OF UTERUS      One D&C and one D&E    ESOPHAGOGASTRODUODENOSCOPY      NASAL SEPTUM SURGERY      UT ARTHRS KNE SURG W/MENISCECTOMY MED/LAT W/SHVG Right 09/27/2018    Procedure: ARTHROSCOPY KNEE; PARTIAL MEDIAL MENISECTOMY; CHONDROPLASTY;  Surgeon: Lien Contreras DO;  Location: [facility];  Service:  Orthopedics    TONSILECTOMY AND ADNOIDECTOMY       Allergies:   Allergies   Allergen Reactions    Banana - Food Allergy Hives     Itching and swelling     Crab (Diagnostic) - Food Allergy Hives    Cleocin [Clindamycin]      Causes C Diff    Latex      Pt told not to be in contact with latex because of banana allergy    Penicillins Other (See Comments)     Causes vaginal itching and must take Diflucan with it       Current Outpatient Medications   Medication Sig Dispense Refill    buPROPion (Wellbutrin SR) 100 mg 12 hr tablet 1 tab Daily 90 tablet 1    buPROPion (Wellbutrin SR) 100 mg 12 hr tablet 2 tabs Daily 180 tablet 1    acetaminophen (TYLENOL) 500 mg tablet Take 500 mg by mouth every 6 (six) hours as needed for mild pain      Ascorbic Acid (Vitamin C) 100 MG CHEW       cyanocobalamin 1,000 mcg/mL inject 1 milliliter ( 1000 MCG ) subcutaneously Every Month      escitalopram (LEXAPRO) 5 mg tablet Take 1 tablet (5 mg total) by mouth daily 90 tablet 3    Ferrous Sulfate (Iron) 325 (65 Fe) MG TABS       gabapentin (NEURONTIN) 400 mg capsule Take 1 capsule (400 mg total) by mouth 2 (two) times a day 270 capsule 2    linaCLOtide (Linzess) 72 MCG CAPS Take 72 mcg by mouth in the morning 90 capsule 1    Multiple Vitamin (MULTIVITAMIN) capsule Take 1 capsule by mouth daily      oxybutynin (DITROPAN-XL) 10 MG 24 hr tablet Take 10 mg by mouth daily at bedtime      polyethylene glycol (MiraLax) 17 GM/SCOOP powder Take 17 g by mouth daily      rosuvastatin (CRESTOR) 10 MG tablet       traZODone (DESYREL) 100 mg tablet 1/2-2  tablet 3    Trulicity 0.75 MG/0.5ML SOPN inject 0.5 milliliters ( 0.75 milligrams ) subcutaneously every week       No current facility-administered medications for this visit.       Substance Abuse History:    Social History     Substance and Sexual Activity   Alcohol Use Yes    Comment: rare     Social History     Substance and Sexual Activity   Drug Use No       Social History:    Social History      Socioeconomic History    Marital status: /Civil Union     Spouse name: Not on file    Number of children: Not on file    Years of education: Not on file    Highest education level: Not on file   Occupational History    Not on file   Tobacco Use    Smoking status: Never    Smokeless tobacco: Never   Vaping Use    Vaping status: Never Used   Substance and Sexual Activity    Alcohol use: Yes     Comment: rare    Drug use: No    Sexual activity: Not on file   Other Topics Concern    Not on file   Social History Narrative    Not on file     Social Drivers of Health     Financial Resource Strain: Not on file   Food Insecurity: Not on file   Transportation Needs: Not on file   Physical Activity: Not on file   Stress: Not on file   Social Connections: Not on file   Intimate Partner Violence: Not on file   Housing Stability: Not on file       Family Psychiatric History:     Family History   Problem Relation Age of Onset    Asthma Mother     No Known Problems Father     Rectal cancer Sister 58    No Known Problems Daughter     No Known Problems Maternal Grandmother     No Known Problems Maternal Grandfather     No Known Problems Paternal Grandmother     No Known Problems Paternal Grandfather     No Known Problems Paternal Aunt        Medical History Reviewed by provider this encounter:  Tobacco  Allergies  Meds  Problems  Med Hx  Surg Hx  Fam Hx          Objective   There were no vitals taken for this visit.     Mental Status Evaluation:    Appearance age appropriate, casually dressed, dressed appropriately   Behavior cooperative, calm   Speech normal rate, normal volume, normal pitch, spontaneous   Mood euthymic   Affect normal range and intensity, appropriate   Thought Processes organized, goal directed, linear   Thought Content no overt delusions   Perceptual Disturbances: no auditory hallucinations, no visual hallucinations   Abnormal Thoughts  Risk Potential Suicidal ideation - None  Homicidal ideation  - None  Potential for aggression - No   Orientation oriented to person, place, time/date, and situation   Memory recent and remote memory grossly intact   Consciousness alert and awake   Attention Span Concentration Span attention span and concentration are age appropriate   Intellect appears to be of average intelligence   Insight intact   Judgement intact   Muscle Strength and  Gait normal muscle strength and normal muscle tone, normal gait and normal balance   Motor activity no abnormal movements   Language no difficulty naming common objects, no difficulty repeating a phrase, no difficulty writing a sentence   Fund of Knowledge adequate knowledge of current events  adequate fund of knowledge regarding past history  adequate fund of knowledge regarding vocabulary    Pain none   Pain Scale 0       Laboratory Results: I have personally reviewed all pertinent laboratory/tests results    Recent Labs (last 6 months):   Appointment on 10/25/2024   Component Date Value    WBC 10/25/2024 4.90     RBC 10/25/2024 5.28 (H)     Hemoglobin 10/25/2024 15.1     Hematocrit 10/25/2024 45.4     MCV 10/25/2024 86     MCH 10/25/2024 28.6     MCHC 10/25/2024 33.3     RDW 10/25/2024 13.1     Platelets 10/25/2024 159     MPV 10/25/2024 13.0 (H)     Sodium 10/25/2024 142     Potassium 10/25/2024 4.0     Chloride 10/25/2024 108     CO2 10/25/2024 28     ANION GAP 10/25/2024 6     BUN 10/25/2024 12     Creatinine 10/25/2024 0.86     Glucose, Fasting 10/25/2024 96     Calcium 10/25/2024 9.8     AST 10/25/2024 17     ALT 10/25/2024 14     Alkaline Phosphatase 10/25/2024 75     Total Protein 10/25/2024 6.9     Albumin 10/25/2024 4.3     Total Bilirubin 10/25/2024 0.74     eGFR 10/25/2024 71     Cholesterol 10/25/2024 116     Triglycerides 10/25/2024 105     HDL, Direct 10/25/2024 41 (L)     LDL Calculated 10/25/2024 54     Non-HDL-Chol (CHOL-HDL) 10/25/2024 75     Hemoglobin A1C 10/25/2024 5.3     EAG 10/25/2024 105     Iron Saturation  10/25/2024 22     TIBC 10/25/2024 301     Iron 10/25/2024 67     UIBC 10/25/2024 234     Ferritin 10/25/2024 53        Suicide/Homicide Risk Assessment:    Risk of Harm to Self:  Based on today's assessment, Evelia presents the following risk of harm to self: none    Risk of Harm to Others:  Based on today's assessment, Evelia presents the following risk of harm to others: none    The following interventions are recommended: Continue medication management. No other intervention changes indicated at this time.    Psychotherapy Provided:     Individual psychotherapy provided: No    Treatment Plan:    Completed and signed during the session: Not applicable - Treatment Plan not due at this session    Goals: Progress towards Treatment Plan goals - Yes, progressing, as evidenced by subjective findings in HPI/Subjective Section and in Assessment and Plan Section    Depression Follow-up Plan Completed: Yes    Note Share:    This note was shared with patient.    Administrative Statements   Administrative Statements   I have spent a total time of 30 minutes in caring for this patient on the day of the visit/encounter including Counseling / Coordination of care.    Visit Time  Visit Start Time: 10:55AM PM  Visit Stop Time: 11:15AM PM  Total Visit Duration:  20 minutes    RUSSELL Martinez 03/19/25

## 2025-03-19 NOTE — ASSESSMENT & PLAN NOTE
Orders:    buPROPion (Wellbutrin SR) 100 mg 12 hr tablet; 2 tabs Daily  Wellbutrin  mg, 1 tab daily.

## 2025-04-25 ENCOUNTER — APPOINTMENT (OUTPATIENT)
Dept: LAB | Facility: CLINIC | Age: 65
End: 2025-04-25
Payer: COMMERCIAL

## 2025-04-25 DIAGNOSIS — E11.9 TYPE 2 DIABETES MELLITUS WITHOUT COMPLICATION, UNSPECIFIED WHETHER LONG TERM INSULIN USE (HCC): ICD-10-CM

## 2025-04-25 LAB
ALBUMIN SERPL BCG-MCNC: 4.2 G/DL (ref 3.5–5)
ALP SERPL-CCNC: 77 U/L (ref 34–104)
ALT SERPL W P-5'-P-CCNC: 16 U/L (ref 7–52)
ANION GAP SERPL CALCULATED.3IONS-SCNC: 6 MMOL/L (ref 4–13)
AST SERPL W P-5'-P-CCNC: 16 U/L (ref 13–39)
BILIRUB SERPL-MCNC: 0.88 MG/DL (ref 0.2–1)
BUN SERPL-MCNC: 15 MG/DL (ref 5–25)
CALCIUM SERPL-MCNC: 9.8 MG/DL (ref 8.4–10.2)
CHLORIDE SERPL-SCNC: 109 MMOL/L (ref 96–108)
CHOLEST SERPL-MCNC: 116 MG/DL (ref ?–200)
CO2 SERPL-SCNC: 27 MMOL/L (ref 21–32)
CREAT SERPL-MCNC: 0.86 MG/DL (ref 0.6–1.3)
CREAT UR-MCNC: 184.1 MG/DL
ERYTHROCYTE [DISTWIDTH] IN BLOOD BY AUTOMATED COUNT: 13 % (ref 11.6–15.1)
EST. AVERAGE GLUCOSE BLD GHB EST-MCNC: 108 MG/DL
GFR SERPL CREATININE-BSD FRML MDRD: 71 ML/MIN/1.73SQ M
GLUCOSE P FAST SERPL-MCNC: 108 MG/DL (ref 65–99)
HBA1C MFR BLD: 5.4 %
HCT VFR BLD AUTO: 45.3 % (ref 34.8–46.1)
HDLC SERPL-MCNC: 39 MG/DL
HGB BLD-MCNC: 15.1 G/DL (ref 11.5–15.4)
LDLC SERPL CALC-MCNC: 56 MG/DL (ref 0–100)
MCH RBC QN AUTO: 28.7 PG (ref 26.8–34.3)
MCHC RBC AUTO-ENTMCNC: 33.3 G/DL (ref 31.4–37.4)
MCV RBC AUTO: 86 FL (ref 82–98)
MICROALBUMIN UR-MCNC: 14.1 MG/L
MICROALBUMIN/CREAT 24H UR: 8 MG/G CREATININE (ref 0–30)
NONHDLC SERPL-MCNC: 77 MG/DL
PLATELET # BLD AUTO: 136 THOUSANDS/UL (ref 149–390)
PMV BLD AUTO: 12.5 FL (ref 8.9–12.7)
POTASSIUM SERPL-SCNC: 4.1 MMOL/L (ref 3.5–5.3)
PROT SERPL-MCNC: 6.7 G/DL (ref 6.4–8.4)
RBC # BLD AUTO: 5.26 MILLION/UL (ref 3.81–5.12)
SODIUM SERPL-SCNC: 142 MMOL/L (ref 135–147)
TRIGL SERPL-MCNC: 106 MG/DL (ref ?–150)
WBC # BLD AUTO: 5.08 THOUSAND/UL (ref 4.31–10.16)

## 2025-04-25 PROCEDURE — 80053 COMPREHEN METABOLIC PANEL: CPT

## 2025-04-25 PROCEDURE — 36415 COLL VENOUS BLD VENIPUNCTURE: CPT

## 2025-04-25 PROCEDURE — 85027 COMPLETE CBC AUTOMATED: CPT

## 2025-04-25 PROCEDURE — 83036 HEMOGLOBIN GLYCOSYLATED A1C: CPT

## 2025-04-25 PROCEDURE — 82570 ASSAY OF URINE CREATININE: CPT

## 2025-04-25 PROCEDURE — 82043 UR ALBUMIN QUANTITATIVE: CPT

## 2025-04-25 PROCEDURE — 80061 LIPID PANEL: CPT

## 2025-05-06 DIAGNOSIS — K59.00 CONSTIPATION, UNSPECIFIED CONSTIPATION TYPE: ICD-10-CM

## 2025-05-06 NOTE — TELEPHONE ENCOUNTER
Reason for call:   [x] Refill   [] Prior Auth  [] Other:     Office:   [] PCP/Provider -   [x] Specialty/Provider - GASTRO SPCLST YADIEL     Medication: linaCLOtide (Linzess) 72 MCG CAPS     Dose/Frequency: 72 mcg daily    Quantity: 90    Pharmacy: [facility]   Does the patient have enough for 3 days?   [x] Yes   [] No - Send as HP to POD    Mail Away Pharmacy   Does the patient have enough for 10 days?   [x] Yes   [] No - Send as HP to POD

## 2025-05-07 RX ORDER — LINACLOTIDE 72 UG/1
72 CAPSULE, GELATIN COATED ORAL DAILY
Qty: 90 CAPSULE | Refills: 1 | Status: SHIPPED | OUTPATIENT
Start: 2025-05-07

## 2025-05-13 DIAGNOSIS — F31.9 BIPOLAR I DISORDER WITH DEPRESSION (HCC): ICD-10-CM

## 2025-05-13 RX ORDER — TRAZODONE HYDROCHLORIDE 100 MG/1
TABLET ORAL
Qty: 180 TABLET | Refills: 3 | Status: SHIPPED | OUTPATIENT
Start: 2025-05-13 | End: 2025-05-15 | Stop reason: SDUPTHER

## 2025-05-13 NOTE — TELEPHONE ENCOUNTER
Reason for call:   [x] Refill   [] Prior Auth  [] Other:     Office:   [] PCP/Provider -   [x] Specialty/Provider -  Kacey Treviño    Medication: Trazodone    Dose/Frequency: 100 mg  1/2 to 2 tablets HS     Quantity: 180    Pharmacy: [facility]    Local Pharmacy   Does the patient have enough for 3 days?   [] Yes   [] No - Send as HP to POD    Mail Away Pharmacy   Does the patient have enough for 10 days?   [x] Yes   [] No - Send as HP to POD

## 2025-05-15 ENCOUNTER — TELEPHONE (OUTPATIENT)
Age: 65
End: 2025-05-15

## 2025-05-15 DIAGNOSIS — F31.9 BIPOLAR I DISORDER WITH DEPRESSION (HCC): ICD-10-CM

## 2025-05-15 RX ORDER — TRAZODONE HYDROCHLORIDE 100 MG/1
TABLET ORAL
Qty: 90 TABLET | Refills: 0 | Status: SHIPPED | OUTPATIENT
Start: 2025-05-15 | End: 2025-05-15 | Stop reason: SDUPTHER

## 2025-05-15 RX ORDER — TRAZODONE HYDROCHLORIDE 100 MG/1
TABLET ORAL
Qty: 14 TABLET | Refills: 0 | Status: SHIPPED | OUTPATIENT
Start: 2025-05-15

## 2025-05-15 NOTE — TELEPHONE ENCOUNTER
Patient called the RX Refill Line. Message is being forwarded to the office.     Patient called and states that [facility] reached out and needs the directions changed for Trazodone 100 mg; will not accept the directions the way they are written. Patient would like script to say take 1 tablet at night and have the sent over to [facility] and needs a two week supply sent to [facility]    Please contact patient at [phone]

## 2025-05-15 NOTE — TELEPHONE ENCOUNTER
Called Evelia - she asked if another script can be sent for a 90 day supply to her mail order pharmacy. (Still needs the short term supply that was sent to [facility])  [facility] [place] [facility] - [address]

## 2025-05-16 ENCOUNTER — HOSPITAL ENCOUNTER (OUTPATIENT)
Dept: NON INVASIVE DIAGNOSTICS | Facility: HOSPITAL | Age: 65
Discharge: HOME | End: 2025-05-16
Attending: FAMILY MEDICINE
Payer: COMMERCIAL

## 2025-05-16 DIAGNOSIS — R42 DIZZINESS AND GIDDINESS: ICD-10-CM

## 2025-05-16 PROCEDURE — 93880 EXTRACRANIAL BILAT STUDY: CPT

## 2025-05-16 PROCEDURE — 93880 EXTRACRANIAL BILAT STUDY: CPT | Performed by: SURGERY

## 2025-05-19 DIAGNOSIS — F31.81 BIPOLAR II DISORDER (HCC): Primary | ICD-10-CM

## 2025-05-19 RX ORDER — GABAPENTIN 400 MG/1
400 CAPSULE ORAL 2 TIMES DAILY
Qty: 14 CAPSULE | Refills: 0 | Status: SHIPPED | OUTPATIENT
Start: 2025-05-19

## 2025-05-19 NOTE — TELEPHONE ENCOUNTER
Reason for call:   [x] Refill   [] Prior Auth  [x] Other: Requesting a week supply while waiting for mail order to be delivered.     Office:   [] PCP/Provider -   [x] Specialty/Provider -     Medication: Gabapentin 400 mg    Dose/Frequency: Take 1 capsule by mouth 2 times a day    Quantity: 14    Pharmacy: [facility] [place]     Local Pharmacy   Does the patient have enough for 3 days?   [] Yes   [x] No - Send as HP to POD

## 2025-05-22 DIAGNOSIS — F31.9 BIPOLAR I DISORDER WITH DEPRESSION (HCC): ICD-10-CM

## 2025-05-22 RX ORDER — TRAZODONE HYDROCHLORIDE 100 MG/1
TABLET ORAL
Qty: 14 TABLET | Refills: 0 | OUTPATIENT
Start: 2025-05-22

## 2025-05-23 RX ORDER — TRAZODONE HYDROCHLORIDE 100 MG/1
TABLET ORAL
Qty: 90 TABLET | Refills: 1 | OUTPATIENT
Start: 2025-05-23

## 2025-05-27 ENCOUNTER — TELEPHONE (OUTPATIENT)
Age: 65
End: 2025-05-27

## 2025-05-27 NOTE — TELEPHONE ENCOUNTER
Patient called and requested a letter from provider to be excused from jury duty for medical reasons. Please call back when completed.

## 2025-06-03 NOTE — TELEPHONE ENCOUNTER
Patient calling or status of letter of excuse for jury duty. She need the letter by 6/13/25.   Advised provider is out of office at this time.

## 2025-06-03 NOTE — TELEPHONE ENCOUNTER
Patient returned call and requested to know why she received missed call. Writer informed her to come to office to sign JIMENEZ. She reported that she will do that tomorrow.

## 2025-06-03 NOTE — TELEPHONE ENCOUNTER
Called and left a message notifying Evelia that she needs to sign a JIMENEZ for our office to be able to fax the Jury duty letter.

## 2025-06-25 ENCOUNTER — OFFICE VISIT (OUTPATIENT)
Dept: PSYCHIATRY | Facility: CLINIC | Age: 65
End: 2025-06-25
Payer: COMMERCIAL

## 2025-06-25 DIAGNOSIS — F33.1 MODERATE EPISODE OF RECURRENT MAJOR DEPRESSIVE DISORDER (HCC): ICD-10-CM

## 2025-06-25 DIAGNOSIS — F31.9 BIPOLAR I DISORDER WITH DEPRESSION (HCC): ICD-10-CM

## 2025-06-25 DIAGNOSIS — F31.81 BIPOLAR II DISORDER (HCC): ICD-10-CM

## 2025-06-25 PROCEDURE — 99214 OFFICE O/P EST MOD 30 MIN: CPT | Performed by: NURSE PRACTITIONER

## 2025-06-25 RX ORDER — BUPROPION HYDROCHLORIDE 100 MG/1
TABLET, EXTENDED RELEASE ORAL
Qty: 180 TABLET | Refills: 3 | Status: SHIPPED | OUTPATIENT
Start: 2025-06-25

## 2025-06-25 RX ORDER — BUPROPION HYDROCHLORIDE 100 MG/1
TABLET, EXTENDED RELEASE ORAL
Qty: 90 TABLET | Refills: 3 | Status: SHIPPED | OUTPATIENT
Start: 2025-06-25

## 2025-06-25 RX ORDER — ESCITALOPRAM OXALATE 5 MG/1
5 TABLET ORAL DAILY
Qty: 90 TABLET | Refills: 3 | Status: SHIPPED | OUTPATIENT
Start: 2025-06-25

## 2025-06-25 RX ORDER — GABAPENTIN 400 MG/1
400 CAPSULE ORAL 2 TIMES DAILY
Qty: 180 CAPSULE | Refills: 3 | Status: SHIPPED | OUTPATIENT
Start: 2025-06-25

## 2025-06-25 RX ORDER — TRAZODONE HYDROCHLORIDE 100 MG/1
TABLET ORAL
Qty: 90 TABLET | Refills: 3 | Status: SHIPPED | OUTPATIENT
Start: 2025-06-25

## 2025-06-25 NOTE — ASSESSMENT & PLAN NOTE
Orders:    gabapentin (NEURONTIN) 400 mg capsule; Take 1 capsule (400 mg total) by mouth 2 (two) times a day    buPROPion (Wellbutrin SR) 100 mg 12 hr tablet; 1 tab Daily  Lexapro 5 mg daily

## 2025-06-25 NOTE — PSYCH
MEDICATION MANAGEMENT NOTE    Name: Maia Noyola      : 1960      MRN: [id number]  Encounter Provider: RUSSELL Martinez  Encounter Date: 2025   Encounter department: [facility]    Insurance: [facility] / Plan: [facility] [employer]  REP / Product Type: Medicare PPO /      Reason for Visit:   Chief Complaint   Patient presents with    Medication Management    Follow-up   :  Assessment & Plan  Bipolar I disorder with depression (HCC)    Orders:    traZODone (DESYREL) 100 mg tablet; Take by mouth 1/2 (half) to 1 tablet at bedtime as needed for poor sleep.    Bipolar II disorder (HCC)    Orders:    gabapentin (NEURONTIN) 400 mg capsule; Take 1 capsule (400 mg total) by mouth 2 (two) times a day    buPROPion (Wellbutrin SR) 100 mg 12 hr tablet; 1 tab Daily  Lexapro 5 mg daily  The patient will continue on:  Wellbutrin  mg daily  Lexapro 5 mg daily  Gabapentin 400 mg twice daily  Trazodone 100 mg at bedtime as needed to help with sleep  Follow-up with me in 3 months or sooner if necessary.    Treatment Recommendations:    Educated about diagnosis and treatment modalities. Verbalizes understanding and agreement with the treatment plan.  Discussed self monitoring of symptoms, and symptom monitoring tools.  Discussed medications and if treatment adjustment was needed or desired.  Aware of 24 hour and weekend coverage for urgent situations accessed by calling [facility] main practice number  I am scheduling this patient out for greater than 3 months: No    Medications Risks/Benefits:      Risks, Benefits And Possible Side Effects Of Medications:    Risks, benefits, and possible side effects of medications explained to Evelia and she (or legal representative) verbalizes understanding and agreement for treatment.    Controlled Medication Discussion:     Not applicable      History of Present Illness     CC: Evelia presents today for  follow up on 06/25/2026 for treatment of bipolar disorder and anxiety disorder     Evelia presents today accompanied by her , Janis.  Evelia is doing well.  She and her family were just on vacation and she really enjoyed it.  [place]  She is sleeping well and eating well and enjoying life.  Offering no complaints today.  Continue current treatment and follow-up with me in 3 months or sooner if necessary.    Med Compliance: yes    Since our last visit, overall symptoms have been stable.       HPI ROS:     Medication Side Effects: None  Depression: 0 /10 (10 worst)  Anxiety: 2 /10 (10 worst)  Safety concerns (SI, HI, others): None  Sleep: Adequate  Energy: Adequate  Appetite: Adequate  Weight Change: No weight changes or fluctuations    Evelia denies any side effects from medications unless noted above.    Review Of Systems: A review of systems is obtained and is negative except for the pertinent positives listed in HPI/Subjective above.      Current Rating Scores:     None completed today.    Areas of Improvement: reviewed in HPI/Subjective Section and reviewed in Assessment and Plan Section      Past Medical History[1]  Past Surgical History[2]  Allergies: Allergies[3]    Current Outpatient Medications   Medication Instructions    acetaminophen (TYLENOL) 500 mg, Oral, Every 6 hours PRN    Ascorbic Acid (Vitamin C) 100 MG CHEW     buPROPion (Wellbutrin SR) 100 mg 12 hr tablet 2 tabs Daily    buPROPion (Wellbutrin SR) 100 mg 12 hr tablet 1 tab Daily    cyanocobalamin 1,000 mcg/mL inject 1 milliliter ( 1000 MCG ) subcutaneously Every Month    escitalopram (LEXAPRO) 5 mg, Oral, Daily    Ferrous Sulfate (Iron) 325 (65 Fe) MG TABS     gabapentin (NEURONTIN) 400 mg, Oral, 2 times daily    Linzess 72 mcg, Oral, Daily    Multiple Vitamin (MULTIVITAMIN) capsule 1 capsule, Oral, Daily    oxybutynin (DITROPAN-XL) 10 mg, Oral, Daily at bedtime    polyethylene glycol (MIRALAX) 17 g, Daily    rosuvastatin  (CRESTOR) 10 MG tablet No dose, route, or frequency recorded.    traZODone (DESYREL) 100 mg tablet Take by mouth 1/2 (half) to 1 tablet at bedtime as needed for poor sleep.    Trulicity 0.75 MG/0.5ML SOPN inject 0.5 milliliters ( 0.75 milligrams ) subcutaneously every week        Substance Abuse History:    Tobacco, Alcohol and Drug Use History     Tobacco Use    Smoking status: Never    Smokeless tobacco: Never   Vaping Use    Vaping status: Never Used   Substance Use Topics    Alcohol use: Yes     Comment: rare    Drug use: No          Social History:    Social History     Socioeconomic History    Marital status: /Civil Union     Spouse name: Not on file    Number of children: Not on file    Years of education: Not on file    Highest education level: Not on file   Occupational History    Not on file   Other Topics Concern    Not on file   Social History Narrative    Not on file        Family Psychiatric History:     Family History[4]    Medical History Reviewed by provider this encounter:  Tobacco  Allergies  Meds  Problems  Med Hx  Surg Hx  Fam Hx          Objective   There were no vitals taken for this visit.     Mental Status Evaluation:    Appearance age appropriate, casually dressed, dressed appropriately   Behavior cooperative, calm   Speech normal rate, normal volume, normal pitch, spontaneous   Mood euthymic   Affect normal range and intensity, appropriate   Thought Processes organized, goal directed, linear   Thought Content no overt delusions   Perceptual Disturbances: no auditory hallucinations, no visual hallucinations   Abnormal Thoughts  Risk Potential Suicidal ideation - None  Homicidal ideation - None  Potential for aggression - No   Orientation oriented to person, place, time/date, and situation   Memory recent and remote memory grossly intact   Consciousness alert and awake   Attention Span Concentration Span attention span and concentration are age appropriate   Intellect appears  to be of average intelligence   Insight intact and good   Judgement intact and good   Muscle Strength and  Gait normal muscle strength and normal muscle tone, normal gait and normal balance   Motor activity no abnormal movements   Language no difficulty naming common objects, no difficulty repeating a phrase, no difficulty writing a sentence   Fund of Knowledge adequate knowledge of current events  adequate fund of knowledge regarding past history  adequate fund of knowledge regarding vocabulary        Laboratory Results: I have personally reviewed all pertinent laboratory/tests results    Recent Labs (last 6 months):   Appointment on 04/25/2025   Component Date Value    WBC 04/25/2025 5.08     RBC 04/25/2025 5.26 (H)     Hemoglobin 04/25/2025 15.1     Hematocrit 04/25/2025 45.3     MCV 04/25/2025 86     MCH 04/25/2025 28.7     MCHC 04/25/2025 33.3     RDW 04/25/2025 13.0     Platelets 04/25/2025 136 (L)     MPV 04/25/2025 12.5     Sodium 04/25/2025 142     Potassium 04/25/2025 4.1     Chloride 04/25/2025 109 (H)     CO2 04/25/2025 27     ANION GAP 04/25/2025 6     BUN 04/25/2025 15     Creatinine 04/25/2025 0.86     Glucose, Fasting 04/25/2025 108 (H)     Calcium 04/25/2025 9.8     AST 04/25/2025 16     ALT 04/25/2025 16     Alkaline Phosphatase 04/25/2025 77     Total Protein 04/25/2025 6.7     Albumin 04/25/2025 4.2     Total Bilirubin 04/25/2025 0.88     eGFR 04/25/2025 71     Cholesterol 04/25/2025 116     Triglycerides 04/25/2025 106     HDL, Direct 04/25/2025 39 (L)     LDL Calculated 04/25/2025 56     Non-HDL-Chol (CHOL-HDL) 04/25/2025 77     Hemoglobin A1C 04/25/2025 5.4     EAG 04/25/2025 108     Creatinine, Ur 04/25/2025 184.1     Albumin,U,Random 04/25/2025 14.1     Albumin Creat Ratio 04/25/2025 8        Suicide/Homicide Risk Assessment:    Risk of Harm to Self:  Based on today's assessment, Evelia presents the following risk of harm to self: none    Risk of Harm to Others:  Based on today's assessment,  "Evelia presents the following risk of harm to others: none    The following interventions are recommended: Continue medication management. No other intervention changes indicated at this time.    Psychotherapy Provided:     Individual psychotherapy provided: No    Treatment Plan:    Completed and signed during the session: Yes - with Evelia.    Goals: Progress towards Treatment Plan goals - Yes, progressing, as evidenced by subjective findings in HPI/Subjective Section and in Assessment and Plan Section    Depression Follow-up Plan Completed: Yes    Note Share:    This note was shared with patient.    Administrative Statements   Administrative Statements   I have spent a total time of 30 minutes in caring for this patient on the day of the visit/encounter including Counseling / Coordination of care.    Visit Time  Visit Start Time: 11;00AM  Visit Stop Time: 11:30AM  Total Visit Duration: 30 minutes    Portions of the record may have been created with voice recognition software. Occasional wrong word or \"sound a like\" substitutions may have occurred due to the inherent limitations of voice recognition software. Read the chart carefully and recognize, using context, where substitutions have occurred.    RUSSELL Martinez 06/25/25       [1]   Past Medical History:  Diagnosis Date    Anemia     Anxiety     Arthritis     Back pain     Lower back    Bipolar 1 disorder (HCC) 4/29/2024    Depression     Diabetes (HCC)     Diabetes mellitus (HCC) 4/29/2024    Diverticulosis     Hip dysplasia     Bilateral    Hyperlipidemia     Knee pain, right     Nasal septal perforation     following a septoplasty    Obesity 4/29/2024    Psoriasis     Along hairline.    Stress incontinence    [2]   Past Surgical History:  Procedure Laterality Date    COLONOSCOPY      DILATION AND CURETTAGE OF UTERUS      One D&C and one D&E    ESOPHAGOGASTRODUODENOSCOPY      NASAL SEPTUM SURGERY      NH ARTHRS KNE SURG W/MENISCECTOMY MED/LAT W/SHVG Right " 09/27/2018    Procedure: ARTHROSCOPY KNEE; PARTIAL MEDIAL MENISECTOMY; CHONDROPLASTY;  Surgeon: Lien Contreras DO;  Location: [facility];  Service: Orthopedics    TONSILECTOMY AND ADNOIDECTOMY     [3]   Allergies  Allergen Reactions    Banana - Food Allergy Hives     Itching and swelling     Crab (Diagnostic) - Food Allergy Hives    Cleocin [Clindamycin]      Causes C Diff    Latex      Pt told not to be in contact with latex because of banana allergy    Penicillins Other (See Comments)     Causes vaginal itching and must take Diflucan with it   [4]   Family History  Problem Relation Name Age of Onset    Asthma Mother      No Known Problems Father      Rectal cancer Sister nilson 58    No Known Problems Daughter tuyet     No Known Problems Maternal Grandmother      No Known Problems Maternal Grandfather      No Known Problems Paternal Grandmother      No Known Problems Paternal Grandfather      No Known Problems Paternal Aunt lindsey

## 2025-07-15 ENCOUNTER — HOSPITAL ENCOUNTER (OUTPATIENT)
Dept: MAMMOGRAPHY | Facility: HOSPITAL | Age: 65
Discharge: HOME | End: 2025-07-15
Attending: FAMILY MEDICINE
Payer: COMMERCIAL

## 2025-07-15 DIAGNOSIS — Z12.31 ENCOUNTER FOR SCREENING MAMMOGRAM FOR MALIGNANT NEOPLASM OF BREAST: ICD-10-CM

## 2025-07-15 PROCEDURE — 77063 BREAST TOMOSYNTHESIS BI: CPT

## 2025-07-15 PROCEDURE — 77067 SCR MAMMO BI INCL CAD: CPT

## (undated) DEVICE — BLADE SHAVER DISSECTOR 4MM 13CM COOLCUT

## (undated) DEVICE — OCCLUSIVE GAUZE STRIP,3% BISMUTH TRIBROMOPHENATE IN PETROLATUM BLEND: Brand: XEROFORM

## (undated) DEVICE — 3M™ STERI-STRIP™ REINFORCED ADHESIVE SKIN CLOSURES, R1547, 1/2 IN X 4 IN (12 MM X 100 MM), 6 STRIPS/ENVELOPE: Brand: 3M™ STERI-STRIP™

## (undated) DEVICE — BLADE SHAVER DISSECTOR 3.5MM 13CM COOLCUT

## (undated) DEVICE — BETHLEHEM UNIVERSAL  ARTHRO PK: Brand: CARDINAL HEALTH

## (undated) DEVICE — TUBING SUCTION 5MM X 12 FT

## (undated) DEVICE — INTENDED FOR TISSUE SEPARATION, AND OTHER PROCEDURES THAT REQUIRE A SHARP SURGICAL BLADE TO PUNCTURE OR CUT.: Brand: BARD-PARKER ® CARBON RIB-BACK BLADES

## (undated) DEVICE — ABDOMINAL PAD: Brand: DERMACEA

## (undated) DEVICE — GLOVE INDICATOR PI UNDERGLOVE SZ 7 BLUE

## (undated) DEVICE — SCD SEQUENTIAL COMPRESSION COMFORT SLEEVE MEDIUM KNEE LENGTH: Brand: KENDALL SCD

## (undated) DEVICE — PADDING CAST 6IN COTTON STRL

## (undated) DEVICE — SUT MONOCRYL 3-0 PS-2 27 IN Y427H

## (undated) DEVICE — IMPERVIOUS STOCKINETTE: Brand: DEROYAL

## (undated) DEVICE — CUFF TOURNIQUET 30 X 4 IN QUICK CONNECT DISP 1BLA

## (undated) DEVICE — NEEDLE HYPO 22G X 1-1/2 IN

## (undated) DEVICE — WEBRIL 6 IN UNSTERILE

## (undated) DEVICE — CHLORAPREP HI-LITE 26ML ORANGE

## (undated) DEVICE — 3M™ STERI-DRAPE™ U-DRAPE 1015: Brand: STERI-DRAPE™

## (undated) DEVICE — ACE WRAP 6 IN UNSTERILE

## (undated) DEVICE — GAUZE SPONGES,USP TYPE VII GAUZE, 12 PLY: Brand: CURITY

## (undated) DEVICE — COBAN 6 IN STERILE

## (undated) DEVICE — TUBING ARTHROSCOPIC WAVE  MAIN PUMP